# Patient Record
Sex: MALE | Race: WHITE | NOT HISPANIC OR LATINO | Employment: OTHER | URBAN - METROPOLITAN AREA
[De-identification: names, ages, dates, MRNs, and addresses within clinical notes are randomized per-mention and may not be internally consistent; named-entity substitution may affect disease eponyms.]

---

## 2018-06-03 ENCOUNTER — APPOINTMENT (OUTPATIENT)
Dept: RADIOLOGY | Facility: MEDICAL CENTER | Age: 63
End: 2018-06-03
Attending: EMERGENCY MEDICINE
Payer: COMMERCIAL

## 2018-06-03 ENCOUNTER — HOSPITAL ENCOUNTER (EMERGENCY)
Facility: MEDICAL CENTER | Age: 63
End: 2018-06-03
Attending: EMERGENCY MEDICINE
Payer: COMMERCIAL

## 2018-06-03 VITALS
TEMPERATURE: 97.5 F | HEIGHT: 66 IN | HEART RATE: 73 BPM | OXYGEN SATURATION: 93 % | RESPIRATION RATE: 17 BRPM | WEIGHT: 154 LBS | SYSTOLIC BLOOD PRESSURE: 122 MMHG | DIASTOLIC BLOOD PRESSURE: 76 MMHG | BODY MASS INDEX: 24.75 KG/M2

## 2018-06-03 DIAGNOSIS — M25.561 ACUTE PAIN OF RIGHT KNEE: ICD-10-CM

## 2018-06-03 PROCEDURE — A9270 NON-COVERED ITEM OR SERVICE: HCPCS | Performed by: EMERGENCY MEDICINE

## 2018-06-03 PROCEDURE — 99284 EMERGENCY DEPT VISIT MOD MDM: CPT

## 2018-06-03 PROCEDURE — 700102 HCHG RX REV CODE 250 W/ 637 OVERRIDE(OP): Performed by: EMERGENCY MEDICINE

## 2018-06-03 PROCEDURE — 73564 X-RAY EXAM KNEE 4 OR MORE: CPT | Mod: RT

## 2018-06-03 RX ORDER — IBUPROFEN 600 MG/1
600 TABLET ORAL ONCE
Status: COMPLETED | OUTPATIENT
Start: 2018-06-03 | End: 2018-06-03

## 2018-06-03 RX ADMIN — IBUPROFEN 600 MG: 600 TABLET, FILM COATED ORAL at 12:12

## 2018-06-03 ASSESSMENT — LIFESTYLE VARIABLES: DO YOU DRINK ALCOHOL: NO

## 2018-06-03 ASSESSMENT — PAIN SCALES - GENERAL: PAINLEVEL_OUTOF10: 4

## 2018-06-03 NOTE — ED NOTES
PT A&O X4, VS STABLE, RESPIRATIONS SYMMETRICA WITH NO S/S OF DISTRESS. PT VERBALIZES UNDERSTANDING OF DISCHARGE INSTRUCTIONS AND DENIES QUESTIONS. PT DISCHARGED TO HOME WITH CRUTCHES AND RIDE.

## 2018-06-03 NOTE — DISCHARGE INSTRUCTIONS
Joint Pain  Please see an orthopedist in follow-up in [place]. You likely have a meniscal tear  Joint pain, which is also called arthralgia, can be caused by many things. Joint pain often goes away when you follow your health care provider's instructions for relieving pain at home. However, joint pain can also be caused by conditions that require further treatment. Common causes of joint pain include:  · Bruising in the area of the joint.  · Overuse of the joint.  · Wear and tear on the joints that occur with aging (osteoarthritis).  · Various other forms of arthritis.  · A buildup of a crystal form of uric acid in the joint (gout).  · Infections of the joint (septic arthritis) or of the bone (osteomyelitis).  Your health care provider may recommend medicine to help with the pain. If your joint pain continues, additional tests may be needed to diagnose your condition.  Follow these instructions at home:  Watch your condition for any changes. Follow these instructions as directed to lessen the pain that you are feeling.  · Take medicines only as directed by your health care provider.  · Rest the affected area for as long as your health care provider says that you should. If directed to do so, raise the painful joint above the level of your heart while you are sitting or lying down.  · Do not do things that cause or worsen pain.  · If directed, apply ice to the painful area:  ¨ Put ice in a plastic bag.  ¨ Place a towel between your skin and the bag.  ¨ Leave the ice on for 20 minutes, 2-3 times per day.  · Wear an elastic bandage, splint, or sling as directed by your health care provider. Loosen the elastic bandage or splint if your fingers or toes become numb and tingle, or if they turn cold and blue.  · Begin exercising or stretching the affected area as directed by your health care provider. Ask your health care provider what types of exercise are safe for you.  · Keep all follow-up visits as directed by your  health care provider. This is important.  Contact a health care provider if:  · Your pain increases, and medicine does not help.  · Your joint pain does not improve within 3 days.  · You have increased bruising or swelling.  · You have a fever.  · You lose 10 lb (4.5 kg) or more without trying.  Get help right away if:  · You are not able to move the joint.  · Your fingers or toes become numb or they turn cold and blue.  This information is not intended to replace advice given to you by your health care provider. Make sure you discuss any questions you have with your health care provider.  Document Released: 12/18/2006 Document Revised: 05/19/2017 Document Reviewed: 09/29/2015  [employer] Interactive Patient Education © 2017 [employer]

## 2018-06-03 NOTE — ED PROVIDER NOTES
"ED Provider Note    CHIEF COMPLAINT  Chief Complaint   Patient presents with   • Knee Pain     right knee pain       HPI  Geoff Scott is a 63 y.o. male who presents stating that he had right knee pain developed acutely at 10:30 AM when he was walking in twisted his right knee while he had his prosthetic on. He has pain in the medial aspect of the knee joint as well as lateral aspect but denies any patellar discomfort. Denies any swelling or fever or warmth to the joint    ROS  Pertinent negative for fever    PAST MEDICAL HISTORY  Past Medical History:   Diagnosis Date   • Hx of right BKA (HCC)     since age of 10   • Stroke (HCC)     CVA in 2005, no remaining deficits       FAMILY HISTORY  No family history on file.    SOCIAL HISTORY   reports that he has been smoking.  He has never used smokeless tobacco. He reports that he uses drugs, including Inhaled. He reports that he does not drink alcohol.    SURGICAL HISTORY  No past surgical history on file.    CURRENT MEDICATIONS  Home Medications    **Home medications have not yet been reviewed for this encounter**         ALLERGIES  Not on File    PHYSICAL EXAM  VITAL SIGNS: /81   Pulse 80   Temp 36.8 °C (98.2 °F)   Resp 16   Ht 1.676 m (5' 6\")   Wt 69.9 kg (154 lb)   SpO2 98%   BMI 24.86 kg/m²    Constitutional: Well developed, Well nourished, No acute distress, Non-toxic appearance.   Skin: No evidence of erythema or cellulitis  Back:     Extremities: Right knee BKA, full range of motion of the joint. Tenderness at the medial knee joint, right side  Musculoskeletal:   Neurologic: Intact sensory except for medial aspect of the distal knee  Psychiatric:     RADIOLOGY/PROCEDURES  DX-KNEE COMPLETE 4+ RIGHT   Final Result      1.  No acute fracture is identified.      2.  Status post below-the-knee amputation.            COURSE & MEDICAL DECISION MAKING  Pertinent Labs & Imaging studies reviewed. (See chart for details)  Clinically the patient has no evidence " of fracture . He was really concerned about his bony structure and given his mechanism I did perform a 3 view right knee x-ray that shows no evidence of fracture and the joint line appears to be intact. The distal bone appears to be expected as a result of his amputation without evidence of significant erosive changes     The patient is discharged with instructions on right knee pain and will see an orthopedist in follow-up. He understands this is likely a meniscal tear and not a medial collateral strain but this is also a possibility. He can weight-bear as tolerated    FINAL IMPRESSION  1. Acute pain of right knee            Electronically signed by: Phoenix Pruitt, 6/3/2018 1:55 PM

## 2022-01-27 ENCOUNTER — APPOINTMENT (OUTPATIENT)
Dept: RADIOLOGY | Facility: MEDICAL CENTER | Age: 67
DRG: 023 | End: 2022-01-27
Attending: EMERGENCY MEDICINE
Payer: MEDICARE

## 2022-01-27 ENCOUNTER — APPOINTMENT (OUTPATIENT)
Dept: RADIOLOGY | Facility: MEDICAL CENTER | Age: 67
DRG: 023 | End: 2022-01-27
Attending: RADIOLOGY
Payer: MEDICARE

## 2022-01-27 ENCOUNTER — APPOINTMENT (OUTPATIENT)
Dept: RADIOLOGY | Facility: MEDICAL CENTER | Age: 67
DRG: 023 | End: 2022-01-27
Attending: INTERNAL MEDICINE
Payer: MEDICARE

## 2022-01-27 ENCOUNTER — HOSPITAL ENCOUNTER (INPATIENT)
Facility: MEDICAL CENTER | Age: 67
LOS: 14 days | DRG: 023 | End: 2022-02-10
Attending: EMERGENCY MEDICINE | Admitting: HOSPITALIST
Payer: MEDICARE

## 2022-01-27 DIAGNOSIS — I63.9 ACUTE CVA (CEREBROVASCULAR ACCIDENT) (HCC): ICD-10-CM

## 2022-01-27 DIAGNOSIS — I63.9 ACUTE ISCHEMIC STROKE (HCC): ICD-10-CM

## 2022-01-27 PROBLEM — U07.1 COVID-19: Status: ACTIVE | Noted: 2022-01-27

## 2022-01-27 LAB
ABO GROUP BLD: ABNORMAL
ALBUMIN SERPL BCP-MCNC: 3.3 G/DL (ref 3.2–4.9)
ALBUMIN/GLOB SERPL: 0.8 G/DL
ALP SERPL-CCNC: 99 U/L (ref 30–99)
ALT SERPL-CCNC: 45 U/L (ref 2–50)
ANION GAP SERPL CALC-SCNC: 13 MMOL/L (ref 7–16)
APTT PPP: 42.1 SEC (ref 24.7–36)
AST SERPL-CCNC: 29 U/L (ref 12–45)
BARCODED ABORH UBTYP: 5100
BARCODED PRD CODE UBPRD: ABNORMAL
BARCODED UNIT NUM UBUNT: ABNORMAL
BASOPHILS # BLD AUTO: 0.1 % (ref 0–1.8)
BASOPHILS # BLD: 0.01 K/UL (ref 0–0.12)
BILIRUB SERPL-MCNC: 0.7 MG/DL (ref 0.1–1.5)
BLD GP AB SCN SERPL QL: ABNORMAL
BUN SERPL-MCNC: 11 MG/DL (ref 8–22)
CALCIUM SERPL-MCNC: 8.8 MG/DL (ref 8.5–10.5)
CHLORIDE SERPL-SCNC: 102 MMOL/L (ref 96–112)
CO2 SERPL-SCNC: 23 MMOL/L (ref 20–33)
COMPONENT R 8504R: ABNORMAL
CREAT SERPL-MCNC: 0.78 MG/DL (ref 0.5–1.4)
EKG IMPRESSION: NORMAL
EOSINOPHIL # BLD AUTO: 0.02 K/UL (ref 0–0.51)
EOSINOPHIL NFR BLD: 0.3 % (ref 0–6.9)
ERYTHROCYTE [DISTWIDTH] IN BLOOD BY AUTOMATED COUNT: 45.6 FL (ref 35.9–50)
FLUAV RNA SPEC QL NAA+PROBE: NEGATIVE
FLUBV RNA SPEC QL NAA+PROBE: NEGATIVE
GLOBULIN SER CALC-MCNC: 4.2 G/DL (ref 1.9–3.5)
GLUCOSE BLD-MCNC: 104 MG/DL (ref 65–99)
GLUCOSE SERPL-MCNC: 115 MG/DL (ref 65–99)
HCT VFR BLD AUTO: 48.3 % (ref 42–52)
HGB BLD-MCNC: 16.4 G/DL (ref 14–18)
IMM GRANULOCYTES # BLD AUTO: 0.02 K/UL (ref 0–0.11)
IMM GRANULOCYTES NFR BLD AUTO: 0.3 % (ref 0–0.9)
INR PPP: 1.1 (ref 0.87–1.13)
LYMPHOCYTES # BLD AUTO: 1.58 K/UL (ref 1–4.8)
LYMPHOCYTES NFR BLD: 22.2 % (ref 22–41)
MCH RBC QN AUTO: 32 PG (ref 27–33)
MCHC RBC AUTO-ENTMCNC: 34 G/DL (ref 33.7–35.3)
MCV RBC AUTO: 94.3 FL (ref 81.4–97.8)
MONOCYTES # BLD AUTO: 0.69 K/UL (ref 0–0.85)
MONOCYTES NFR BLD AUTO: 9.7 % (ref 0–13.4)
NEUTROPHILS # BLD AUTO: 4.8 K/UL (ref 1.82–7.42)
NEUTROPHILS NFR BLD: 67.4 % (ref 44–72)
NRBC # BLD AUTO: 0 K/UL
NRBC BLD-RTO: 0 /100 WBC
PLATELET # BLD AUTO: 404 K/UL (ref 164–446)
PMV BLD AUTO: 8.9 FL (ref 9–12.9)
POTASSIUM SERPL-SCNC: 3.8 MMOL/L (ref 3.6–5.5)
PRODUCT TYPE UPROD: ABNORMAL
PROT SERPL-MCNC: 7.5 G/DL (ref 6–8.2)
PROTHROMBIN TIME: 13.9 SEC (ref 12–14.6)
RBC # BLD AUTO: 5.12 M/UL (ref 4.7–6.1)
RH BLD: ABNORMAL
RSV RNA SPEC QL NAA+PROBE: NEGATIVE
SARS-COV-2 RNA RESP QL NAA+PROBE: DETECTED
SODIUM SERPL-SCNC: 138 MMOL/L (ref 135–145)
SPECIMEN SOURCE: ABNORMAL
TROPONIN T SERPL-MCNC: 7 NG/L (ref 6–19)
UNIT STATUS USTAT: ABNORMAL
WBC # BLD AUTO: 7.1 K/UL (ref 4.8–10.8)

## 2022-01-27 PROCEDURE — 70496 CT ANGIOGRAPHY HEAD: CPT

## 2022-01-27 PROCEDURE — 700101 HCHG RX REV CODE 250: Performed by: RADIOLOGY

## 2022-01-27 PROCEDURE — 770022 HCHG ROOM/CARE - ICU (200)

## 2022-01-27 PROCEDURE — 700105 HCHG RX REV CODE 258: Performed by: INTERNAL MEDICINE

## 2022-01-27 PROCEDURE — 700117 HCHG RX CONTRAST REV CODE 255: Performed by: RADIOLOGY

## 2022-01-27 PROCEDURE — C1760 CLOSURE DEV, VASC: HCPCS

## 2022-01-27 PROCEDURE — 94760 N-INVAS EAR/PLS OXIMETRY 1: CPT

## 2022-01-27 PROCEDURE — 99291 CRITICAL CARE FIRST HOUR: CPT | Performed by: INTERNAL MEDICINE

## 2022-01-27 PROCEDURE — 70498 CT ANGIOGRAPHY NECK: CPT

## 2022-01-27 PROCEDURE — 99291 CRITICAL CARE FIRST HOUR: CPT | Performed by: PSYCHIATRY & NEUROLOGY

## 2022-01-27 PROCEDURE — 86922 COMPATIBILITY TEST ANTIGLOB: CPT

## 2022-01-27 PROCEDURE — 86900 BLOOD TYPING SEROLOGIC ABO: CPT

## 2022-01-27 PROCEDURE — 99291 CRITICAL CARE FIRST HOUR: CPT

## 2022-01-27 PROCEDURE — 86905 BLOOD TYPING RBC ANTIGENS: CPT

## 2022-01-27 PROCEDURE — 86870 RBC ANTIBODY IDENTIFICATION: CPT

## 2022-01-27 PROCEDURE — 03CG3ZZ EXTIRPATION OF MATTER FROM INTRACRANIAL ARTERY, PERCUTANEOUS APPROACH: ICD-10-PCS | Performed by: RADIOLOGY

## 2022-01-27 PROCEDURE — 85730 THROMBOPLASTIN TIME PARTIAL: CPT

## 2022-01-27 PROCEDURE — 93005 ELECTROCARDIOGRAM TRACING: CPT | Performed by: EMERGENCY MEDICINE

## 2022-01-27 PROCEDURE — 71045 X-RAY EXAM CHEST 1 VIEW: CPT

## 2022-01-27 PROCEDURE — 700105 HCHG RX REV CODE 258: Performed by: PSYCHIATRY & NEUROLOGY

## 2022-01-27 PROCEDURE — 93970 EXTREMITY STUDY: CPT

## 2022-01-27 PROCEDURE — 0042T CT-CEREBRAL PERFUSION ANALYSIS: CPT

## 2022-01-27 PROCEDURE — 70450 CT HEAD/BRAIN W/O DYE: CPT

## 2022-01-27 PROCEDURE — 82962 GLUCOSE BLOOD TEST: CPT

## 2022-01-27 PROCEDURE — 93010 ELECTROCARDIOGRAM REPORT: CPT | Performed by: INTERNAL MEDICINE

## 2022-01-27 PROCEDURE — 86901 BLOOD TYPING SEROLOGIC RH(D): CPT

## 2022-01-27 PROCEDURE — 85025 COMPLETE CBC W/AUTO DIFF WBC: CPT

## 2022-01-27 PROCEDURE — 86850 RBC ANTIBODY SCREEN: CPT

## 2022-01-27 PROCEDURE — 700117 HCHG RX CONTRAST REV CODE 255: Performed by: EMERGENCY MEDICINE

## 2022-01-27 PROCEDURE — 3E03317 INTRODUCTION OF OTHER THROMBOLYTIC INTO PERIPHERAL VEIN, PERCUTANEOUS APPROACH: ICD-10-PCS | Performed by: HOSPITALIST

## 2022-01-27 PROCEDURE — 700111 HCHG RX REV CODE 636 W/ 250 OVERRIDE (IP): Mod: JG | Performed by: PSYCHIATRY & NEUROLOGY

## 2022-01-27 PROCEDURE — 37195 THROMBOLYTIC THERAPY STROKE: CPT

## 2022-01-27 PROCEDURE — B3171ZZ FLUOROSCOPY OF LEFT INTERNAL CAROTID ARTERY USING LOW OSMOLAR CONTRAST: ICD-10-PCS | Performed by: RADIOLOGY

## 2022-01-27 PROCEDURE — 0241U HCHG SARS-COV-2 COVID-19 NFCT DS RESP RNA 4 TRGT MIC: CPT

## 2022-01-27 PROCEDURE — 85610 PROTHROMBIN TIME: CPT

## 2022-01-27 PROCEDURE — 80053 COMPREHEN METABOLIC PANEL: CPT

## 2022-01-27 PROCEDURE — 84484 ASSAY OF TROPONIN QUANT: CPT

## 2022-01-27 RX ORDER — ONDANSETRON 4 MG/1
4 TABLET, ORALLY DISINTEGRATING ORAL EVERY 4 HOURS PRN
Status: DISCONTINUED | OUTPATIENT
Start: 2022-01-27 | End: 2022-01-30

## 2022-01-27 RX ORDER — ACETAMINOPHEN 325 MG/1
650 TABLET ORAL EVERY 4 HOURS PRN
Status: ON HOLD | COMMUNITY
End: 2022-07-06

## 2022-01-27 RX ORDER — SODIUM CHLORIDE 9 MG/ML
INJECTION, SOLUTION INTRAVENOUS CONTINUOUS
Status: DISCONTINUED | OUTPATIENT
Start: 2022-01-27 | End: 2022-01-28

## 2022-01-27 RX ORDER — NOREPINEPHRINE BITARTRATE 0.03 MG/ML
0-30 INJECTION, SOLUTION INTRAVENOUS CONTINUOUS
Status: DISCONTINUED | OUTPATIENT
Start: 2022-01-27 | End: 2022-01-28

## 2022-01-27 RX ORDER — BISACODYL 10 MG
10 SUPPOSITORY, RECTAL RECTAL
Status: DISCONTINUED | OUTPATIENT
Start: 2022-01-27 | End: 2022-01-30

## 2022-01-27 RX ORDER — AMOXICILLIN 250 MG
2 CAPSULE ORAL 2 TIMES DAILY
Status: DISCONTINUED | OUTPATIENT
Start: 2022-01-27 | End: 2022-01-30

## 2022-01-27 RX ORDER — ONDANSETRON 2 MG/ML
4 INJECTION INTRAMUSCULAR; INTRAVENOUS EVERY 4 HOURS PRN
Status: DISCONTINUED | OUTPATIENT
Start: 2022-01-27 | End: 2022-02-10 | Stop reason: HOSPADM

## 2022-01-27 RX ORDER — LABETALOL HYDROCHLORIDE 5 MG/ML
10 INJECTION, SOLUTION INTRAVENOUS EVERY 4 HOURS PRN
Status: DISCONTINUED | OUTPATIENT
Start: 2022-01-27 | End: 2022-01-27

## 2022-01-27 RX ORDER — DEXTROSE MONOHYDRATE 25 G/50ML
50 INJECTION, SOLUTION INTRAVENOUS
Status: DISCONTINUED | OUTPATIENT
Start: 2022-01-27 | End: 2022-01-29

## 2022-01-27 RX ORDER — ACETAMINOPHEN 325 MG/1
650 TABLET ORAL EVERY 6 HOURS PRN
Status: DISCONTINUED | OUTPATIENT
Start: 2022-01-27 | End: 2022-01-30

## 2022-01-27 RX ORDER — HYDRALAZINE HYDROCHLORIDE 20 MG/ML
10 INJECTION INTRAMUSCULAR; INTRAVENOUS
Status: DISCONTINUED | OUTPATIENT
Start: 2022-01-27 | End: 2022-01-28

## 2022-01-27 RX ORDER — SODIUM CHLORIDE 9 MG/ML
50 INJECTION, SOLUTION INTRAVENOUS ONCE
Status: COMPLETED | OUTPATIENT
Start: 2022-01-27 | End: 2022-01-27

## 2022-01-27 RX ORDER — ATORVASTATIN CALCIUM 40 MG/1
80 TABLET, FILM COATED ORAL EVERY EVENING
Status: DISCONTINUED | OUTPATIENT
Start: 2022-01-27 | End: 2022-01-30

## 2022-01-27 RX ORDER — POLYETHYLENE GLYCOL 3350 17 G/17G
1 POWDER, FOR SOLUTION ORAL
Status: DISCONTINUED | OUTPATIENT
Start: 2022-01-27 | End: 2022-01-30

## 2022-01-27 RX ORDER — LABETALOL HYDROCHLORIDE 5 MG/ML
10 INJECTION, SOLUTION INTRAVENOUS
Status: DISCONTINUED | OUTPATIENT
Start: 2022-01-27 | End: 2022-01-28

## 2022-01-27 RX ADMIN — SODIUM CHLORIDE 50 ML: 9 INJECTION, SOLUTION INTRAVENOUS at 16:15

## 2022-01-27 RX ADMIN — SODIUM CHLORIDE: 9 INJECTION, SOLUTION INTRAVENOUS at 17:50

## 2022-01-27 RX ADMIN — IOHEXOL 40 ML: 350 INJECTION, SOLUTION INTRAVENOUS at 15:25

## 2022-01-27 RX ADMIN — IOHEXOL 55 ML: 300 INJECTION, SOLUTION INTRAVENOUS at 17:30

## 2022-01-27 RX ADMIN — LABETALOL HYDROCHLORIDE 10 MG: 5 INJECTION INTRAVENOUS at 20:40

## 2022-01-27 RX ADMIN — IOHEXOL 80 ML: 350 INJECTION, SOLUTION INTRAVENOUS at 15:27

## 2022-01-27 RX ADMIN — ALTEPLASE 64.8 MG: KIT at 15:32

## 2022-01-27 ASSESSMENT — FIBROSIS 4 INDEX: FIB4 SCORE: 0.7

## 2022-01-27 ASSESSMENT — ENCOUNTER SYMPTOMS
VOMITING: 0
MYALGIAS: 1
CHILLS: 0
COUGH: 1
FEVER: 0
SHORTNESS OF BREATH: 0
SPEECH CHANGE: 1
FOCAL WEAKNESS: 1
NAUSEA: 0
ABDOMINAL PAIN: 0

## 2022-01-27 ASSESSMENT — PAIN DESCRIPTION - PAIN TYPE
TYPE: ACUTE PAIN
TYPE: ACUTE PAIN

## 2022-01-27 NOTE — ED TRIAGE NOTES
Geoff Scott  66 y.o.  male  Chief Complaint   Patient presents with   • Possible Stroke     Onset at 1400, R sided gaze deviation & L sided facial droop, L sided arm & leg drift. BG 98 ons scene with REMSA. IV placed en route, no thinners.     Dr. Arechiga to charge desk & pt directly to CT then T-1.

## 2022-01-27 NOTE — ED NOTES
Pt to IT from trauma bay on [facility]. Report given to receiving RN. All belongings sent with pt & alteplase drip running.

## 2022-01-27 NOTE — ED PROVIDER NOTES
ED Provider Note    ED Provider Note    Primary care provider: Pcp Pt States None  Means of arrival: EMS  History obtained from: Patient    CHIEF COMPLAINT  Chief Complaint   Patient presents with   • Possible Stroke     Onset at 1400, R sided gaze deviation & L sided facial droop, L sided arm & leg drift. BG 98 ons scene with REMSA. IV placed en route, no thinners.     Seen at 3:12 PM.   HPI  Geoff Scott is a 66 y.o. male who presents to the Emergency Department as a stroke activation.  The patient was last known well at 2 PM today.  Then he had sudden onset of difficulty speaking, facial droop and left-sided weakness.  The history is obtained via his spouse as the patient cannot give any significant history at this time.    REVIEW OF SYSTEMS  See HPI,   Remainder of ROS unobtainable.     PAST MEDICAL HISTORY   has a past medical history of right BKA (HCC) and Stroke (HCC).    SURGICAL HISTORY  patient denies any surgical history    SOCIAL HISTORY  Social History     Tobacco Use   • Smoking status: Current Some Day Smoker   • Smokeless tobacco: Never Used   Substance Use Topics   • Alcohol use: No   • Drug use: Yes     Types: Inhaled     Comment: cannabis      Social History     Substance and Sexual Activity   Drug Use Yes   • Types: Inhaled    Comment: cannabis       FAMILY HISTORY  No family history on file.    CURRENT MEDICATIONS  Reviewed.  See Encounter Summary.     ALLERGIES  Not on File    PHYSICAL EXAM  VITAL SIGNS: /89   Pulse 80   Temp 36.7 °C (98 °F) (Temporal)   Resp (!) 23   Wt 80 kg (176 lb 5.9 oz)   SpO2 99%   BMI 28.47 kg/m²   Constitutional: Awake, appears mildly sedated, no acute distress   HENT: Normocephalic, atraumatic bilateral external ears normal. Nose normal.   Eyes: Right-sided gaze deviation with nystagmus, pupils are equal and reactive.  There appears to be some left-sided neglect.  Thorax & Lungs: Easy unlabored respirations, Clear to ascultation  bilaterally.  Cardiovascular: Regular rate, Regular rhythm, No murmurs, rubs or gallops. Bilateral pulses symmetrical.   Abdomen:  Soft, nontender, nondistended, normal active bowel sounds.   :    Skin: Visualized skin is  Dry, No erythema, No rash.   Musculoskeletal:   No cyanosis, clubbing or edema. No leg asymmetry.   Neurologic: Alert, no obvious facial droop, the patient has right-sided gaze, drift noted of the left upper and lower extremities, patient intermittently follows commands on the left, he does say a few words that are intelligible such as water but cannot communicate at this time.  Right upper and lower extremity have normal strength without drift.  NIH stroke scale approximately 18.  Psychiatric: Normal affect, Normal mood  Lymphatic:  No cervical LAD        RADIOLOGY  CT-CTA NECK WITH & W/O-POST PROCESSING   Final Result   Addendum 1 of 1   Addendum:   There is a small nonocclusive arterial thrombus within the right    innominate artery. This is seen on series 9 image 116.      These findings were discussed with REMY CARRERO by Dr. Rock on    1/27/2022.      Final      1.  No hemodynamically significant stenosis of the neck arteries.   2.  Bilateral ill-defined pulmonary opacities in keeping with Covid pneumonia.      CT-CTA HEAD WITH & W/O-POST PROCESS   Final Result      1.  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            Comment: Results discussed with Dr. Carrero at approximately 3:35 PM      CT-CEREBRAL PERFUSION ANALYSIS   Final Result      1.  Nondiagnostic scan secondary to patient motion.      CT-HEAD W/O   Final Result         NO ACUTE ABNORMALITIES ARE NOTED ON CT SCAN OF THE HEAD.               DX-CHEST-PORTABLE (1 VIEW)    (Results Pending)   IR-THROMBO MECHANICAL ARTERY,INIT    (Results Pending)   EC-ECHOCARDIOGRAM COMPLETE W/O CONT    (Results Pending)         COURSE & MEDICAL DECISION MAKING  Pertinent Labs & Imaging studies reviewed. (See  chart for details)    Differential diagnoses include but are not limited to: Acute CVA.    3:12 PM - Medical record reviewed, patient seen and examined at bedside.    3:51 PM: Case discussed with Dr. Salinas, radiology.  The patient does have an acute M2 and M3 occlusion.  I updated neurology, Dr. Arechiga.  Dr. Sanchez aware and the patient will be taken to the Cath Lab for possible thrombectomy.   I then called the daughter to update her with the test results, she is living in [facility].  The spouse is in the ER waiting room, I then had a discussion with her as well as to what the current plan is going to be.    4:15 PM Case discussed with Dr. Block, intensivist who will evaluate the patient for ICU admission.  Decision Making:  This is an unfortunate 66-year-old male who presents with a devastating CVA.  He did show up within the window for tenecteplase.  NIH stroke scale is approximately 16-18.  He did receive tenecteplase in the ER after noncontrast CT did not show an acute bleed.  CT with contrast does show an acute occlusion of M2 and M3 of the right middle cerebral artery.  Interventional radiology is aware and the patient went directly from the ER to the Cath Lab for thrombectomy.  Case discussed with the intensivist as well.    Incidentally, the patient did test positive for COVID-19.  This is a known pro-thrombotic state, so possibly related to his presentation today.  The patient will be admitted to the ICU in guarded condition.    CRITICAL CARE  The very real possibilty of a deterioration of this patient's condition required the highest level of my preparedness for sudden, emergent intervention.  I provided critical care services, which included medication orders, frequent reevaluations of the patient's condition and response to treatment, ordering and reviewing test results, and discussing the case with various consultants.  The critical care time associated with the care of the patient was  thirty-five minutes. Review chart for interventions. This time is exclusive of any other billable procedures.         FINAL IMPRESSION  1. Acute ischemic stroke (HCC)    2. Acute CVA (cerebrovascular accident) (HCC)    3.  Covid-19

## 2022-01-27 NOTE — PROGRESS NOTES
IR NOTE:     1542 - pt presents to IR for Cerebral Angiogram with possible mechanical thrombectomy w/moderate sedation and performed by Dr. Sanchez.  Pt presents to IR with ER staff, SEBAS Golden, per ER staff pt LKW 1400, wife with pt,  presented with right sided gaze, left facial droop and lue/lle weakness.  Per ED staff initial NIH 16 and repeat after tpa administration NIH 14.  Pt to IR with piv RFA with tpa infusing. Pt has + slurred speech. Pt alert and oriented, difficulty speaking.  Pt needs constant redirection to hold hands down.  Pt + movement of LUE.  Pt +BKA RLE.  Pt denies DM.      1605 Timeout, all team members agree.  Procedure begins.     1609 Dr. Sanchez gained access right femoral artery 8 F sheath     1614  First angio performed by Dr. Sanchez    1619 tpa completed, NS flush infusing.  First pass completed by Dr. Sanchez    1623 Tici 2a after first pass per Dr. Sanchez    1631 Dr. Sanchez performing 2nd pass.     1635 Tici 2b obtained Right M2    1640 Dr. Sanabria performing 3rd pass    1644 Tici 2b remains    1648 Angio Seal placed right groin by Dr. Sanchez  6F   Ref: [id number]  LOT: [id number]  EXP: 9/30/22    1650 procedure end, pressure held right groin by Russell ESTRADA x 5 min    1656 Gauze and tegaderm applied right groin.     1700 Pt able to move bilat upper and lower extremities, right sided gaze, able to move past midline to look at left side.  GCS 15, pt speech clearer and pt laughing.

## 2022-01-27 NOTE — ED NOTES
R AC IV placed by REMSA interstitial after CT contrast given. Heat pack applied & IV access obtained in R hand for TPA administration.

## 2022-01-27 NOTE — CONSULTS
Neurology STROKE CODE H&P  Neurohospitalist Service, [facility]    Referring Physician: Morgan Beyer MD    STROKE CODE: L side weakness, slurred speech    To obtain the most accurate data regarding the time called, and time patient seen, refer to the stroke run-sheet and chart.  For time of CT, refer to the radiology report. See A&P below for TPA Decision and door to needle time if and when applicable.    HPI: Geoff Scott is a 66 year old man with no known medical history presenting with L side weakness and slurred speech.  Per EMS, he is not on home medications.  At 1400 today, family witnessed him to acutely developed slurred speech, L side weakness.  He apparently stumbled and fell, but did not strike his head.  EMS was activated and noted similar symptoms. Initial SBP in 130s, HR 78, FSBS 98.  He was transferred by life flight to [facility].  On arrival, NIHSS was 16 as documented below, consistent with R MCA syndrome.  Stroke protocol CT did not revealed a large territory stroke or hemorrhage.  CT angiogram with R M2 occlusion.  CT perfusion was non-diagnostics.  He received IV-tPA and was taken to cath lab for endovascular clot retrieval.  Unable to attain ROS, per EMS report does smoke marijunana.    Review of systems: In addition to what is detailed in the HPI above, all other systems reviewed and are negative.    Past Medical History:    has a past medical history of right BKA (HCC) and Stroke ([facility]).    FHx:  Unable to attain due to his stroke symptoms    SHx:   reports that he has been smoking. He has never used smokeless tobacco. He reports current drug use. Drug: Inhaled. He reports that he does not drink alcohol.    Allergies:  No known drug allergy    Medications:    Current Facility-Administered Medications:   •  alteplase (ACTIVASE) BOLUS injection 7.2 mg, 0.09 mg/kg, Intravenous, Once **FOLLOWED BY** alteplase (ACTIVASE) injection 64.8 mg, 0.81 mg/kg, Intravenous, Once  **FOLLOWED BY** NS infusion 50 mL, 50 mL, Intravenous, Once, Alec Arechiga M.D.  No current outpatient medications on file.    Physical Examination:    Vitals:    01/27/22 1500   Weight: 80 kg (176 lb 5.9 oz)         General: Patient is awake and in no acute distress  Eye: Examination of optic disks not indicated at this time given acuity of consult  Neck: There is normal range of motion  CV: Regular rate   Extremities:  Clear, dry, intact, without peripheral edema    NEUROLOGICAL EXAM:     Mental status: Awake, alert, oriented  Speech and language: Speech paucity.  Severe dysarthria.  He follows all commands briskly  Cranial nerve exam: L face droop, R gaze deviation.  Does not blink to threat from L.    Motor exam: There is sustained antigravity with no downward drift in R arm and leg.  L arm and leg are antigravity with drift.  Sensory exam:  Reacts to tactile in all 4 distal extremities, does appear to neglect L side  Coordination: No ataxia on spontaneous movements  Gait: Deferred due to patient preference.    NIHSS: National Institutes of Health Stroke Scale    [0] 1a:Level of Consciousness    0-alert 1-drowsy   2-stupor   3-coma  [2] 1b:LOC Questions                  0-both  1-one      2-neither  [0] 1c:LOC Commands                   0-both  1-one      2-neither  [2] 2: Best Gaze                     0-nl    1-partial  2-forced  [2] 3: Visual Fields                   0-nl    1-partial  2-complete 3-bilat  [2] 4: Facial Paresis                0-nl    1-minor    2-partial  3-full  MOTOR                       0-nl  [0] 5: Right Arm           1-drift  [1] 6: Left Arm             2-some effort vs gravity  [0] 7: Right Leg           3-no effort vs gravity  [1] 8: Left Leg             4-no movement                             x-untestable  [0] 9: Limb Ataxia                    0-abs   1-1_limb   2-2+_limbs       x-untestable  [1] 10:Sensory                        0-nl    1-partial  2-dense  [2] 11:Best  Language/Aphasia         0-nl    1-mild/mod 2-severe   3-mute  [1] 12:Dysarthria                     0-nl    1-mild/mod 2-severe       x-untestable  [2] 13:Neglect/Inattention            0-none  1-partial  2-complete  [16] TOTAL    Baseline Modified Kati Scale (MRS): 1 = No significant disability, despite symptoms; able to perform all usual duties and activities    Objective Data:    Labs:  No results found for: PROTHROMBTM, INR   No results found for: WBC, RBC, HEMOGLOBIN, HEMATOCRIT, MCV, MCH, MCHC, MPV, NEUTSPOLYS, LYMPHOCYTES, MONOCYTES, EOSINOPHILS, BASOPHILS, HYPOCHROMIA, ANISOCYTOSIS   No results found for: SODIUM, POTASSIUM, CHLORIDE, CO2, GLUCOSE, BUN, CREATININE, BUNCREATRAT, GLOMRATE   No results found for: CHOLSTRLTOT, LDL, HDL, TRIGLYCERIDE    No results found for: ALKPHOSPHAT, ASTSGOT, ALTSGPT, TBILIRUBIN     Imaging/Testing:    I interpreted and/or reviewed the patient's neuroimaging    CT-CTA NECK WITH & W/O-POST PROCESSING   Final Result   Addendum 1 of 1   Addendum:   There is a small nonocclusive arterial thrombus within the right    innominate artery. This is seen on series 9 image 116.      These findings were discussed with REMY CARRERO by Dr. Rock on    1/27/2022.      Final      1.  No hemodynamically significant stenosis of the neck arteries.   2.  Bilateral ill-defined pulmonary opacities in keeping with Covid pneumonia.      CT-CTA HEAD WITH & W/O-POST PROCESS   Final Result      1.  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            Comment: Results discussed with Dr. Carrero at approximately 3:35 PM      CT-CEREBRAL PERFUSION ANALYSIS   Final Result      1.  Nondiagnostic scan secondary to patient motion.      CT-HEAD W/O   Final Result         NO ACUTE ABNORMALITIES ARE NOTED ON CT SCAN OF THE HEAD.               DX-CHEST-PORTABLE (1 VIEW)    (Results Pending)   IR-THROMBO MECHANICAL ARTERY,INIT    (Results Pending)       Assessment and  Plan:  Geoff Scott is a 66 year old man presenting with R MCA syndrome, found to have a R M2 occlusion.  He has received IV-tPA and will be taken to cath lab for endovascular clot retrieval.  Unclear stroke etiology- he does have findings suspicious for COVID pneumonia- and stroke may be secondary to COVID associated hypercoagulability.  Will monitor post-operatively in ICU, assess vascular risk factors and initiate secondary prevention regimen as noted below.    I recommend administering IV tPA per standard protocol.      I reviewed the risks (including possible bleeding complications and death), benefits, and alternatives with the patient and/or surrogate decision maker who wishes to proceed with the medication.    Last known well time: 1400  IV-tPA bolus dose: 7.2mg  IV-tPA bolus time: 1530    Problem list:  1.  R MCA stroke s/p IV-tPA and mechanical thrombectomy  2.  Suspect COVID pneumonia    Recommendations:   - admit to ICU post-operatively   - neurochecks/NIHSS per post-tPA protocol   - BP goal post-operatively TBD based on TICI score as follows:    If TICI 3: maintain systolic -140    If TICI 2b: maintain systolic 120-160    If TICI 2a or less, maintain systolic -180    - MRI brain without contrast in AM, does not need to wait 24 hours   - no need for repeat head CT unless there is clinical deterioration   - no antithrombotic therapy x 24 hours, SCDs only   - check COVID   - anticipate starting anticoagulation with apixaban for presumed COVID-related hypercoagulability, TBD pending MRI results   - TTE, long-term cardiac monitor for occult atrial fibrillation- if unmasked will need lifelong anticoagulation   - stroke labs:  HgbA1c and lipid panel   - start atorvastatin 80mg daily for goal LDL < 70 when able   - PT/OT/SLP when able, ok if within first 24 hours    Addendum:  TICI 2b, SBP goal 120-160 STRICT.  Plan as above.    Upon my evaluation, this patient demonstrated a high probability  imminent or life threatening deterioration due to cerebrovascular accident which required my direct attention, intervention and management.  I personally provided 65 minutes of total critical care time which included assessment of the patient, determining eligibility for TPA or mechanical thrombectomy, review of imaging studies and discussion with Radiology, discussion with E.R. Physician, ICU staff, patient as well as family members and monitoring for potential decompensation. Interventions were performed as documented above.       Alec Arechiga MD  Neurohospitalist, [facility]

## 2022-01-28 ENCOUNTER — APPOINTMENT (OUTPATIENT)
Dept: RADIOLOGY | Facility: MEDICAL CENTER | Age: 67
DRG: 023 | End: 2022-01-28
Attending: INTERNAL MEDICINE
Payer: MEDICARE

## 2022-01-28 ENCOUNTER — APPOINTMENT (OUTPATIENT)
Dept: RADIOLOGY | Facility: MEDICAL CENTER | Age: 67
DRG: 023 | End: 2022-01-28
Attending: NURSE PRACTITIONER
Payer: MEDICARE

## 2022-01-28 ENCOUNTER — APPOINTMENT (OUTPATIENT)
Dept: RADIOLOGY | Facility: MEDICAL CENTER | Age: 67
DRG: 023 | End: 2022-01-28
Attending: RADIOLOGY
Payer: MEDICARE

## 2022-01-28 PROBLEM — E11.49 TYPE 2 DIABETES MELLITUS WITH NEUROLOGIC COMPLICATION, WITHOUT LONG-TERM CURRENT USE OF INSULIN (HCC): Status: ACTIVE | Noted: 2022-01-28

## 2022-01-28 LAB
ABO + RH BLD: NORMAL
ALBUMIN SERPL BCP-MCNC: 3.3 G/DL (ref 3.2–4.9)
ALBUMIN/GLOB SERPL: 1 G/DL
ALP SERPL-CCNC: 89 U/L (ref 30–99)
ALT SERPL-CCNC: 31 U/L (ref 2–50)
ANION GAP SERPL CALC-SCNC: 11 MMOL/L (ref 7–16)
AST SERPL-CCNC: 23 U/L (ref 12–45)
BILIRUB SERPL-MCNC: 0.7 MG/DL (ref 0.1–1.5)
BLD GP AB INVEST PLASRBC-IMP: ABNORMAL
BUN SERPL-MCNC: 11 MG/DL (ref 8–22)
CALCIUM SERPL-MCNC: 8.3 MG/DL (ref 8.5–10.5)
CHLORIDE SERPL-SCNC: 106 MMOL/L (ref 96–112)
CHOLEST SERPL-MCNC: 183 MG/DL (ref 100–199)
CO2 SERPL-SCNC: 22 MMOL/L (ref 20–33)
CREAT SERPL-MCNC: 0.74 MG/DL (ref 0.5–1.4)
ERYTHROCYTE [DISTWIDTH] IN BLOOD BY AUTOMATED COUNT: 46.1 FL (ref 35.9–50)
EST. AVERAGE GLUCOSE BLD GHB EST-MCNC: 143 MG/DL
GLOBULIN SER CALC-MCNC: 3.2 G/DL (ref 1.9–3.5)
GLUCOSE BLD-MCNC: 102 MG/DL (ref 65–99)
GLUCOSE BLD-MCNC: 103 MG/DL (ref 65–99)
GLUCOSE BLD-MCNC: 114 MG/DL (ref 65–99)
GLUCOSE BLD-MCNC: 84 MG/DL (ref 65–99)
GLUCOSE BLD-MCNC: 96 MG/DL (ref 65–99)
GLUCOSE SERPL-MCNC: 106 MG/DL (ref 65–99)
HBA1C MFR BLD: 6.6 % (ref 4–5.6)
HCT VFR BLD AUTO: 43.7 % (ref 42–52)
HDLC SERPL-MCNC: 23 MG/DL
HGB BLD-MCNC: 15.1 G/DL (ref 14–18)
LDLC SERPL CALC-MCNC: 134 MG/DL
MAGNESIUM SERPL-MCNC: 2.1 MG/DL (ref 1.5–2.5)
MCH RBC QN AUTO: 32.7 PG (ref 27–33)
MCHC RBC AUTO-ENTMCNC: 34.6 G/DL (ref 33.7–35.3)
MCV RBC AUTO: 94.6 FL (ref 81.4–97.8)
PHOSPHATE SERPL-MCNC: 3 MG/DL (ref 2.5–4.5)
PLATELET # BLD AUTO: 388 K/UL (ref 164–446)
PMV BLD AUTO: 9 FL (ref 9–12.9)
POTASSIUM SERPL-SCNC: 3.9 MMOL/L (ref 3.6–5.5)
PROT SERPL-MCNC: 6.5 G/DL (ref 6–8.2)
RBC # BLD AUTO: 4.62 M/UL (ref 4.7–6.1)
SODIUM SERPL-SCNC: 139 MMOL/L (ref 135–145)
TRIGL SERPL-MCNC: 128 MG/DL (ref 0–149)
WBC # BLD AUTO: 8.3 K/UL (ref 4.8–10.8)

## 2022-01-28 PROCEDURE — 700102 HCHG RX REV CODE 250 W/ 637 OVERRIDE(OP): Performed by: INTERNAL MEDICINE

## 2022-01-28 PROCEDURE — 99233 SBSQ HOSP IP/OBS HIGH 50: CPT | Performed by: PSYCHIATRY & NEUROLOGY

## 2022-01-28 PROCEDURE — 70450 CT HEAD/BRAIN W/O DYE: CPT

## 2022-01-28 PROCEDURE — 770022 HCHG ROOM/CARE - ICU (200)

## 2022-01-28 PROCEDURE — 99291 CRITICAL CARE FIRST HOUR: CPT | Mod: FS | Performed by: NURSE PRACTITIONER

## 2022-01-28 PROCEDURE — 83036 HEMOGLOBIN GLYCOSYLATED A1C: CPT

## 2022-01-28 PROCEDURE — 80053 COMPREHEN METABOLIC PANEL: CPT

## 2022-01-28 PROCEDURE — A9270 NON-COVERED ITEM OR SERVICE: HCPCS | Performed by: INTERNAL MEDICINE

## 2022-01-28 PROCEDURE — 80061 LIPID PANEL: CPT

## 2022-01-28 PROCEDURE — 83735 ASSAY OF MAGNESIUM: CPT

## 2022-01-28 PROCEDURE — 82962 GLUCOSE BLOOD TEST: CPT

## 2022-01-28 PROCEDURE — 92610 EVALUATE SWALLOWING FUNCTION: CPT

## 2022-01-28 PROCEDURE — 84100 ASSAY OF PHOSPHORUS: CPT

## 2022-01-28 PROCEDURE — 85027 COMPLETE CBC AUTOMATED: CPT

## 2022-01-28 RX ORDER — LABETALOL HYDROCHLORIDE 5 MG/ML
10 INJECTION, SOLUTION INTRAVENOUS
Status: DISCONTINUED | OUTPATIENT
Start: 2022-01-28 | End: 2022-02-10 | Stop reason: HOSPADM

## 2022-01-28 RX ORDER — HYDRALAZINE HYDROCHLORIDE 20 MG/ML
10-20 INJECTION INTRAMUSCULAR; INTRAVENOUS
Status: DISCONTINUED | OUTPATIENT
Start: 2022-01-28 | End: 2022-02-10 | Stop reason: HOSPADM

## 2022-01-28 RX ORDER — POTASSIUM CHLORIDE 20 MEQ/1
40 TABLET, EXTENDED RELEASE ORAL ONCE
Status: COMPLETED | OUTPATIENT
Start: 2022-01-28 | End: 2022-01-28

## 2022-01-28 RX ADMIN — POTASSIUM CHLORIDE 40 MEQ: 1500 TABLET, EXTENDED RELEASE ORAL at 10:48

## 2022-01-28 ASSESSMENT — ENCOUNTER SYMPTOMS
FOCAL WEAKNESS: 0
CHILLS: 0
DEPRESSION: 0
SENSORY CHANGE: 0
HEMOPTYSIS: 0
DIZZINESS: 0
ABDOMINAL PAIN: 0
SHORTNESS OF BREATH: 0
SPEECH CHANGE: 0
COUGH: 0
FOCAL WEAKNESS: 1
COUGH: 1
PALPITATIONS: 0
BLURRED VISION: 0
DOUBLE VISION: 0
WEAKNESS: 0
HEADACHES: 0
SORE THROAT: 0
MYALGIAS: 0
BRUISES/BLEEDS EASILY: 0
VOMITING: 0
NECK PAIN: 0
FEVER: 0

## 2022-01-28 ASSESSMENT — PATIENT HEALTH QUESTIONNAIRE - PHQ9
SUM OF ALL RESPONSES TO PHQ9 QUESTIONS 1 AND 2: 0
1. LITTLE INTEREST OR PLEASURE IN DOING THINGS: NOT AT ALL
2. FEELING DOWN, DEPRESSED, IRRITABLE, OR HOPELESS: NOT AT ALL

## 2022-01-28 ASSESSMENT — FIBROSIS 4 INDEX: FIB4 SCORE: 0.69

## 2022-01-28 NOTE — ASSESSMENT & PLAN NOTE
Contributing to hypercoagulability and CVA   Requiring 2 L NC overnight  Procal negative  CXR appears stable   DVT study negative   Continue enhanced droplet isolation

## 2022-01-28 NOTE — PROGRESS NOTES
Patient with increased aphasia and right sided weakness.     Stat non-con head CT showing evolving stroke right temporal occipital region with hemorrhagic conversion.     Discussed with Neurology team - new SBP goal 100-140    LAUREANO Rossi.  ]

## 2022-01-28 NOTE — OR SURGEON
Immediate Post- Operative Note        Findings: Left M2 occlusions      Procedure(s):Mechanical thrombectomies x3    TICI 2b      Estimated Blood Loss: Less than 5 ml        Complications: None            1/27/2022     4:56 PM     Genaro Sanchez M.D.

## 2022-01-28 NOTE — THERAPY
"Speech Language Pathology   Clinical Swallow Evaluation     Patient Name: Geoff Scott  AGE:  66 y.o., SEX:  male  Medical Record #: [id number]  Today's Date: 1/28/2022     Precautions  Precautions: Fall Risk,Swallow Precautions ( See Comments)    Assessment  Patient is 66 y.o. male admitted 1/27 with L sided weakness and slurred speech. S/p tPA and thrombectomy x3. Found to also have COVID pneumonia. PMHx of right BKA and stroke. Head CT: negative. CXR: \"Patchy airspace process consistent with edema or atelectasis.\" No previous SLP notes in EMR.     Patient seen this date for clinical swallow evaluation. Patient awake, alert, and pleasant during evaluation. Patient with clear vocal quality and no gross deficits noted during oral motor evaluation. Patient's dentition was intact. Patient complete informal tactile sensory testing to face and results were WNL. Patient consumed PO trials of single ice chips, purees, pudding, soft solids, mixed consistencies, crackers, and thin liquids via cup sip and straw. Patient consumed all PO trials with no overt s/sx of aspiration. No oral residue noted post-swallow and mastication appeared adequate. Patient did require some assistance w/ feeding d/t suspected depth perception difficulties, as he often poked his face with the straw before realizing exactly where it was in relation to him. Laryngeal elevation palpated as complete. Initiation of swallow trigger was timely.     Recommend patient start regular diet w/ thin liquids w/ assistance as needed. Straw sips ok. Ok for meds whole w/ thin liquid wash as tolerated. SLP to follow.     Plan  Recommend Speech Therapy 3 times per week until therapy goals are met for the following treatments:  Dysphagia Training and Patient / Family / Caregiver Education.    Discharge Recommendations: Anticipate that the patient will have no further speech therapy needs after discharge from the hospital     Objective     01/28/22 0950   Precautions "   Precautions Fall Risk;Swallow Precautions ( See Comments)   Vitals   O2 (LPM) 1   O2 Delivery Device Nasal Cannula   Oral Motor Eval    Is Patient Able to Complete Oral Motor Eval Yes, Within Normal Limits   Laryngeal Function   Voice Quality Within Functional Limits   Volutional Cough Within Functional Limits   Excursion Upon Swallow Complete   Max Phonation Time (Seconds) 8   Oral Food Presentation   Ice Chips Within Functional Limits   Single Swallow Thin (0) Within Functional Limits   Serial Swallow Thin (0) Within Functional Limits   Liquidised (3) Within Functional Limits   Pureed (4) Within Functional Limits   Soft & Bite-Sized (6) - (Dysphagia III) Within Functional Limits   Regular (7) Within Functional Limits   Regular-Easy to Chew (7) Within Functional Limits   Self Feeding Independent   Dysphagia Strategies / Recommendations   Strategies / Interventions Recommended (Yes / No) Yes   Compensatory Strategies Monitor During Meals;Head of Bed 90 Degrees During Eating / Drinking;Single Sips / Bites   Diet / Liquid Recommendation Regular (7);Thin (0)   Medication Administration  Whole with Liquid Wash   Therapy Interventions Dysphagia Therapy By Speech Language Pathologist   Short Term Goals   Short Term Goal # 1 Patient will consume regular diet w/ thin liquids w/ assistance as needed w/ no overt s/sx of aspiration.    Anticipated Discharge Needs   Discharge Recommendations Anticipate that the patient will have no further speech therapy needs after discharge from the hospital

## 2022-01-28 NOTE — CONSULTS
Physical Medicine and Rehabilitation Consultation              Date of initial consultation: 1/28/2022  Requested by: Alec Block MD  Consulting physician: Jeancarlos Mcfarlane D.O.  Reason for consultation: assessment of rehabilitation needs  LOS: 1 Day(s)    Chief complaint: difficulty speaking, facial droop and left-sided weakness     This history was prepared after reviewing the patient's chart at [facility].    HPI: The patient is a 66 y.o. 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 The patient was last known well was an hour prior to presentation. +COVID19. He received IV-tPA and was taken to cath lab for endovascular clot retrieval.      The patient was found to have the following:  There is a small nonocclusive arterial thrombus within the right    innominate artery   Bilateral ill-defined pulmonary opacities in keeping with Covid pneumonia.  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    The patient underwent the following procedures:   Left M2 Mechanical thrombectomies x3 by Dr. Genaro Sanchez MD, on 1/27/2022    Physiatry was consulted to assess the patient's rehabilitation needs.    Goals for rehabilitation include: improving independence and going home safely    Current function during therapy include:  Restrictions: none  PT: Functional mobility   pending    OT: Activities of daily living  pending    SLP: Cognition/swallow/speech  Patient seen this date for clinical swallow evaluation. Patient awake, alert, and pleasant during evaluation. Patient with clear vocal quality and no gross deficits noted during oral motor evaluation. Patient's dentition was intact. Patient complete informal tactile sensory testing to face and results  were WNL. Patient consumed PO trials of single ice chips, purees, pudding, soft solids, mixed consistencies, crackers, and thin liquids via cup sip and straw. Patient consumed all PO trials with no overt s/sx of aspiration. No oral residue noted post-swallow and mastication appeared adequate. Patient did require some assistance w/ feeding d/t suspected depth perception difficulties, as he often poked his face with the straw before realizing exactly where it was in relation to him. Laryngeal elevation palpated as complete. Initiation of swallow trigger was timely.      Recommend patient start regular diet w/ thin liquids w/ assistance as needed. Straw sips ok. Ok for meds whole w/ thin liquid wash as tolerated. SLP to follow.       PMH:  Past Medical History:   Diagnosis Date   • Hx of right BKA (HCC)     since age of 10   • Stroke (HCC)     CVA in 2005, no remaining deficits       PSH:  No past surgical history on file.    FHX:  No family history on file.    Medications:  Current Facility-Administered Medications   Medication Dose   • hydrALAZINE (APRESOLINE) injection 10-20 mg  10-20 mg   • labetalol (NORMODYNE/TRANDATE) injection 10 mg  10 mg   • senna-docusate (PERICOLACE or SENOKOT S) 8.6-50 MG per tablet 2 Tablet  2 Tablet    And   • polyethylene glycol/lytes (MIRALAX) PACKET 1 Packet  1 Packet    And   • magnesium hydroxide (MILK OF MAGNESIA) suspension 30 mL  30 mL    And   • bisacodyl (DULCOLAX) suppository 10 mg  10 mg   • acetaminophen (Tylenol) tablet 650 mg  650 mg   • ondansetron (ZOFRAN) syringe/vial injection 4 mg  4 mg   • ondansetron (ZOFRAN ODT) dispertab 4 mg  4 mg   • insulin regular (HumuLIN R,NovoLIN R) injection  1-6 Units    And   • dextrose 50% (D50W) injection 50 mL  50 mL   • atorvastatin (LIPITOR) tablet 80 mg  80 mg       Allergies:  Allergies   Allergen Reactions   • Banana Hives and Itching   • Grape, Artificial Hives and Itching     ALLERGIC TO GRAPES.     Physical Exam:  Vitals: BP  143/80   Pulse 77   Temp 36.2 °C (97.2 °F) (Temporal)   Resp 17   Wt 67.2 kg (148 lb 2.4 oz)   SpO2 94%     Labs: Reviewed and significant for   Recent Labs     01/27/22  1508 01/28/22  0450   RBC 5.12 4.62*   HEMOGLOBIN 16.4 15.1   HEMATOCRIT 48.3 43.7   PLATELETCT 404 388   PROTHROMBTM 13.9  --    APTT 42.1*  --    INR 1.10  --      Recent Labs     01/27/22  1508 01/28/22  0450   SODIUM 138 139   POTASSIUM 3.8 3.9   CHLORIDE 102 106   CO2 23 22   GLUCOSE 115* 106*   BUN 11 11   CREATININE 0.78 0.74   CALCIUM 8.8 8.3*     Recent Results (from the past 24 hour(s))   CBC WITH DIFFERENTIAL    Collection Time: 01/27/22  3:08 PM   Result Value Ref Range    WBC 7.1 4.8 - 10.8 K/uL    RBC 5.12 4.70 - 6.10 M/uL    Hemoglobin 16.4 14.0 - 18.0 g/dL    Hematocrit 48.3 42.0 - 52.0 %    MCV 94.3 81.4 - 97.8 fL    MCH 32.0 27.0 - 33.0 pg    MCHC 34.0 33.7 - 35.3 g/dL    RDW 45.6 35.9 - 50.0 fL    Platelet Count 404 164 - 446 K/uL    MPV 8.9 (L) 9.0 - 12.9 fL    Neutrophils-Polys 67.40 44.00 - 72.00 %    Lymphocytes 22.20 22.00 - 41.00 %    Monocytes 9.70 0.00 - 13.40 %    Eosinophils 0.30 0.00 - 6.90 %    Basophils 0.10 0.00 - 1.80 %    Immature Granulocytes 0.30 0.00 - 0.90 %    Nucleated RBC 0.00 /100 WBC    Neutrophils (Absolute) 4.80 1.82 - 7.42 K/uL    Lymphs (Absolute) 1.58 1.00 - 4.80 K/uL    Monos (Absolute) 0.69 0.00 - 0.85 K/uL    Eos (Absolute) 0.02 0.00 - 0.51 K/uL    Baso (Absolute) 0.01 0.00 - 0.12 K/uL    Immature Granulocytes (abs) 0.02 0.00 - 0.11 K/uL    NRBC (Absolute) 0.00 K/uL   COMP METABOLIC PANEL    Collection Time: 01/27/22  3:08 PM   Result Value Ref Range    Sodium 138 135 - 145 mmol/L    Potassium 3.8 3.6 - 5.5 mmol/L    Chloride 102 96 - 112 mmol/L    Co2 23 20 - 33 mmol/L    Anion Gap 13.0 7.0 - 16.0    Glucose 115 (H) 65 - 99 mg/dL    Bun 11 8 - 22 mg/dL    Creatinine 0.78 0.50 - 1.40 mg/dL    Calcium 8.8 8.5 - 10.5 mg/dL    AST(SGOT) 29 12 - 45 U/L    ALT(SGPT) 45 2 - 50 U/L    Alkaline  Phosphatase 99 30 - 99 U/L    Total Bilirubin 0.7 0.1 - 1.5 mg/dL    Albumin 3.3 3.2 - 4.9 g/dL    Total Protein 7.5 6.0 - 8.2 g/dL    Globulin 4.2 (H) 1.9 - 3.5 g/dL    A-G Ratio 0.8 g/dL   PROTHROMBIN TIME    Collection Time: 01/27/22  3:08 PM   Result Value Ref Range    PT 13.9 12.0 - 14.6 sec    INR 1.10 0.87 - 1.13   APTT    Collection Time: 01/27/22  3:08 PM   Result Value Ref Range    APTT 42.1 (H) 24.7 - 36.0 sec   COD (ADULT)    Collection Time: 01/27/22  3:08 PM   Result Value Ref Range    ABO Grouping Only B     Rh Grouping Only POS     Antibody Screen-Cod POS (A)     Component R       R99                 Red Cells, LR       [id number]   selected     01/28/22   10:37      Product Type R99     Dispense Status selected     Unit Number (Barcoded) [id number]     Product Code (Barcoded) V6183O72     Blood Type (Barcoded) 5100    TROPONIN    Collection Time: 01/27/22  3:08 PM   Result Value Ref Range    Troponin T 7 6 - 19 ng/L   ESTIMATED GFR    Collection Time: 01/27/22  3:08 PM   Result Value Ref Range    GFR If African American >60 >60 mL/min/1.73 m 2    GFR If Non African American >60 >60 mL/min/1.73 m 2   ANTIBODY IDENTIFICATION    Collection Time: 01/27/22  3:08 PM   Result Value Ref Range    Antibody Id POS, anti-E (A)    COV-2, FLU A/B, AND RSV BY PCR (2-4 HOURS CEPHEID): Collect NP swab in VTM    Collection Time: 01/27/22  5:45 PM    Specimen: Nasopharyngeal; Respirate   Result Value Ref Range    Influenza virus A RNA Negative Negative    Influenza virus B, PCR Negative Negative    RSV, PCR Negative Negative    SARS-CoV-2 by PCR DETECTED (AA)     SARS-CoV-2 Source NP Swab    POCT glucose device results    Collection Time: 01/27/22  5:47 PM   Result Value Ref Range    Glucose - Accu-Ck 104 (H) 65 - 99 mg/dL   EKG (NOW)    Collection Time: 01/27/22  6:47 PM   Result Value Ref Range    Report       [facility]    Test Date:  2022-01-27  Pt Name:    BRIANNE CREWS                Department:  ER  MRN:        [id number]                      Room:       [facility]  Gender:     Male                         Technician: MARY ELLEN  :        1955                   Requested By:REMY CARRERO  Order #:    [id number]                    Reading MD: Wilber Hernandez MD    Measurements  Intervals                                Axis  Rate:       74                           P:          55  CO:         152                          QRS:        14  QRSD:       70                           T:          33  QT:         412  QTc:        457    Interpretive Statements  SINUS RHYTHM  LOW VOLTAGE IN FRONTAL LEADS  No previous ECG available for comparison  Electronically Signed On 2022 22:11:09 PST by Wilber Hernandez MD     POCT glucose device results    Collection Time: 22 12:31 AM   Result Value Ref Range    Glucose - Accu-Ck 102 (H) 65 - 99 mg/dL   ABO Rh Confirm    Collection Time: 22  4:50 AM   Result Value Ref Range    ABO Rh Confirm B POS    CBC WITHOUT DIFFERENTIAL    Collection Time: 22  4:50 AM   Result Value Ref Range    WBC 8.3 4.8 - 10.8 K/uL    RBC 4.62 (L) 4.70 - 6.10 M/uL    Hemoglobin 15.1 14.0 - 18.0 g/dL    Hematocrit 43.7 42.0 - 52.0 %    MCV 94.6 81.4 - 97.8 fL    MCH 32.7 27.0 - 33.0 pg    MCHC 34.6 33.7 - 35.3 g/dL    RDW 46.1 35.9 - 50.0 fL    Platelet Count 388 164 - 446 K/uL    MPV 9.0 9.0 - 12.9 fL   Comp Metabolic Panel    Collection Time: 22  4:50 AM   Result Value Ref Range    Sodium 139 135 - 145 mmol/L    Potassium 3.9 3.6 - 5.5 mmol/L    Chloride 106 96 - 112 mmol/L    Co2 22 20 - 33 mmol/L    Anion Gap 11.0 7.0 - 16.0    Glucose 106 (H) 65 - 99 mg/dL    Bun 11 8 - 22 mg/dL    Creatinine 0.74 0.50 - 1.40 mg/dL    Calcium 8.3 (L) 8.5 - 10.5 mg/dL    AST(SGOT) 23 12 - 45 U/L    ALT(SGPT) 31 2 - 50 U/L    Alkaline Phosphatase 89 30 - 99 U/L    Total Bilirubin 0.7 0.1 - 1.5 mg/dL    Albumin 3.3 3.2 - 4.9 g/dL    Total Protein 6.5 6.0 - 8.2 g/dL    Globulin 3.2 1.9 - 3.5 g/dL     A-G Ratio 1.0 g/dL   MAGNESIUM    Collection Time: 01/28/22  4:50 AM   Result Value Ref Range    Magnesium 2.1 1.5 - 2.5 mg/dL   PHOSPHORUS    Collection Time: 01/28/22  4:50 AM   Result Value Ref Range    Phosphorus 3.0 2.5 - 4.5 mg/dL   Lipid Profile    Collection Time: 01/28/22  4:50 AM   Result Value Ref Range    Cholesterol,Tot 183 100 - 199 mg/dL    Triglycerides 128 0 - 149 mg/dL    HDL 23 (A) >=40 mg/dL     (H) <100 mg/dL   ESTIMATED GFR    Collection Time: 01/28/22  4:50 AM   Result Value Ref Range    GFR If African American >60 >60 mL/min/1.73 m 2    GFR If Non African American >60 >60 mL/min/1.73 m 2   POCT glucose device results    Collection Time: 01/28/22  4:59 AM   Result Value Ref Range    Glucose - Accu-Ck 96 65 - 99 mg/dL   POCT glucose device results    Collection Time: 01/28/22 12:04 PM   Result Value Ref Range    Glucose - Accu-Ck 114 (H) 65 - 99 mg/dL       ASSESSMENT:  IMPRESSION: The patient is a 66 y.o. 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 Genaro Sanchez MD, on 1/27/2022    [facility] Code: 0001.1 - Stroke: Left Body Involvement (Right Brain)  -With acute secondary complications of: impaired ADLs and mobility, impaired cognition, right sided weakness  -and:     Medical Complexity:  +COVID19    Data points:  Reviewed results of radiology tests - noted above  Reviewed clinical lab tests - noted above  Reviewed old records - noted above    RECOMMENDATIONS:  ##MSK  #Impaired ADLs and mobility: Agree with continuing OT/PT/SLP while admitted here.    Will likely benefit from acute inpatient rehabilitation for OT, PT, and SLP. 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 Genaro Sanchez MD, on 1/27/2022  Neurology following    ##SKIN  Recommend turning Q2hr and monitor for skin changes closely  PRAFO to be switched between left and right foot    Code: full resuscitation    Thank you for allowing us to participate in the care of this patient. Physiatry will continue to follow and provide recommendations, as needed.    Jeancarlos Mcfarlane D.O.   Physical Medicine and Rehabilitation     Please note that this dictation was created using voice recognition software. I have made every reasonable attempt to correct obvious errors, but there may be errors of grammar and possibly content that I did not discover before finalizing the note.

## 2022-01-28 NOTE — ED NOTES
Unable to complete med rec at this time  Pt went up to floor right away   No pharmacy on file   Called pt's wife, no answer at this time

## 2022-01-28 NOTE — ASSESSMENT & PLAN NOTE
1/27 R M2 occlusion s/p IV-tPA and thrombectomy, TICI 2b  1/28 hemorrhagic transformation  1/29 occipital parietal occulsion, thrombectomy completed  Continue heparin gtt  Maintain -140  Maintain euglycemic and euthermic   Stat CT head if neuro changes  Echo completed  A1c 6.6  Continue atorvastatin  PT/OT

## 2022-01-28 NOTE — CONSULTS
Critical Care Consultation    Date of consult: 1/27/2022    Referring Physician  Morgan Beyer M.D.    Reason for Consultation  Acute stroke    History of Presenting Illness  66 y.o. male with unknown PMHx who presented 1/27/2022 with L sided weakness and slurred speech; found to have R MCA occlusion s/p IV TPa and thrombectomy. He had witnessed abrupt onset of symptoms at 1400.     NIHSS was 16 with features of R MCA syndrome.  Stroke protocol CT did not revealed a large territory stroke or hemorrhage.  CT angiogram with R M2 occlusion.      He received IV-tPA and was taken to cath lab for mechanical thrombectomy x 3 by Tereso resulting in TICI 2 b flow.    He arrived to the ICU awake and alert, able to move all 4 extremities.     Code Status  Full Code    Review of Systems  Review of Systems   Constitutional: Positive for malaise/fatigue. Negative for chills and fever.   Respiratory: Positive for cough. Negative for shortness of breath.    Cardiovascular: Negative for chest pain.   Gastrointestinal: Negative for abdominal pain, nausea and vomiting.   Musculoskeletal: Positive for myalgias.   Neurological: Positive for speech change and focal weakness.       Past Medical History   has a past medical history of right BKA (HCC) and Stroke ([facility]).    Surgical History   has no past surgical history on file.    Family History  family history is not on file.    Social History   reports that he has been smoking. He has never used smokeless tobacco. He reports current drug use. Drug: Inhaled. He reports that he does not drink alcohol.    Medications  Home Medications     Reviewed by Renee Nava (Pharmacy Tech) on 01/27/22 at 1801  Med List Status: Complete   Medication Last Dose Status   acetaminophen (TYLENOL) 325 MG Tab 1/26/2022 Active              Current Facility-Administered Medications   Medication Dose Route Frequency Provider Last Rate Last Admin   • senna-docusate (PERICOLACE or SENOKOT S) 8.6-50 MG per tablet  2 Tablet  2 Tablet Oral BID Alec Block M.D.        And   • polyethylene glycol/lytes (MIRALAX) PACKET 1 Packet  1 Packet Oral QDAY PRN Alec Block M.D.        And   • magnesium hydroxide (MILK OF MAGNESIA) suspension 30 mL  30 mL Oral QDAY PRN Alec Block M.D.        And   • bisacodyl (DULCOLAX) suppository 10 mg  10 mg Rectal QDAY PRN Alec Block M.D.       • acetaminophen (Tylenol) tablet 650 mg  650 mg Oral Q6HRS PRN Alec Block M.D.       • ondansetron (ZOFRAN) syringe/vial injection 4 mg  4 mg Intravenous Q4HRS PRN Alec Block M.D.       • ondansetron (ZOFRAN ODT) dispertab 4 mg  4 mg Oral Q4HRS PRN Alec Block M.D.       • insulin regular (HumuLIN R,NovoLIN R) injection  1-6 Units Subcutaneous Q6HRS Alec Block M.D.        And   • dextrose 50% (D50W) injection 50 mL  50 mL Intravenous Q15 MIN PRN Alec Block M.D.       • NS infusion   Intravenous Continuous Alec Block M.D. 50 mL/hr at 01/27/22 1750 New Bag at 01/27/22 1750   • atorvastatin (LIPITOR) tablet 80 mg  80 mg Oral Q EVENING Alec Block M.D.       • labetalol (NORMODYNE/TRANDATE) injection 10 mg  10 mg Intravenous Q10 MIN PRN Genaro Sanchez M.D.       • hydrALAZINE (APRESOLINE) injection 10 mg  10 mg Intravenous Q2HRS PRN Genaro Sanchez M.D.       • niCARdipine (CARDENE) 25 mg in  mL Infusion  0-15 mg/hr Intravenous Continuous Genaro Sanchez M.D.   Held at 01/27/22 1745   • norepinephrine (Levophed) 8 mg in 250 mL NS infusion (premix)  0-30 mcg/min Intravenous Continuous Alec Block M.D.   Held at 01/27/22 1745       Allergies  Allergies   Allergen Reactions   • Banana Hives and Itching   • Grape, Artificial Hives and Itching     ALLERGIC TO GRAPES.       Vital Signs last 24 hours  Temp:  [35.6 °C (96 °F)-36.7 °C (98 °F)] 35.6 °C (96 °F)  Pulse:  [70-87] 73  Resp:  [12-38] 12  BP: (133-159)/(70-94) 140/79  SpO2:  [93 %-100 %] 95 %    Physical Exam  Physical  Exam  Vitals and nursing note reviewed.   Constitutional:       Appearance: He is ill-appearing.   HENT:      Head: Normocephalic and atraumatic.      Right Ear: External ear normal.      Left Ear: External ear normal.      Nose: Nose normal.      Mouth/Throat:      Mouth: Mucous membranes are moist.   Eyes:      Pupils: Pupils are equal, round, and reactive to light.   Cardiovascular:      Rate and Rhythm: Normal rate and regular rhythm.      Pulses: Normal pulses.   Pulmonary:      Effort: Pulmonary effort is normal. No respiratory distress.   Abdominal:      General: Abdomen is flat. There is no distension.   Musculoskeletal:      Cervical back: No rigidity.      Left lower leg: No edema.      Comments: Prior AKA   Lymphadenopathy:      Cervical: No cervical adenopathy.   Skin:     General: Skin is warm and dry.      Capillary Refill: Capillary refill takes less than 2 seconds.   Neurological:      Mental Status: He is alert.      Comments: Awake and alert, clear and coherent speech. Able to move all 4 extremities    Psychiatric:         Mood and Affect: Mood normal.         Fluids    Intake/Output Summary (Last 24 hours) at 1/27/2022 2018  Last data filed at 1/27/2022 1900  Gross per 24 hour   Intake 58.33 ml   Output 400 ml   Net -341.67 ml       Laboratory  Recent Results (from the past 48 hour(s))   CBC WITH DIFFERENTIAL    Collection Time: 01/27/22  3:08 PM   Result Value Ref Range    WBC 7.1 4.8 - 10.8 K/uL    RBC 5.12 4.70 - 6.10 M/uL    Hemoglobin 16.4 14.0 - 18.0 g/dL    Hematocrit 48.3 42.0 - 52.0 %    MCV 94.3 81.4 - 97.8 fL    MCH 32.0 27.0 - 33.0 pg    MCHC 34.0 33.7 - 35.3 g/dL    RDW 45.6 35.9 - 50.0 fL    Platelet Count 404 164 - 446 K/uL    MPV 8.9 (L) 9.0 - 12.9 fL    Neutrophils-Polys 67.40 44.00 - 72.00 %    Lymphocytes 22.20 22.00 - 41.00 %    Monocytes 9.70 0.00 - 13.40 %    Eosinophils 0.30 0.00 - 6.90 %    Basophils 0.10 0.00 - 1.80 %    Immature Granulocytes 0.30 0.00 - 0.90 %     Nucleated RBC 0.00 /100 WBC    Neutrophils (Absolute) 4.80 1.82 - 7.42 K/uL    Lymphs (Absolute) 1.58 1.00 - 4.80 K/uL    Monos (Absolute) 0.69 0.00 - 0.85 K/uL    Eos (Absolute) 0.02 0.00 - 0.51 K/uL    Baso (Absolute) 0.01 0.00 - 0.12 K/uL    Immature Granulocytes (abs) 0.02 0.00 - 0.11 K/uL    NRBC (Absolute) 0.00 K/uL   COMP METABOLIC PANEL    Collection Time: 01/27/22  3:08 PM   Result Value Ref Range    Sodium 138 135 - 145 mmol/L    Potassium 3.8 3.6 - 5.5 mmol/L    Chloride 102 96 - 112 mmol/L    Co2 23 20 - 33 mmol/L    Anion Gap 13.0 7.0 - 16.0    Glucose 115 (H) 65 - 99 mg/dL    Bun 11 8 - 22 mg/dL    Creatinine 0.78 0.50 - 1.40 mg/dL    Calcium 8.8 8.5 - 10.5 mg/dL    AST(SGOT) 29 12 - 45 U/L    ALT(SGPT) 45 2 - 50 U/L    Alkaline Phosphatase 99 30 - 99 U/L    Total Bilirubin 0.7 0.1 - 1.5 mg/dL    Albumin 3.3 3.2 - 4.9 g/dL    Total Protein 7.5 6.0 - 8.2 g/dL    Globulin 4.2 (H) 1.9 - 3.5 g/dL    A-G Ratio 0.8 g/dL   PROTHROMBIN TIME    Collection Time: 01/27/22  3:08 PM   Result Value Ref Range    PT 13.9 12.0 - 14.6 sec    INR 1.10 0.87 - 1.13   APTT    Collection Time: 01/27/22  3:08 PM   Result Value Ref Range    APTT 42.1 (H) 24.7 - 36.0 sec   TROPONIN    Collection Time: 01/27/22  3:08 PM   Result Value Ref Range    Troponin T 7 6 - 19 ng/L   ESTIMATED GFR    Collection Time: 01/27/22  3:08 PM   Result Value Ref Range    GFR If African American >60 >60 mL/min/1.73 m 2    GFR If Non African American >60 >60 mL/min/1.73 m 2   COV-2, FLU A/B, AND RSV BY PCR (2-4 HOURS CEPHEID): Collect NP swab in VTM    Collection Time: 01/27/22  5:45 PM    Specimen: Nasopharyngeal; Respirate   Result Value Ref Range    Influenza virus A RNA Negative Negative    Influenza virus B, PCR Negative Negative    RSV, PCR Negative Negative    SARS-CoV-2 by PCR DETECTED (AA)     SARS-CoV-2 Source NP Swab    POCT glucose device results    Collection Time: 01/27/22  5:47 PM   Result Value Ref Range    Glucose - Accu-Ck 104 (H)  65 - 99 mg/dL   EKG (NOW)    Collection Time: 22  6:47 PM   Result Value Ref Range    Report       [facility]    Test Date:  2022  Pt Name:    BRIANNE CREWS                Department:   MRN:        [id number]                      Room:       [facility]  Gender:     Male                         Technician: MARY ELLEN  :        [id number]                   Requested By:REMY CARRERO  Order #:    [id number]                    Reading MD:    Measurements  Intervals                                Axis  Rate:       74                           P:          55  ME:         152                          QRS:        14  QRSD:       70                           T:          33  QT:         412  QTc:        457    Interpretive Statements  SINUS RHYTHM  BORDERLINE LOW VOLTAGE IN FRONTAL LEADS  No previous ECG available for comparison         Imaging  US-EXTREMITY VENOUS LOWER BILAT   Final Result      DX-CHEST-PORTABLE (1 VIEW)   Final Result      Patchy airspace process consistent with edema or atelectasis      CT-CTA NECK WITH & W/O-POST PROCESSING   Final Result   Addendum 1 of 1   Addendum:   There is a small nonocclusive arterial thrombus within the right    innominate artery. This is seen on series 9 image 116.      These findings were discussed with REMY CARRERO by Dr. Rock on    2022.      Final      1.  No hemodynamically significant stenosis of the neck arteries.   2.  Bilateral ill-defined pulmonary opacities in keeping with Covid pneumonia.      CT-CTA HEAD WITH & W/O-POST PROCESS   Final Result      1.  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            Comment: Results discussed with Dr. Carrero at approximately 3:35 PM      CT-CEREBRAL PERFUSION ANALYSIS   Final Result      1.  Nondiagnostic scan secondary to patient motion.      CT-HEAD W/O   Final Result         NO ACUTE ABNORMALITIES ARE NOTED ON CT SCAN OF THE HEAD.               IR-THROMBO MECHANICAL ARTERY,INIT     (Results Pending)   EC-ECHOCARDIOGRAM COMPLETE W/O CONT    (Results Pending)   MR-BRAIN-W/O    (Results Pending)   CT-HEAD W/O    (Results Pending)   MR-BRAIN-W/O    (Results Pending)   CT-HEAD W/O    (Results Pending)       Assessment/Plan  * Acute ischemic stroke (HCC)- (present on admission)  Assessment & Plan  1/27 R M2 occlusion s/p thrombectomy x 3 by Tereso    Maintain normal temperature and glucose  HOB > 30 degrees  TICI 2b: maintain systolic 120-160  Stat CT head if decline in neuro exam  MRI in the AM  Formal ECHO  Statin  Lipid panel/A1c  Q1 hour neuro checks    COVID-19  Assessment & Plan  Causing hypercoagulability and CVA     No hypoxia  Formal ECHO  LE US  Precautions       Discussed patient condition and risk of morbidity and/or mortality with RN, RT, Therapies, Pharmacy, Charge nurse / hot rounds and Patient.    The patient remains critically ill.  Critical care time = 61 minutes in directly providing and coordinating critical care and extensive data review.  No time overlap and excludes procedures.

## 2022-01-28 NOTE — ASSESSMENT & PLAN NOTE
Presenting with ischemic stroke, now with hemorrhagic transformation  A1c 6.6  Has not required SSI  Carbohydrate consistent diet   Consider oral antidiabetics on discharge

## 2022-01-28 NOTE — CARE PLAN
Problem: Optimal Care of the Stroke Patient  Goal: Optimal emergency care for the stroke patient  Outcome: Progressing  Goal: Optimal acute care for the stroke patient  Outcome: Progressing     Problem: Knowledge Deficit - Stroke Education  Goal: Patient's knowledge of stroke and risk factors will improve  Outcome: Progressing     Problem: Psychosocial - Patient Condition  Goal: Patient's ability to verbalize feelings about condition will improve  Outcome: Progressing  Goal: Patient's ability to re-evaluate and adapt role responsibilities will improve  Outcome: Progressing     Problem: Discharge Planning - Stroke  Goal: Ensure Stroke Core Measures are met prior to discharge  Outcome: Progressing  Goal: Patient’s continuum of care needs will be met  Outcome: Progressing     Problem: Neuro Status  Goal: Neuro status will remain stable or improve  Outcome: Progressing     Problem: Hemodynamic Monitoring  Goal: Patient's hemodynamics, fluid balance and neurologic status will be stable or improve  Outcome: Progressing     Problem: Respiratory - Stroke Patient  Goal: Patient will achieve/maintain optimum respiratory rate/effort  Outcome: Progressing     Problem: Dysphagia  Goal: Dysphagia will improve  Outcome: Progressing     Problem: Risk for Aspiration  Goal: Patient's risk for aspiration will be absent or decrease  Outcome: Progressing     Problem: Urinary Elimination  Goal: Establish and maintain regular urinary output  Outcome: Progressing     Problem: Bowel Elimination  Goal: Establish and maintain regular bowel function  Outcome: Progressing     Problem: Mobility - Stroke  Goal: Patient's capacity to carry out activities will improve  Outcome: Progressing  Goal: Spasticity will be prevented or improved  Outcome: Progressing  Goal: Subluxation will be prevented or improved  Outcome: Progressing     Problem: Self Care  Goal: Patient will have the ability to perform ADLs independently or with assistance (bathe,  groom, dress, toilet and feed)  Outcome: Progressing     Problem: Knowledge Deficit - Standard  Goal: Patient and family/care givers will demonstrate understanding of plan of care, disease process/condition, diagnostic tests and medications  Outcome: Progressing   The patient is Watcher - Medium risk of patient condition declining or worsening

## 2022-01-28 NOTE — PROGRESS NOTES
Lab called with critical result of Covid positive at 1950. Critical lab result read back to LAB.   Dr. Knowles notified of critical lab result at 2000.  Critical lab result read back by Dr. Knowles.

## 2022-01-28 NOTE — PROGRESS NOTES
Critical Care Progress Note    Date of admission  1/27/2022    Chief Complaint  66 y.o. male admitted 1/27/2022 with left sided weakness and slurred speach    Hospital Course  Mr. Scott is a 66 year old male with history of traumatic left BKA presented 1/27/22 for acute onset left sided weakness and slurred speech. His symptoms started abruptly at 1400 1/27/21. He was brought in by EMS for a code stroke, NIH on arrival was 16.Imaging revealed a right M2 occulsion. Patient received IV-tPA and underwent endovascular clot retrieval resulting in TICI 2b flow.       Interval Problem Update  -143, Goal -160  Patient moving all 4 extremities, mild drift of LUE and mild dysarthria   Cleared by SLP for diet  PT/OT pending  Echo pending  MRI head pending  A1c 6.6  Covid positive, on room air, denying respiratory symptoms  BLE US negative for DVTs    Review of Systems  Review of Systems   Constitutional: Negative for chills and fever.   HENT: Negative for congestion and sore throat.    Eyes: Negative for blurred vision and double vision.   Respiratory: Negative for cough and shortness of breath.    Cardiovascular: Negative for chest pain and palpitations.   Gastrointestinal: Negative for abdominal pain and vomiting.   Genitourinary: Negative.    Musculoskeletal: Negative for myalgias and neck pain.   Skin: Negative.    Neurological: Positive for focal weakness. Negative for dizziness.   Psychiatric/Behavioral: Negative for depression and suicidal ideas.        Vital Signs for last 24 hours   Temp:  [35.6 °C (96 °F)-36.4 °C (97.5 °F)] 36.3 °C (97.3 °F)  Pulse:  [62-89] 86  Resp:  [12-78] 22  BP: (117-162)/(60-94) 133/76  SpO2:  [90 %-100 %] 92 %    Hemodynamic parameters for last 24 hours       Respiratory Information for the last 24 hours       Physical Exam   Physical Exam  Vitals and nursing note reviewed.   Constitutional:       Appearance: Normal appearance.   HENT:      Head: Normocephalic and atraumatic.       Right Ear: External ear normal.      Left Ear: External ear normal.      Nose: Nose normal. No congestion.      Mouth/Throat:      Mouth: Mucous membranes are dry.      Pharynx: Oropharynx is clear.   Eyes:      General:         Right eye: No discharge.         Left eye: No discharge.      Pupils: Pupils are equal, round, and reactive to light.   Cardiovascular:      Rate and Rhythm: Normal rate and regular rhythm.      Pulses: Normal pulses.      Heart sounds: No murmur heard.      Pulmonary:      Effort: Pulmonary effort is normal.      Breath sounds: Normal breath sounds.   Abdominal:      General: Abdomen is flat. Bowel sounds are normal.      Palpations: Abdomen is soft.   Musculoskeletal:      Cervical back: Neck supple. No tenderness.      Right lower leg: No edema.      Left lower leg: No edema.      Comments: Right BKA    Skin:     General: Skin is warm and dry.      Capillary Refill: Capillary refill takes 2 to 3 seconds.   Neurological:      Mental Status: He is alert.      Sensory: Sensation is intact.      Motor: Weakness present.      Comments: Mild dysphasia, Right arm drift     Psychiatric:         Attention and Perception: Attention normal.         Mood and Affect: Mood normal.         Behavior: Behavior normal.         Medications  Current Facility-Administered Medications   Medication Dose Route Frequency Provider Last Rate Last Admin   • hydrALAZINE (APRESOLINE) injection 10-20 mg  10-20 mg Intravenous Q2HRS PRN Eliane Hoganer, A.P.R.N.       • labetalol (NORMODYNE/TRANDATE) injection 10 mg  10 mg Intravenous Q10 MIN PRN Eliane Woodson, A.P.R.N.       • senna-docusate (PERICOLACE or SENOKOT S) 8.6-50 MG per tablet 2 Tablet  2 Tablet Oral BID Alec Block M.D.        And   • polyethylene glycol/lytes (MIRALAX) PACKET 1 Packet  1 Packet Oral QDAY PRN Alec Block M.D.        And   • magnesium hydroxide (MILK OF MAGNESIA) suspension 30 mL  30 mL Oral QDAY PRN Alec Block M.D.         And   • bisacodyl (DULCOLAX) suppository 10 mg  10 mg Rectal QDAY PRN Alec Block M.D.       • acetaminophen (Tylenol) tablet 650 mg  650 mg Oral Q6HRS PRN Alec Block M.D.       • ondansetron (ZOFRAN) syringe/vial injection 4 mg  4 mg Intravenous Q4HRS PRN Alec Block M.D.       • ondansetron (ZOFRAN ODT) dispertab 4 mg  4 mg Oral Q4HRS PRN Alec Block M.D.       • insulin regular (HumuLIN R,NovoLIN R) injection  1-6 Units Subcutaneous Q6HRS Alec Block M.D.        And   • dextrose 50% (D50W) injection 50 mL  50 mL Intravenous Q15 MIN PRN Alec Block M.D.       • atorvastatin (LIPITOR) tablet 80 mg  80 mg Oral Q EVENING Alec Block M.D.           Fluids    Intake/Output Summary (Last 24 hours) at 1/28/2022 1602  Last data filed at 1/28/2022 1027  Gross per 24 hour   Intake 830.83 ml   Output 1250 ml   Net -419.17 ml       Laboratory          Recent Labs     01/27/22  1508 01/28/22  0450   SODIUM 138 139   POTASSIUM 3.8 3.9   CHLORIDE 102 106   CO2 23 22   BUN 11 11   CREATININE 0.78 0.74   MAGNESIUM  --  2.1   PHOSPHORUS  --  3.0   CALCIUM 8.8 8.3*     Recent Labs     01/27/22  1508 01/28/22  0450   ALTSGPT 45 31   ASTSGOT 29 23   ALKPHOSPHAT 99 89   TBILIRUBIN 0.7 0.7   GLUCOSE 115* 106*     Recent Labs     01/27/22  1508 01/28/22  0450   WBC 7.1 8.3   NEUTSPOLYS 67.40  --    LYMPHOCYTES 22.20  --    MONOCYTES 9.70  --    EOSINOPHILS 0.30  --    BASOPHILS 0.10  --    ASTSGOT 29 23   ALTSGPT 45 31   ALKPHOSPHAT 99 89   TBILIRUBIN 0.7 0.7     Recent Labs     01/27/22  1508 01/28/22  0450   RBC 5.12 4.62*   HEMOGLOBIN 16.4 15.1   HEMATOCRIT 48.3 43.7   PLATELETCT 404 388   PROTHROMBTM 13.9  --    APTT 42.1*  --    INR 1.10  --        Imaging  X-Ray:  No film today    Assessment/Plan  * Acute ischemic stroke (HCC)- (present on admission)  Assessment & Plan  1/27 R M2 occlusion s/p IV-tPA and thrombectomy, TICI 2b  1/28 hemorrhagic transformation, discussed with neuro team,  new SBP goal 100-140, labetalol and hydralazine PRN  Maintain euglycemic and euthermic   HOB > 30 degrees  Stat CT head if neuro changes  Echo pending  A1c 6.6  , started on atorvastatin 80mg  Q1 hour neuro checks  PT/OT pending    Type 2 diabetes mellitus with neurologic complication, without long-term current use of insulin (HCC)- (present on admission)  Assessment & Plan  Presenting with ischemic stroke, now with hemorrhagic transformation  A1c 6.6  Continue SSI  Carbohydrate consistent diet   Consider oral antidiabetics on discharge    COVID-19  Assessment & Plan  Contributing to hypercoagulability and CVA   Patient denies respiratory symptoms, on room air  DVT study negative   Continue enhanced droplet isolation          VTE:  SCDs only  Ulcer: Not Indicated  Lines: None    I have performed a physical exam and reviewed and updated ROS and Plan today (1/28/2022). In review of yesterday's note (1/27/2022), there are no changes except as documented above.     Discussed patient condition and risk of morbidity and/or mortality with RN, Therapies, Pharmacy, Code status disscussed, Charge nurse / hot rounds, Patient and neurology  The patient remains critically ill.  Critical care time = 45 minutes in directly providing and coordinating critical care and extensive data review.  No time overlap and excludes procedures.

## 2022-01-28 NOTE — PROGRESS NOTES
Neurology Progress Note  Neurohospitalist Service, [facility] Neurosciences    Referring Physician: Jeremy M Gonda, M.D.      Interval History: No acute events overnight.  Improving exam post-op.  COVID+.  No DVTs.    Review of systems: In addition to what is detailed in the HPI and/or updated in the interval history, all other systems reviewed and are negative.    Past Medical History, Past Surgical History and Social History reviewed and unchanged from prior    Medications:    Current Facility-Administered Medications:   •  senna-docusate (PERICOLACE or SENOKOT S) 8.6-50 MG per tablet 2 Tablet, 2 Tablet, Oral, BID **AND** polyethylene glycol/lytes (MIRALAX) PACKET 1 Packet, 1 Packet, Oral, QDAY PRN **AND** magnesium hydroxide (MILK OF MAGNESIA) suspension 30 mL, 30 mL, Oral, QDAY PRN **AND** bisacodyl (DULCOLAX) suppository 10 mg, 10 mg, Rectal, QDAY PRN, Alec Block M.D.  •  acetaminophen (Tylenol) tablet 650 mg, 650 mg, Oral, Q6HRS PRN, Alec Block M.D.  •  ondansetron (ZOFRAN) syringe/vial injection 4 mg, 4 mg, Intravenous, Q4HRS PRN, Alec Block M.D.  •  ondansetron (ZOFRAN ODT) dispertab 4 mg, 4 mg, Oral, Q4HRS PRN, Alec Block M.D.  •  insulin regular (HumuLIN R,NovoLIN R) injection, 1-6 Units, Subcutaneous, Q6HRS **AND** POC blood glucose manual result, , , Q6H **AND** NOTIFY MD and PharmD, , , Once **AND** Administer 20 grams of glucose (approximately 8 ounces of fruit juice) every 15 minutes PRN FSBG less than 70 mg/dL, , , PRN **AND** dextrose 50% (D50W) injection 50 mL, 50 mL, Intravenous, Q15 MIN PRN, Alec Block M.D.  •  NS infusion, , Intravenous, Continuous, Alec Block M.D., Last Rate: 50 mL/hr at 01/27/22 1750, New Bag at 01/27/22 1750  •  atorvastatin (LIPITOR) tablet 80 mg, 80 mg, Oral, Q EVENING, Alec Block M.D.  •  labetalol (NORMODYNE/TRANDATE) injection 10 mg, 10 mg, Intravenous, Q10 MIN PRN, Genaro Sanchez M.D., 10 mg at 01/27/22  2040  •  hydrALAZINE (APRESOLINE) injection 10 mg, 10 mg, Intravenous, Q2HRS PRN, Genaro Sanchez M.D.    Physical Examination:   /76   Pulse 78   Temp 36.2 °C (97.2 °F) (Temporal)   Resp (!) 23   Wt 67.2 kg (148 lb 2.4 oz)   SpO2 92%   BMI 23.91 kg/m²       General: Patient is awake and in no acute distress  Neck: There is normal range of motion  CV: Regular rate   Extremities:  Warm, dry, and intact, without peripheral lower extremity edema    NEUROLOGICAL EXAM:     Mental status: Awake, alert and fully oriented, follows commands  Speech and language: Speech with mild dysarthria but fluent. The patient is able to name and repeat.  Cranial nerve exam: Pupils are equal, round and reactive to light bilaterally. Visual fields are full. There is no nystagmus. Extraocular muscles are intact. Face is symmetric. Tongue is midline.  Motor exam: There is sustained antigravity with no downward drift in bilateral arms and legs.  There is no pronator drift .  Tone is normal. No abnormal movements were seen on exam.  Sensory exam:  Reacts to tactile in all 4 extremities, there is neglect to double stim on L.  Some left/right confusion  Coordination: No ataxia on bilateral FTN testing      NIHSS: National Institutes of Health Stroke Scale    [0] 1a:Level of Consciousness    0-alert 1-drowsy   2-stupor   3-coma  [0] 1b:LOC Questions                  0-both  1-one      2-neither  [0] 1c:LOC Commands                   0-both  1-one      2-neither  [0] 2: Best Gaze                     0-nl    1-partial  2-forced  [0] 3: Visual Fields                   0-nl    1-partial  2-complete 3-bilat  [0] 4: Facial Paresis                0-nl    1-minor    2-partial  3-full  MOTOR                       0-nl  [0] 5: Right Arm           1-drift  [0] 6: Left Arm             2-some effort vs gravity  [0] 7: Right Leg           3-no effort vs gravity  [0] 8: Left Leg             4-no movement                              x-untestable  [0] 9: Limb Ataxia                    0-abs   1-1_limb   2-2+_limbs       x-untestable  [0] 10:Sensory                        0-nl    1-partial  2-dense  [0] 11:Best Language/Aphasia         0-nl    1-mild/mod 2-severe   3-mute  [1] 12:Dysarthria                     0-nl    1-mild/mod 2-severe       x-untestable  [1] 13:Neglect/Inattention            0-none  1-partial  2-complete  [2] TOTAL      Objective Data:    Labs:  Lab Results   Component Value Date/Time    PROTHROMBTM 13.9 01/27/2022 03:08 PM    INR 1.10 01/27/2022 03:08 PM      Lab Results   Component Value Date/Time    WBC 8.3 01/28/2022 04:50 AM    RBC 4.62 (L) 01/28/2022 04:50 AM    HEMOGLOBIN 15.1 01/28/2022 04:50 AM    HEMATOCRIT 43.7 01/28/2022 04:50 AM    MCV 94.6 01/28/2022 04:50 AM    MCH 32.7 01/28/2022 04:50 AM    MCHC 34.6 01/28/2022 04:50 AM    MPV 9.0 01/28/2022 04:50 AM    NEUTSPOLYS 67.40 01/27/2022 03:08 PM    LYMPHOCYTES 22.20 01/27/2022 03:08 PM    MONOCYTES 9.70 01/27/2022 03:08 PM    EOSINOPHILS 0.30 01/27/2022 03:08 PM    BASOPHILS 0.10 01/27/2022 03:08 PM      Lab Results   Component Value Date/Time    SODIUM 139 01/28/2022 04:50 AM    POTASSIUM 3.9 01/28/2022 04:50 AM    CHLORIDE 106 01/28/2022 04:50 AM    CO2 22 01/28/2022 04:50 AM    GLUCOSE 106 (H) 01/28/2022 04:50 AM    BUN 11 01/28/2022 04:50 AM    CREATININE 0.74 01/28/2022 04:50 AM      Lab Results   Component Value Date/Time    CHOLSTRLTOT 183 01/28/2022 04:50 AM     (H) 01/28/2022 04:50 AM    HDL 23 (A) 01/28/2022 04:50 AM    TRIGLYCERIDE 128 01/28/2022 04:50 AM       Lab Results   Component Value Date/Time    ALKPHOSPHAT 89 01/28/2022 04:50 AM    ASTSGOT 23 01/28/2022 04:50 AM    ALTSGPT 31 01/28/2022 04:50 AM    TBILIRUBIN 0.7 01/28/2022 04:50 AM        Imaging/Testing:    I interpreted and/or reviewed the patient's neuroimaging    US-EXTREMITY VENOUS LOWER BILAT   Final Result      DX-CHEST-PORTABLE (1 VIEW)   Final Result      Patchy airspace  process consistent with edema or atelectasis      CT-CTA NECK WITH & W/O-POST PROCESSING   Final Result   Addendum 1 of 1   Addendum:   There is a small nonocclusive arterial thrombus within the right    innominate artery. This is seen on series 9 image 116.      These findings were discussed with REMY CARRERO by Dr. Rock on    1/27/2022.      Final      1.  No hemodynamically significant stenosis of the neck arteries.   2.  Bilateral ill-defined pulmonary opacities in keeping with Covid pneumonia.      CT-CTA HEAD WITH & W/O-POST PROCESS   Final Result      1.  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            Comment: Results discussed with Dr. Carrero at approximately 3:35 PM      CT-CEREBRAL PERFUSION ANALYSIS   Final Result      1.  Nondiagnostic scan secondary to patient motion.      CT-HEAD W/O   Final Result         NO ACUTE ABNORMALITIES ARE NOTED ON CT SCAN OF THE HEAD.               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   His clinical exam is much improved.  He is COVID positive, and suspect stroke likely related to COVID hypercoagulability.  Will complete embolic workup with ECHO and long-term cardiac monitoring.  Will need at least short-term anticoagulation, timing of initiation TBD pending MRI results.      Problem list:  1.  R MCA stroke   2.  S/p IV-tPA and mechanical thrombectomy  3.  COVID positive    Plan:   - neurochecks/NIHSS per post-tPA protocol, then ok for q4h   - SBP goal 120-160 given TICI 2b reperfusion   - expedite MRI brain without contrast, does not need to be 24 hours   - repeat CTH only if MRI not completed or  there is clinical deterioration   - no antithrombotic therapy x 24 hours, anticipate initiating apixaban 5mg BID for secondary storke prevention, timing TBD pending MRI results   - stroke labs:  HgbA1c and    - continue atorvastatin 80mg daily for goal LDL < 70   - TTE and long-term cardiac monitoring at discharge   - PT/OT/SLP ok even within first 24 hours   - SCDs only for DVT ppx    The evaluation of the patient, and recommended management, was discussed with the resident staff. I have performed a physical exam and reviewed and updated ROS and Plan today (1/28/2022).     Alec Arechiga MD  Neurohospitalist, Acute Care Services

## 2022-01-28 NOTE — DISCHARGE PLANNING
[facility] Transitional Care Coordination    Referral from:  Dr. Block    Insurance Provider on Facesheet: No medical provider has been identified @ this time.  Please reach out to a PFA for a screening.    Potential Rehab Diagnosis: TBD    Chart review indicates patient may have on going medical management and may have therapy needs to possibly meet inpatient rehab facility criteria with the goal of returning to community.    D/C support: TBD     Physiatry consultation forwarded per protocol.     W/U & TX pending.    Last Covid test:       Thank you for the referral.

## 2022-01-28 NOTE — CARE PLAN
The patient is Watcher - Medium risk of patient condition declining or worsening    Shift Goals  Clinical Goals: Neuro check stability  Patient Goals: Drink water  Family Goals: Comfort    Progress made toward(s) clinical / shift goals: Patient left hand  still weaker than right, however patient reports increased dexterity.       Problem: Optimal Care of the Stroke Patient  Goal: Optimal emergency care for the stroke patient  Outcome: Progressing     Problem: Knowledge Deficit - Stroke Education  Goal: Patient's knowledge of stroke and risk factors will improve  Outcome: Progressing     Problem: Psychosocial - Patient Condition  Goal: Patient's ability to verbalize feelings about condition will improve  Outcome: Progressing

## 2022-01-28 NOTE — PROGRESS NOTES
Received call from patient's daughter Berdonna Wissar, per patient ok to give update over the phone.

## 2022-01-28 NOTE — PROGRESS NOTES
IR NOTE:  Pt transported to RICU on cardiac monitor without incident.  Pt gcs 15, neuro status reviewed with Sheree MULLEN, right groin site no signs of bleeding, swelling or hematoma.  Pt speech clearer and able to move all extremities.

## 2022-01-28 NOTE — PROGRESS NOTES
Med rec completed per patient and grandson at bedside.  Allergies reviewed; no known DRUG allergies.  Patient denies taking any prescription medications.  No vitamins/supplements.  No oral antibiotics in last 30 days.  Preferred pharmacy: [facility].

## 2022-01-29 ENCOUNTER — APPOINTMENT (OUTPATIENT)
Dept: RADIOLOGY | Facility: MEDICAL CENTER | Age: 67
DRG: 023 | End: 2022-01-29
Attending: STUDENT IN AN ORGANIZED HEALTH CARE EDUCATION/TRAINING PROGRAM
Payer: MEDICARE

## 2022-01-29 ENCOUNTER — APPOINTMENT (OUTPATIENT)
Dept: RADIOLOGY | Facility: MEDICAL CENTER | Age: 67
DRG: 023 | End: 2022-01-29
Attending: NURSE PRACTITIONER
Payer: MEDICARE

## 2022-01-29 ENCOUNTER — APPOINTMENT (OUTPATIENT)
Dept: RADIOLOGY | Facility: MEDICAL CENTER | Age: 67
DRG: 023 | End: 2022-01-29
Attending: PSYCHIATRY & NEUROLOGY
Payer: MEDICARE

## 2022-01-29 ENCOUNTER — APPOINTMENT (OUTPATIENT)
Dept: CARDIOLOGY | Facility: MEDICAL CENTER | Age: 67
DRG: 023 | End: 2022-01-29
Attending: INTERNAL MEDICINE
Payer: MEDICARE

## 2022-01-29 LAB
ALBUMIN SERPL BCP-MCNC: 3.6 G/DL (ref 3.2–4.9)
ALBUMIN/GLOB SERPL: 1.1 G/DL
ALP SERPL-CCNC: 100 U/L (ref 30–99)
ALT SERPL-CCNC: 30 U/L (ref 2–50)
ANION GAP SERPL CALC-SCNC: 12 MMOL/L (ref 7–16)
APTT PPP: 40.5 SEC (ref 24.7–36)
AST SERPL-CCNC: 24 U/L (ref 12–45)
BILIRUB SERPL-MCNC: 0.9 MG/DL (ref 0.1–1.5)
BUN SERPL-MCNC: 11 MG/DL (ref 8–22)
CALCIUM SERPL-MCNC: 8.5 MG/DL (ref 8.5–10.5)
CHLORIDE SERPL-SCNC: 105 MMOL/L (ref 96–112)
CO2 SERPL-SCNC: 20 MMOL/L (ref 20–33)
CREAT SERPL-MCNC: 0.72 MG/DL (ref 0.5–1.4)
ERYTHROCYTE [DISTWIDTH] IN BLOOD BY AUTOMATED COUNT: 45.1 FL (ref 35.9–50)
GLOBULIN SER CALC-MCNC: 3.4 G/DL (ref 1.9–3.5)
GLUCOSE BLD-MCNC: 85 MG/DL (ref 65–99)
GLUCOSE SERPL-MCNC: 92 MG/DL (ref 65–99)
HCT VFR BLD AUTO: 44.3 % (ref 42–52)
HGB BLD-MCNC: 15.2 G/DL (ref 14–18)
INR PPP: 1.23 (ref 0.87–1.13)
MAGNESIUM SERPL-MCNC: 2.2 MG/DL (ref 1.5–2.5)
MCH RBC QN AUTO: 32.4 PG (ref 27–33)
MCHC RBC AUTO-ENTMCNC: 34.3 G/DL (ref 33.7–35.3)
MCV RBC AUTO: 94.5 FL (ref 81.4–97.8)
PHOSPHATE SERPL-MCNC: 2.1 MG/DL (ref 2.5–4.5)
PLATELET # BLD AUTO: 358 K/UL (ref 164–446)
PMV BLD AUTO: 9.1 FL (ref 9–12.9)
POTASSIUM SERPL-SCNC: 4.3 MMOL/L (ref 3.6–5.5)
PROT SERPL-MCNC: 7 G/DL (ref 6–8.2)
PROTHROMBIN TIME: 15.1 SEC (ref 12–14.6)
RBC # BLD AUTO: 4.69 M/UL (ref 4.7–6.1)
SODIUM SERPL-SCNC: 137 MMOL/L (ref 135–145)
UFH PPP CHRO-ACNC: <0.1 IU/ML
UFH PPP CHRO-ACNC: <0.1 IU/ML
WBC # BLD AUTO: 9.9 K/UL (ref 4.8–10.8)

## 2022-01-29 PROCEDURE — B3161ZZ FLUOROSCOPY OF RIGHT INTERNAL CAROTID ARTERY USING LOW OSMOLAR CONTRAST: ICD-10-PCS | Performed by: RADIOLOGY

## 2022-01-29 PROCEDURE — 0042T CT-CEREBRAL PERFUSION ANALYSIS: CPT

## 2022-01-29 PROCEDURE — 85027 COMPLETE CBC AUTOMATED: CPT

## 2022-01-29 PROCEDURE — 99292 CRITICAL CARE ADDL 30 MIN: CPT | Mod: FS | Performed by: NURSE PRACTITIONER

## 2022-01-29 PROCEDURE — C1751 CATH, INF, PER/CENT/MIDLINE: HCPCS

## 2022-01-29 PROCEDURE — 82962 GLUCOSE BLOOD TEST: CPT

## 2022-01-29 PROCEDURE — 84100 ASSAY OF PHOSPHORUS: CPT

## 2022-01-29 PROCEDURE — 700101 HCHG RX REV CODE 250: Performed by: NURSE PRACTITIONER

## 2022-01-29 PROCEDURE — B548ZZA ULTRASONOGRAPHY OF SUPERIOR VENA CAVA, GUIDANCE: ICD-10-PCS | Performed by: RADIOLOGY

## 2022-01-29 PROCEDURE — 84145 PROCALCITONIN (PCT): CPT

## 2022-01-29 PROCEDURE — 86902 BLOOD TYPE ANTIGEN DONOR EA: CPT

## 2022-01-29 PROCEDURE — 36620 INSERTION CATHETER ARTERY: CPT

## 2022-01-29 PROCEDURE — 70496 CT ANGIOGRAPHY HEAD: CPT

## 2022-01-29 PROCEDURE — 700117 HCHG RX CONTRAST REV CODE 255: Performed by: PSYCHIATRY & NEUROLOGY

## 2022-01-29 PROCEDURE — 71045 X-RAY EXAM CHEST 1 VIEW: CPT

## 2022-01-29 PROCEDURE — B31F1ZZ FLUOROSCOPY OF LEFT VERTEBRAL ARTERY USING LOW OSMOLAR CONTRAST: ICD-10-PCS | Performed by: RADIOLOGY

## 2022-01-29 PROCEDURE — 700105 HCHG RX REV CODE 258: Performed by: PSYCHIATRY & NEUROLOGY

## 2022-01-29 PROCEDURE — 02HV33Z INSERTION OF INFUSION DEVICE INTO SUPERIOR VENA CAVA, PERCUTANEOUS APPROACH: ICD-10-PCS | Performed by: RADIOLOGY

## 2022-01-29 PROCEDURE — 700117 HCHG RX CONTRAST REV CODE 255: Performed by: STUDENT IN AN ORGANIZED HEALTH CARE EDUCATION/TRAINING PROGRAM

## 2022-01-29 PROCEDURE — 99153 MOD SED SAME PHYS/QHP EA: CPT

## 2022-01-29 PROCEDURE — 99233 SBSQ HOSP IP/OBS HIGH 50: CPT | Performed by: PSYCHIATRY & NEUROLOGY

## 2022-01-29 PROCEDURE — 80053 COMPREHEN METABOLIC PANEL: CPT

## 2022-01-29 PROCEDURE — 700105 HCHG RX REV CODE 258: Performed by: STUDENT IN AN ORGANIZED HEALTH CARE EDUCATION/TRAINING PROGRAM

## 2022-01-29 PROCEDURE — 700105 HCHG RX REV CODE 258: Performed by: NURSE PRACTITIONER

## 2022-01-29 PROCEDURE — 83735 ASSAY OF MAGNESIUM: CPT

## 2022-01-29 PROCEDURE — 700111 HCHG RX REV CODE 636 W/ 250 OVERRIDE (IP): Performed by: PSYCHIATRY & NEUROLOGY

## 2022-01-29 PROCEDURE — 85730 THROMBOPLASTIN TIME PARTIAL: CPT

## 2022-01-29 PROCEDURE — 03CG3ZZ EXTIRPATION OF MATTER FROM INTRACRANIAL ARTERY, PERCUTANEOUS APPROACH: ICD-10-PCS | Performed by: RADIOLOGY

## 2022-01-29 PROCEDURE — 85610 PROTHROMBIN TIME: CPT

## 2022-01-29 PROCEDURE — 700117 HCHG RX CONTRAST REV CODE 255: Performed by: INTERNAL MEDICINE

## 2022-01-29 PROCEDURE — 700111 HCHG RX REV CODE 636 W/ 250 OVERRIDE (IP)

## 2022-01-29 PROCEDURE — 70551 MRI BRAIN STEM W/O DYE: CPT

## 2022-01-29 PROCEDURE — 93308 TTE F-UP OR LMTD: CPT

## 2022-01-29 PROCEDURE — 770022 HCHG ROOM/CARE - ICU (200)

## 2022-01-29 PROCEDURE — 99291 CRITICAL CARE FIRST HOUR: CPT | Mod: FS | Performed by: NURSE PRACTITIONER

## 2022-01-29 PROCEDURE — 700111 HCHG RX REV CODE 636 W/ 250 OVERRIDE (IP): Performed by: NURSE PRACTITIONER

## 2022-01-29 PROCEDURE — 85520 HEPARIN ASSAY: CPT

## 2022-01-29 PROCEDURE — 700111 HCHG RX REV CODE 636 W/ 250 OVERRIDE (IP): Performed by: RADIOLOGY

## 2022-01-29 RX ORDER — MIDAZOLAM HYDROCHLORIDE 1 MG/ML
INJECTION INTRAMUSCULAR; INTRAVENOUS
Status: COMPLETED
Start: 2022-01-29 | End: 2022-01-29

## 2022-01-29 RX ORDER — SODIUM CHLORIDE 9 MG/ML
500 INJECTION, SOLUTION INTRAVENOUS ONCE
Status: COMPLETED | OUTPATIENT
Start: 2022-01-29 | End: 2022-01-29

## 2022-01-29 RX ORDER — MIDAZOLAM HYDROCHLORIDE 1 MG/ML
.5-2 INJECTION INTRAMUSCULAR; INTRAVENOUS PRN
Status: ACTIVE | OUTPATIENT
Start: 2022-01-29 | End: 2022-01-29

## 2022-01-29 RX ORDER — HEPARIN SODIUM 5000 [USP'U]/100ML
0-30 INJECTION, SOLUTION INTRAVENOUS CONTINUOUS
Status: DISPENSED | OUTPATIENT
Start: 2022-01-29 | End: 2022-02-02

## 2022-01-29 RX ORDER — MIDAZOLAM HYDROCHLORIDE 1 MG/ML
INJECTION INTRAMUSCULAR; INTRAVENOUS
Status: COMPLETED | OUTPATIENT
Start: 2022-01-29 | End: 2022-01-29

## 2022-01-29 RX ORDER — SODIUM CHLORIDE 9 MG/ML
500 INJECTION, SOLUTION INTRAVENOUS
Status: ACTIVE | OUTPATIENT
Start: 2022-01-29 | End: 2022-01-29

## 2022-01-29 RX ORDER — ONDANSETRON 2 MG/ML
4 INJECTION INTRAMUSCULAR; INTRAVENOUS PRN
Status: ACTIVE | OUTPATIENT
Start: 2022-01-29 | End: 2022-01-29

## 2022-01-29 RX ADMIN — MIDAZOLAM HYDROCHLORIDE 0.5 MG: 1 INJECTION, SOLUTION INTRAMUSCULAR; INTRAVENOUS at 10:12

## 2022-01-29 RX ADMIN — FENTANYL CITRATE 25 MCG: 50 INJECTION, SOLUTION INTRAMUSCULAR; INTRAVENOUS at 10:12

## 2022-01-29 RX ADMIN — SODIUM CHLORIDE 500 ML: 9 INJECTION, SOLUTION INTRAVENOUS at 23:03

## 2022-01-29 RX ADMIN — PHENYLEPHRINE HYDROCHLORIDE 50 MCG/MIN: 10 INJECTION INTRAVENOUS at 23:03

## 2022-01-29 RX ADMIN — FENTANYL CITRATE 25 MCG: 50 INJECTION, SOLUTION INTRAMUSCULAR; INTRAVENOUS at 10:30

## 2022-01-29 RX ADMIN — MIDAZOLAM 0.5 MG: 1 INJECTION INTRAMUSCULAR; INTRAVENOUS at 10:12

## 2022-01-29 RX ADMIN — IOHEXOL 95 ML: 300 INJECTION, SOLUTION INTRAVENOUS at 11:45

## 2022-01-29 RX ADMIN — NICARDIPINE HYDROCHLORIDE 5 MG/HR: 25 INJECTION, SOLUTION INTRAVENOUS at 13:10

## 2022-01-29 RX ADMIN — IOHEXOL 100 ML: 350 INJECTION, SOLUTION INTRAVENOUS at 22:30

## 2022-01-29 RX ADMIN — HEPARIN SODIUM 12 UNITS/KG/HR: 5000 INJECTION, SOLUTION INTRAVENOUS at 12:41

## 2022-01-29 RX ADMIN — HUMAN ALBUMIN MICROSPHERES AND PERFLUTREN 3 ML: 10; .22 INJECTION, SOLUTION INTRAVENOUS at 15:10

## 2022-01-29 RX ADMIN — MIDAZOLAM HYDROCHLORIDE 0.5 MG: 1 INJECTION, SOLUTION INTRAMUSCULAR; INTRAVENOUS at 10:30

## 2022-01-29 RX ADMIN — SODIUM PHOSPHATE, MONOBASIC, MONOHYDRATE 15 MMOL: 276; 142 INJECTION, SOLUTION INTRAVENOUS at 11:42

## 2022-01-29 RX ADMIN — MIDAZOLAM HYDROCHLORIDE 1 MG: 1 INJECTION, SOLUTION INTRAMUSCULAR; INTRAVENOUS at 09:39

## 2022-01-29 RX ADMIN — IOHEXOL 80 ML: 350 INJECTION, SOLUTION INTRAVENOUS at 08:38

## 2022-01-29 RX ADMIN — NICARDIPINE HYDROCHLORIDE 5 MG/HR: 25 INJECTION, SOLUTION INTRAVENOUS at 18:34

## 2022-01-29 RX ADMIN — IOHEXOL 40 ML: 350 INJECTION, SOLUTION INTRAVENOUS at 08:32

## 2022-01-29 RX ADMIN — PHENYLEPHRINE HYDROCHLORIDE 50 MCG/MIN: 10 INJECTION INTRAVENOUS at 05:16

## 2022-01-29 ASSESSMENT — FIBROSIS 4 INDEX: FIB4 SCORE: 0.8

## 2022-01-29 ASSESSMENT — PAIN SCALES - WONG BAKER
WONGBAKER_NUMERICALRESPONSE: HURTS A LITTLE MORE
WONGBAKER_NUMERICALRESPONSE: DOESN'T HURT AT ALL
WONGBAKER_NUMERICALRESPONSE: HURTS A LITTLE MORE

## 2022-01-29 NOTE — PROGRESS NOTES
Updated Dr. Rock, neurohospitalist, about patient's increased slurred speech and decreased left hand  strength, these changes seem consistent with changes noted yesterday 1/27. MRI completed this morning sufficient per Dr. Rock.

## 2022-01-29 NOTE — PROGRESS NOTES
"Radiology Progress Note   Author: KASIA Zamora Date & Time created: 1/28/2022  4:35 PM   Date of admission  1/27/2022  Note to reader: this note follows the APSO format rather than the historical SOAP format. Assessment and plan located at the top of the note for ease of use.    Chief Complaint  66 y.o. male admitted 1/27/2022 with   Chief Complaint   Patient presents with   • Possible Stroke     Onset at 1400, R sided gaze deviation & L sided facial droop, L sided arm & leg drift. BG 98 ons scene with REMSA. IV placed en route, no thinners.         HPI  \"66 y.o. male with unknown PMHx who presented 1/27/2022 with L sided weakness and slurred speech; found to have R MCA occlusion s/p IV TPa and thrombectomy. He had witnessed abrupt onset of symptoms at 1400. NIHSS was 16 with features of R MCA syndrome.  Stroke protocol CT did not revealed a large territory stroke or hemorrhage.  CT angiogram with R M2 occlusion.  He received IV-tPA and was taken to cath lab for mechanical thrombectomy x 3 by Tereso resulting in TICI 2 b flow.\"    Assessment/Plan  Interval History   Principal Problem:    Acute ischemic stroke (HCC)  Active Problems:    COVID-19    Type 2 diabetes mellitus with neurologic complication, without long-term current use of insulin (HCC)      Plan IR  - Neuro checks per ICU protocol  - Keep SBP less than 160-120   - Secondary stroke prevention per Neurology recommendations  - Pending CT/MRI   -Thank you for allowing Neuro Interventional Radiology team to participate in the patients care, if any additonal care or requests are needed in the future please do not hesitate call or place IR order      [id number]  IR:            Review of Systems  Physical Exam   Review of Systems   Constitutional: Negative for chills and fever.   HENT: Negative for hearing loss.    Eyes: Negative for blurred vision and double vision.   Respiratory: Positive for cough. Negative for hemoptysis and shortness of breath.  "   Cardiovascular: Negative for chest pain and palpitations.   Gastrointestinal: Negative for abdominal pain and vomiting.   Genitourinary: Negative for dysuria.   Musculoskeletal: Negative for myalgias.   Skin: Negative for rash.   Neurological: Negative for dizziness, sensory change, speech change, focal weakness, weakness and headaches.   Endo/Heme/Allergies: Does not bruise/bleed easily.   Psychiatric/Behavioral: Negative for suicidal ideas.      Vitals:    01/28/22 1600   BP: 141/76   Pulse: 80   Resp: (!) 27   Temp:    SpO2: 90%        Physical Exam  Constitutional:       Appearance: Normal appearance.   HENT:      Head: Normocephalic.      Nose: Nose normal.      Mouth/Throat:      Mouth: Mucous membranes are moist.   Eyes:      Pupils: Pupils are equal, round, and reactive to light.   Cardiovascular:      Rate and Rhythm: Normal rate.   Pulmonary:      Effort: Pulmonary effort is normal. No respiratory distress.   Abdominal:      General: Abdomen is flat.      Tenderness: There is no abdominal tenderness.   Musculoskeletal:         General: No tenderness or deformity.      Cervical back: Normal range of motion.   Skin:     General: Skin is warm and dry.      Capillary Refill: Capillary refill takes less than 2 seconds.      Coloration: Skin is not jaundiced or pale.   Neurological:      Mental Status: He is alert.      Cranial Nerves: Dysarthria present. No facial asymmetry.      Motor: No weakness, tremor or pronator drift.      Coordination: Finger-Nose-Finger Test abnormal.      Comments: Slight slurred speech   Neglect/Inattention left      Psychiatric:         Mood and Affect: Mood normal.         Behavior: Behavior normal.             Labs    Recent Labs     01/27/22  1508 01/28/22  0450   WBC 7.1 8.3   RBC 5.12 4.62*   HEMOGLOBIN 16.4 15.1   HEMATOCRIT 48.3 43.7   MCV 94.3 94.6   MCH 32.0 32.7   MCHC 34.0 34.6   RDW 45.6 46.1   PLATELETCT 404 388   MPV 8.9* 9.0     Recent Labs     01/27/22  1508  01/28/22  0450   SODIUM 138 139   POTASSIUM 3.8 3.9   CHLORIDE 102 106   CO2 23 22   GLUCOSE 115* 106*   BUN 11 11   CREATININE 0.78 0.74   CALCIUM 8.8 8.3*     Recent Labs     01/27/22  1508 01/28/22  0450   ALBUMIN 3.3 3.3   TBILIRUBIN 0.7 0.7   ALKPHOSPHAT 99 89   TOTPROTEIN 7.5 6.5   ALTSGPT 45 31   ASTSGOT 29 23   CREATININE 0.78 0.74     CT-HEAD W/O   Final Result      1. Developing hypodensity in the right temporal occipital region consistent with acute infarct.   2. Tiny foci of hemorrhage in the right insular ribbon and possible tiny foci of subarachnoid hemorrhage or contrast staining in the right parietal and temporo-occipital regions.      These findings were messaged via Voalte with JEREMY M GONDA on 1/28/2022 11:58 AM.            US-EXTREMITY VENOUS LOWER BILAT   Final Result      DX-CHEST-PORTABLE (1 VIEW)   Final Result      Patchy airspace process consistent with edema or atelectasis      CT-CTA NECK WITH & W/O-POST PROCESSING   Final Result   Addendum 1 of 1   Addendum:   There is a small nonocclusive arterial thrombus within the right    innominate artery. This is seen on series 9 image 116.      These findings were discussed with REMY CARRERO by Dr. Rock on    1/27/2022.      Final      1.  No hemodynamically significant stenosis of the neck arteries.   2.  Bilateral ill-defined pulmonary opacities in keeping with Covid pneumonia.      CT-CTA HEAD WITH & W/O-POST PROCESS   Final Result      1.  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            Comment: Results discussed with Dr. Carrero at approximately 3:35 PM      CT-CEREBRAL PERFUSION ANALYSIS   Final Result      1.  Nondiagnostic scan secondary to patient motion.      CT-HEAD W/O   Final Result         NO ACUTE ABNORMALITIES ARE NOTED ON CT SCAN OF THE HEAD.               IR-THROMBO MECHANICAL ARTERY,INIT    (Results Pending)   EC-ECHOCARDIOGRAM COMPLETE W/O CONT    (Results Pending)   MR-BRAIN-W/O     (Results Pending)   MR-BRAIN-W/O    (Results Pending)       INR   Date Value Ref Range Status   01/27/2022 1.10 0.87 - 1.13 Final     Comment:     INR - Non-therapeutic Reference Range: 0.87-1.13  INR - Therapeutic Reference Range: 2.0-4.0       No results found for: POCINR     Intake/Output Summary (Last 24 hours) at 1/28/2022 1635  Last data filed at 1/28/2022 1027  Gross per 24 hour   Intake 830.83 ml   Output 1250 ml   Net -419.17 ml      Labs not explicitly included in this progress note were reviewed by the author. Radiology/imaging not explicitly included in this progress note was reviewed by the author.     I have performed a physical exam and reviewed and updated ROS and Plan today (1/28/2022).     15 minutes in directly providing and coordinating care and extensive data review.  No time overlap and excludes procedures.

## 2022-01-29 NOTE — DISCHARGE PLANNING
MCA stroke s/p tPA, Covid+ 1/27 - physiatry following, pending therapy evals when appropriate. TCC will continue to follow.

## 2022-01-29 NOTE — PROGRESS NOTES
Hospital Medicine Progress Note    Date of admission  1/27/2022    Chief Complaint  66 y.o. male admitted 1/27/2022 with left sided weakness and slurred speech     Hospital Course  Mr. Scott is a 66 year old male with history of traumatic left BKA presented 1/27/22 for acute onset left sided weakness and slurred speech. His symptoms started abruptly at 1400 1/27/21. He was brought in by EMS for a code stroke, NIH on arrival was 16.Imaging revealed a right M2 occulsion. Patient received IV-tPA and underwent endovascular clot retrieval resulting in TICI 2b flow. Patient admitted to ICU. 1/28 hemorrhagic transformation of stroke, strict SBP parameters.       Interval Problem Update  Neuro changes overnight  Right gaze this AM, CTA head with parietal occipital infarction   Pt to IR for thrombectomy   STRICT -140, on nicardipine gtt  Heparin gtt   DC SSI  Echo pending  SLP eval pending    Review of Systems  Review of Systems   Reason unable to perform ROS: limited due to dysarthria.        Vital Signs for last 24 hours   Temp:  [36 °C (96.8 °F)-36.8 °C (98.2 °F)] 36.8 °C (98.2 °F)  Pulse:  [51-90] 85  Resp:  [11-34] 22  BP: (122-175)/(60-89) 154/88  SpO2:  [90 %-100 %] 96 %    Hemodynamic parameters for last 24 hours       Respiratory Information for the last 24 hours       Physical Exam   Physical Exam  Vitals and nursing note reviewed.   HENT:      Head: Normocephalic and atraumatic.      Right Ear: External ear normal.      Left Ear: External ear normal.      Nose: Nose normal. No congestion.      Mouth/Throat:      Mouth: Mucous membranes are moist.      Pharynx: Oropharynx is clear.   Eyes:      General:         Right eye: No discharge.         Left eye: No discharge.      Pupils: Pupils are equal, round, and reactive to light.   Cardiovascular:      Rate and Rhythm: Normal rate and regular rhythm.      Pulses: Normal pulses.      Heart sounds: No murmur heard.      Pulmonary:      Effort: Pulmonary effort  is normal.      Breath sounds: Normal breath sounds.   Abdominal:      General: Bowel sounds are normal. There is no distension.      Palpations: Abdomen is soft.      Tenderness: There is no abdominal tenderness.   Musculoskeletal:         General: No swelling or tenderness.      Cervical back: Neck supple. No tenderness.      Comments: Right BKA   Skin:     General: Skin is warm and dry.      Capillary Refill: Capillary refill takes 2 to 3 seconds.   Neurological:      Mental Status: He is alert.      Cranial Nerves: Dysarthria present.      Comments: Right gaze deviation, right arm drift, dysarthria, incomprehensible speech    Psychiatric:         Attention and Perception: Attention normal.         Mood and Affect: Mood normal.         Medications  Current Facility-Administered Medications   Medication Dose Route Frequency Provider Last Rate Last Admin   • sodium phosphate 15 mmol in dextrose 5% 250 mL ivpb  15 mmol Intravenous Once Eliane Woodson A.P.R.N.   Stopped at 01/29/22 1542   • heparin infusion 25,000 units in 500 mL 0.45% NACL  0-30 Units/kg/hr Intravenous Continuous URMILA Matson.P.R.N. 16.1 mL/hr at 01/29/22 1241 12 Units/kg/hr at 01/29/22 1241   • niCARdipine (CARDENE) 25 mg in  mL Infusion  0-15 mg/hr Intravenous Continuous Camila Araujo A.P.R.N. 50 mL/hr at 01/29/22 1310 5 mg/hr at 01/29/22 1310   • hydrALAZINE (APRESOLINE) injection 10-20 mg  10-20 mg Intravenous Q2HRS PRN Eliane Woodson A.P.R.N.       • labetalol (NORMODYNE/TRANDATE) injection 10 mg  10 mg Intravenous Q10 MIN PRN Eliane Woodson, A.P.R.N.       • senna-docusate (PERICOLACE or SENOKOT S) 8.6-50 MG per tablet 2 Tablet  2 Tablet Oral BID Alec Block M.D.        And   • polyethylene glycol/lytes (MIRALAX) PACKET 1 Packet  1 Packet Oral QDAY PRN Alec Block M.D.        And   • magnesium hydroxide (MILK OF MAGNESIA) suspension 30 mL  30 mL Oral QDAY PRN Alec Block M.D.        And   • bisacodyl  (DULCOLAX) suppository 10 mg  10 mg Rectal QDAY PRN Alec Block M.D.       • acetaminophen (Tylenol) tablet 650 mg  650 mg Oral Q6HRS PRN Alec Block M.D.       • ondansetron (ZOFRAN) syringe/vial injection 4 mg  4 mg Intravenous Q4HRS PRN Alec Block M.D.       • ondansetron (ZOFRAN ODT) dispertab 4 mg  4 mg Oral Q4HRS PRN Alec Block M.D.       • atorvastatin (LIPITOR) tablet 80 mg  80 mg Oral Q EVENING Alec Block M.D.           Fluids    Intake/Output Summary (Last 24 hours) at 1/29/2022 1511  Last data filed at 1/29/2022 1142  Gross per 24 hour   Intake 633.79 ml   Output 550 ml   Net 83.79 ml       Laboratory          Recent Labs     01/27/22  1508 01/28/22  0450 01/29/22 0220   SODIUM 138 139 137   POTASSIUM 3.8 3.9 4.3   CHLORIDE 102 106 105   CO2 23 22 20   BUN 11 11 11   CREATININE 0.78 0.74 0.72   MAGNESIUM  --  2.1 2.2   PHOSPHORUS  --  3.0 2.1*   CALCIUM 8.8 8.3* 8.5     Recent Labs     01/27/22  1508 01/28/22  0450 01/29/22  0220   ALTSGPT 45 31 30   ASTSGOT 29 23 24   ALKPHOSPHAT 99 89 100*   TBILIRUBIN 0.7 0.7 0.9   GLUCOSE 115* 106* 92     Recent Labs     01/27/22  1508 01/28/22  0450 01/29/22  0220   WBC 7.1 8.3 9.9   NEUTSPOLYS 67.40  --   --    LYMPHOCYTES 22.20  --   --    MONOCYTES 9.70  --   --    EOSINOPHILS 0.30  --   --    BASOPHILS 0.10  --   --    ASTSGOT 29 23 24   ALTSGPT 45 31 30   ALKPHOSPHAT 99 89 100*   TBILIRUBIN 0.7 0.7 0.9     Recent Labs     01/27/22  1508 01/28/22  0450 01/29/22  0220 01/29/22  1158   RBC 5.12 4.62* 4.69*  --    HEMOGLOBIN 16.4 15.1 15.2  --    HEMATOCRIT 48.3 43.7 44.3  --    PLATELETCT 404 388 358  --    PROTHROMBTM 13.9  --   --  15.1*   APTT 42.1*  --   --  40.5*   INR 1.10  --   --  1.23*       Imaging  CT:    Reviewed    Assessment/Plan  * Acute ischemic stroke (HCC)- (present on admission)  Assessment & Plan  1/27 R M2 occlusion s/p IV-tPA and thrombectomy, TICI 2b  1/28 hemorrhagic transformation  1/29 occipital parietal  occulsion, thrombectomy completed  Heparin gtt per neuro  Nicardipine for -140  Maintain euglycemic and euthermic   Stat CT head if neuro changes  Echo pending  A1c 6.6  , started on atorvastatin 80mg  Q1 hour neuro checks  SLP/PT/OT    Type 2 diabetes mellitus with neurologic complication, without long-term current use of insulin (HCC)- (present on admission)  Assessment & Plan  Presenting with ischemic stroke, now with hemorrhagic transformation  A1c 6.6  Continue SSI  Carbohydrate consistent diet   Consider oral antidiabetics on discharge    COVID-19  Assessment & Plan  Contributing to hypercoagulability and CVA   Patient denies respiratory symptoms, on room air  DVT study negative   Continue enhanced droplet isolation          VTE:  Heparin  Ulcer: Not Indicated  Lines: Arterial Line  Ongoing indication addressed and Hussein Catheter  Ongoing indication addressed    I have performed a physical exam and reviewed and updated ROS and Plan today (1/29/2022). In review of yesterday's note (1/28/2022), there are no changes except as documented above.     Discussed patient condition and risk of morbidity and/or mortality with Family, RN, Charge nurse / hot rounds, Patient and neurology  The patient remains critically ill.  Critical care time = 75 minutes in directly providing and coordinating critical care and extensive data review.  No time overlap and excludes procedures.

## 2022-01-29 NOTE — PROGRESS NOTES
Patient brought to IR 3 for cerebral angiogram with possible intervention and central line placement. Patient able to follow directions, but verbally not responsive. Patient safely transferred to procedure table and connected to cardiac and respiratory monitoring. Hussein catheter placed. Patient prepped and draped in sterile fashion.     Cererbral angiogram with mechanical thrombectomy and central line placed by Dr Sanchez assisted by RT Florez, right femofal access site; Pre-proceudre pedal pulses left 3+, right BKA, penumbra system was used to retreive clot.     Procedure start: 1004  R IJ Triple lumen CVC: 1008  Access right femoral: 1011  Angioseal deployed at 1047  Procedure end: 1048  TICI score 2B    Patient tolerated procedure, Vital signs were taken every 5 minutes and remained within parameters (see doc flow sheets); hemostasis achieved using Angioseal;  report given to SEBAS Hartman;    patient transported to ICU with RN and monitor     Post procedure NIH not performed due to sedation. Post procedure pedal pulses right BKA, left 3+    Arrow Multi-lumen Central Venous Catheter  7Fr x 20cm Triple lumen  Ref: [id number]  Lot: [id number]  Exp: 09/30/2022     Angio-Seal VIP 8F  Ref: [id number]  Lot: [id number]  Exp: 10/31/2022

## 2022-01-29 NOTE — PROGRESS NOTES
Neurology note    Called by bedside due to neuro changes.  Patient with worsening dysarthria and L face droop.  On initial exam- he was fairly dysarthric, but intelligible, and significant L face droop.  These symptoms were much worse, and have been fluctuating throughout course of day.  When laying flat in bed with , symptoms improved.  A repeat head CT earlier in day revealed evolving modest burden of infarct in R MCA territory with minor hemorrhagic conversion.    I suspect that he is evolving a perfusion dependent exam given his residual occlusions.  I recommend mild permissive HTN, however given presence of hemorrhage, cannot be overly aggressive in pressure augmentation.     Recommendations as follows:   - HOB flat to 15 degrees, strict bedrest   - SBP goal 130-160 STRICT.  I have placed order for phenylephrine PRN for pressure augmentation if needed   - remain NPO, repeat SLP evaluation in AM    Alec Arechiga MD  Neurology

## 2022-01-29 NOTE — PROGRESS NOTES
Neurology Progress Note  Neurohospitalist Service, [facility]    Referring Physician: Jeremy M Gonda, M.D.      Interval History: Re-development of R gaze deviation, severe dysarthria, and speech paucity, mild L side weakness.  Exam clearly worse.  No improvement today with SBPs in 150s.  Repeat CTA/P with recurrent R M2 occlusion.     Review of systems: In addition to what is detailed in the HPI and/or updated in the interval history, all other systems reviewed and are negative.    Past Medical History, Past Surgical History and Social History reviewed and unchanged from prior    Medications:    Current Facility-Administered Medications:   •  hydrALAZINE (APRESOLINE) injection 10-20 mg, 10-20 mg, Intravenous, Q2HRS PRN, Eliane Woodson, A.P.R.N.  •  labetalol (NORMODYNE/TRANDATE) injection 10 mg, 10 mg, Intravenous, Q10 MIN PRN, Eliane Woodson, A.P.R.N.  •  phenylephrine (TAM-SYNEPHRINE) 40 mg in  mL infusion, 0-300 mcg/min, Intravenous, Continuous, Alec Arechiga M.D., Last Rate: 11.3 mL/hr at 01/29/22 0630, 30 mcg/min at 01/29/22 0630  •  senna-docusate (PERICOLACE or SENOKOT S) 8.6-50 MG per tablet 2 Tablet, 2 Tablet, Oral, BID **AND** polyethylene glycol/lytes (MIRALAX) PACKET 1 Packet, 1 Packet, Oral, QDAY PRN **AND** magnesium hydroxide (MILK OF MAGNESIA) suspension 30 mL, 30 mL, Oral, QDAY PRN **AND** bisacodyl (DULCOLAX) suppository 10 mg, 10 mg, Rectal, QDAY PRN, Alec Block M.D.  •  acetaminophen (Tylenol) tablet 650 mg, 650 mg, Oral, Q6HRS PRN, Alec Block M.D.  •  ondansetron (ZOFRAN) syringe/vial injection 4 mg, 4 mg, Intravenous, Q4HRS PRN, Alec Block M.D.  •  ondansetron (ZOFRAN ODT) dispertab 4 mg, 4 mg, Oral, Q4HRS PRN, Alec Block M.D.  •  insulin regular (HumuLIN R,NovoLIN R) injection, 1-6 Units, Subcutaneous, Q6HRS **AND** POC blood glucose manual result, , , Q6H **AND** NOTIFY MD and PharmD, , , Once **AND** Administer 20 grams of glucose  (approximately 8 ounces of fruit juice) every 15 minutes PRN FSBG less than 70 mg/dL, , , PRN **AND** dextrose 50% (D50W) injection 50 mL, 50 mL, Intravenous, Q15 MIN PRN, Alec Block M.D.  •  atorvastatin (LIPITOR) tablet 80 mg, 80 mg, Oral, Q EVENING, Alec Block M.D.    Physical Examination:   BP (!) 175/74   Pulse (!) 51   Temp 36 °C (96.8 °F) (Temporal)   Resp 19   Wt 67 kg (147 lb 11.3 oz)   SpO2 94%   BMI 23.84 kg/m²       General: Patient is awake and in no acute distress  Neck: There is normal range of motion  CV: Regular rate   Extremities:  Warm, dry, and intact, without peripheral lower extremity edema    NEUROLOGICAL EXAM:     Mental status: Awake, interactive  Speech and language: Speech with severe dysarthria, speech paucity.   Cranial nerve exam: Decreased blink to threat from L.  There is a L face droop.  Forced R gaze deviation.  Motor exam: There is sustained antigravity with no downward drift in R arm and leg.  L leg is sustained antigravity.  L arm with slight drift  Sensory exam:  Dense sensory loss on L.  Appears to neglect L side  Coordination: No ataxia on spontaneous movements         NIHSS: National Institutes [employer] Stroke Scale    [0] 1a:Level of Consciousness    0-alert 1-drowsy   2-stupor   3-coma  [2] 1b:LOC Questions                  0-both  1-one      2-neither  [0] 1c:LOC Commands                   0-both  1-one      2-neither  [2] 2: Best Gaze                     0-nl    1-partial  2-forced  [2] 3: Visual Fields                   0-nl    1-partial  2-complete 3-bilat  [2] 4: Facial Paresis                0-nl    1-minor    2-partial  3-full  MOTOR                       0-nl  [0] 5: Right Arm           1-drift  [1] 6: Left Arm             2-some effort vs gravity  [0] 7: Right Leg           3-no effort vs gravity  [0] 8: Left Leg             4-no movement                             x-untestable  [0] 9: Limb Ataxia                    0-abs   1-1_limb    2-2+_limbs       x-untestable  [2] 10:Sensory                        0-nl    1-partial  2-dense  [1] 11:Best Language/Aphasia         0-nl    1-mild/mod 2-severe   3-mute  [2] 12:Dysarthria                     0-nl    1-mild/mod 2-severe       x-untestable  [1] 13:Neglect/Inattention            0-none  1-partial  2-complete  [14] TOTAL      Objective Data:    Labs:  Lab Results   Component Value Date/Time    PROTHROMBTM 13.9 01/27/2022 03:08 PM    INR 1.10 01/27/2022 03:08 PM      Lab Results   Component Value Date/Time    WBC 9.9 01/29/2022 02:20 AM    RBC 4.69 (L) 01/29/2022 02:20 AM    HEMOGLOBIN 15.2 01/29/2022 02:20 AM    HEMATOCRIT 44.3 01/29/2022 02:20 AM    MCV 94.5 01/29/2022 02:20 AM    MCH 32.4 01/29/2022 02:20 AM    MCHC 34.3 01/29/2022 02:20 AM    MPV 9.1 01/29/2022 02:20 AM    NEUTSPOLYS 67.40 01/27/2022 03:08 PM    LYMPHOCYTES 22.20 01/27/2022 03:08 PM    MONOCYTES 9.70 01/27/2022 03:08 PM    EOSINOPHILS 0.30 01/27/2022 03:08 PM    BASOPHILS 0.10 01/27/2022 03:08 PM      Lab Results   Component Value Date/Time    SODIUM 137 01/29/2022 02:20 AM    POTASSIUM 4.3 01/29/2022 02:20 AM    CHLORIDE 105 01/29/2022 02:20 AM    CO2 20 01/29/2022 02:20 AM    GLUCOSE 92 01/29/2022 02:20 AM    BUN 11 01/29/2022 02:20 AM    CREATININE 0.72 01/29/2022 02:20 AM      Lab Results   Component Value Date/Time    CHOLSTRLTOT 183 01/28/2022 04:50 AM     (H) 01/28/2022 04:50 AM    HDL 23 (A) 01/28/2022 04:50 AM    TRIGLYCERIDE 128 01/28/2022 04:50 AM       Lab Results   Component Value Date/Time    ALKPHOSPHAT 100 (H) 01/29/2022 02:20 AM    ASTSGOT 24 01/29/2022 02:20 AM    ALTSGPT 30 01/29/2022 02:20 AM    TBILIRUBIN 0.9 01/29/2022 02:20 AM        Imaging/Testing:    I interpreted and/or reviewed the patient's neuroimaging    CT-CEREBRAL PERFUSION ANALYSIS   Final Result      1.  Cerebral blood flow less than 30% likely representing completed infarct = 62 mL.      2.  T Max more than 6 seconds likely representing  combination of completed infarct and ischemia = 76 mL.      3.  Mismatched volume likely representing ischemic brain/penumbra = 14 mL      4.  Please note that the cerebral perfusion was performed on the limited brain tissue around the basal ganglia region. Infarct/ischemia outside the CT perfusion sections can be missed in this study.      CT-CTA HEAD WITH & W/O-POST PROCESS   Preliminary Result      Subacute right parieto-occipital infarction without hemorrhagic transformation. The MCA branch vessel supplying this again show contrast cut off      Left thalamic vasogenic edema and subacute hemorrhage exerts mild mass effect, slightly worse than on comparison      New eccentric nonocclusive filling defects in the distal right internal carotid and M1 segments concerning for small thrombus favored over spasm      Similar distal right vertebral artery segmental narrowing may be from spasm or chronic. No focal occlusion and the left vertebral artery is dominant with normal vertebrobasilar confluence      MR-BRAIN-W/O   Final Result      1.  Multifocal areas of acute infarcts in the right frontal, temporal and parietal lobes. . The gradient echo images demonstrates linear area of magnetic susceptibility artifact at the insular cortex likely representing small area hemorrhagic    transformation or thrombus within the vessels.There are petechial microhemorrhages in the right medial posterior temporal lobe.   2.  There is an area of subacute hemorrhage with surrounding white matter edema in the left thalamus. This finding is noted on the previous CT scan dated 1/27/2022.   3.  Mild cerebral volume loss.   4.  Mild chronic microvascular ischemic disease.      CT-HEAD W/O   Final Result      1. Developing hypodensity in the right temporal occipital region consistent with acute infarct.   2. Tiny foci of hemorrhage in the right insular ribbon and possible tiny foci of subarachnoid hemorrhage or contrast staining in the right  parietal and temporo-occipital regions.      These findings were messaged via Voalte with JEREMY M GONDA on 1/28/2022 11:58 AM.            US-EXTREMITY VENOUS LOWER BILAT   Final Result      DX-CHEST-PORTABLE (1 VIEW)   Final Result      Patchy airspace process consistent with edema or atelectasis      CT-CTA NECK WITH & W/O-POST PROCESSING   Final Result   Addendum 1 of 1   Addendum:   There is a small nonocclusive arterial thrombus within the right    innominate artery. This is seen on series 9 image 116.      These findings were discussed with REMY CARRERO by Dr. Rock on    1/27/2022.      Final      1.  No hemodynamically significant stenosis of the neck arteries.   2.  Bilateral ill-defined pulmonary opacities in keeping with Covid pneumonia.      CT-CTA HEAD WITH & W/O-POST PROCESS   Final Result      1.  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            Comment: Results discussed with Dr. Carrero at approximately 3:35 PM      CT-CEREBRAL PERFUSION ANALYSIS   Final Result      1.  Nondiagnostic scan secondary to patient motion.      CT-HEAD W/O   Final Result         NO ACUTE ABNORMALITIES ARE NOTED ON CT SCAN OF THE HEAD.               IR-THROMBO MECHANICAL ARTERY,INIT    (Results Pending)   EC-ECHOCARDIOGRAM COMPLETE W/O CONT    (Results Pending)   IR-THROMBO MECHANICAL ARTERY,INIT    (Results Pending)       Assessment and Plan:  Geoff Scott is a 66 year old man presenting with R MCA syndrome, found to have a R M2 occlusion, now s/p IV-tPA and successful TICI 2b mechanical thrombectomy.   His clinical exam was initially much improved. Overnight with development of a near-full R MCA syndrome, and CTA/P this morning with recurrence of R M2 occlusion.  Discussed with Neuro-IR and plan for takeback to cath lab for endovascular clot retrieval.  He is COVID positive, and recurrent thrombosis related to COVID hypercoagulability.  Will need to expeditiously start  anticoagulation post-operatively.    Problem list:  1.  R MCA stroke   2.  S/p IV-tPA and mechanical thrombectomy  3.  COVID positive    Plan:   - neurochecks/NIHSS per post-thrombectomy protocol   - SBP goal pending reperfusion results   - anticipate starting no-bolus heparin infusion post-operatively   - stroke labs:  HgbA1c and    - continue atorvastatin 80mg daily for goal LDL < 70   - TTE and long-term cardiac monitoring at discharge   - PT/OT/SLP    Addendum: TICI 2b, R P2 clot retrieval.  Given recurrent thrombosis, will initiate heparin infusion, no bolus protocol.  Maintain SBP goal 110-140 given large infarct and use of heparin, and limited if any ischemic penumbra at this time.  Recommendations relayed to ICU team and family updated.      The evaluation of the patient, and recommended management, was discussed with the ICU staff. I have performed a physical exam and reviewed and updated ROS and Plan today (1/29/2022).     Alec Arechiga MD  Neurohospitalist, Acute Care Services

## 2022-01-29 NOTE — CARE PLAN
The patient is Watcher - Medium risk of patient condition declining or worsening    Shift Goals  Clinical Goals: Neuro check stability, MRI  Patient Goals: Water, rest  Family Goals: Comfort, to get better    Progress made toward(s) clinical / shift goals:  Patient education reinforced about continuing to use left side of body for tasks to practice coordination. MRI obtained.       Problem: Optimal Care of the Stroke Patient  Goal: Optimal acute care for the stroke patient  Outcome: Progressing     Problem: Knowledge Deficit - Stroke Education  Goal: Patient's knowledge of stroke and risk factors will improve  Outcome: Progressing     Problem: Self Care  Goal: Patient will have the ability to perform ADLs independently or with assistance (bathe, groom, dress, toilet and feed)  Outcome: Progressing     Problem: Skin Integrity  Goal: Skin integrity is maintained or improved  Outcome: Progressing     Problem: Fall Risk  Goal: Patient will remain free from falls  Outcome: Progressing

## 2022-01-29 NOTE — OR SURGEON
Immediate Post- Operative Note        Findings: M2 occlusion      Procedure(s):Mechanical thrombectomy     TICI 2b       Estimated Blood Loss: Less than 5 ml        Complications: None            1/29/2022     11:02 AM     Genaro Sanchez M.D.

## 2022-01-30 LAB
ANION GAP SERPL CALC-SCNC: 11 MMOL/L (ref 7–16)
BUN SERPL-MCNC: 11 MG/DL (ref 8–22)
CALCIUM SERPL-MCNC: 8.1 MG/DL (ref 8.5–10.5)
CHLORIDE SERPL-SCNC: 104 MMOL/L (ref 96–112)
CO2 SERPL-SCNC: 21 MMOL/L (ref 20–33)
CREAT SERPL-MCNC: 0.7 MG/DL (ref 0.5–1.4)
ERYTHROCYTE [DISTWIDTH] IN BLOOD BY AUTOMATED COUNT: 45.3 FL (ref 35.9–50)
GLUCOSE SERPL-MCNC: 91 MG/DL (ref 65–99)
HCT VFR BLD AUTO: 41 % (ref 42–52)
HGB BLD-MCNC: 13.8 G/DL (ref 14–18)
LV EJECT FRACT  99904: 70
LV EJECT FRACT MOD 2C 99903: 78.2
LV EJECT FRACT MOD 4C 99902: 72.45
LV EJECT FRACT MOD BP 99901: 72.84
MAGNESIUM SERPL-MCNC: 2.1 MG/DL (ref 1.5–2.5)
MCH RBC QN AUTO: 32 PG (ref 27–33)
MCHC RBC AUTO-ENTMCNC: 33.7 G/DL (ref 33.7–35.3)
MCV RBC AUTO: 95.1 FL (ref 81.4–97.8)
PHOSPHATE SERPL-MCNC: 2.4 MG/DL (ref 2.5–4.5)
PLATELET # BLD AUTO: 374 K/UL (ref 164–446)
PMV BLD AUTO: 9.2 FL (ref 9–12.9)
POTASSIUM SERPL-SCNC: 4 MMOL/L (ref 3.6–5.5)
PROCALCITONIN SERPL-MCNC: 0.04 NG/ML
RBC # BLD AUTO: 4.31 M/UL (ref 4.7–6.1)
SODIUM SERPL-SCNC: 136 MMOL/L (ref 135–145)
UFH PPP CHRO-ACNC: 0.13 IU/ML
UFH PPP CHRO-ACNC: 0.53 IU/ML
UFH PPP CHRO-ACNC: 0.57 IU/ML
WBC # BLD AUTO: 11.9 K/UL (ref 4.8–10.8)

## 2022-01-30 PROCEDURE — 99233 SBSQ HOSP IP/OBS HIGH 50: CPT | Performed by: PSYCHIATRY & NEUROLOGY

## 2022-01-30 PROCEDURE — 93308 TTE F-UP OR LMTD: CPT | Mod: 26 | Performed by: INTERNAL MEDICINE

## 2022-01-30 PROCEDURE — 85520 HEPARIN ASSAY: CPT

## 2022-01-30 PROCEDURE — 92526 ORAL FUNCTION THERAPY: CPT

## 2022-01-30 PROCEDURE — 84100 ASSAY OF PHOSPHORUS: CPT

## 2022-01-30 PROCEDURE — 700111 HCHG RX REV CODE 636 W/ 250 OVERRIDE (IP): Performed by: NURSE PRACTITIONER

## 2022-01-30 PROCEDURE — 83735 ASSAY OF MAGNESIUM: CPT

## 2022-01-30 PROCEDURE — 770022 HCHG ROOM/CARE - ICU (200)

## 2022-01-30 PROCEDURE — 71045 X-RAY EXAM CHEST 1 VIEW: CPT

## 2022-01-30 PROCEDURE — 700102 HCHG RX REV CODE 250 W/ 637 OVERRIDE(OP): Performed by: NURSE PRACTITIONER

## 2022-01-30 PROCEDURE — 85027 COMPLETE CBC AUTOMATED: CPT

## 2022-01-30 PROCEDURE — 80048 BASIC METABOLIC PNL TOTAL CA: CPT

## 2022-01-30 PROCEDURE — A9270 NON-COVERED ITEM OR SERVICE: HCPCS | Performed by: NURSE PRACTITIONER

## 2022-01-30 PROCEDURE — 99291 CRITICAL CARE FIRST HOUR: CPT | Mod: FS | Performed by: NURSE PRACTITIONER

## 2022-01-30 RX ORDER — ACETAMINOPHEN 325 MG/1
650 TABLET ORAL EVERY 6 HOURS PRN
Status: DISCONTINUED | OUTPATIENT
Start: 2022-01-30 | End: 2022-02-10 | Stop reason: HOSPADM

## 2022-01-30 RX ORDER — ONDANSETRON 4 MG/1
4 TABLET, ORALLY DISINTEGRATING ORAL EVERY 4 HOURS PRN
Status: DISCONTINUED | OUTPATIENT
Start: 2022-01-30 | End: 2022-02-10 | Stop reason: HOSPADM

## 2022-01-30 RX ORDER — POLYETHYLENE GLYCOL 3350 17 G/17G
1 POWDER, FOR SOLUTION ORAL
Status: DISCONTINUED | OUTPATIENT
Start: 2022-01-30 | End: 2022-01-30

## 2022-01-30 RX ORDER — ACETAMINOPHEN 325 MG/1
650 TABLET ORAL EVERY 6 HOURS PRN
Status: DISCONTINUED | OUTPATIENT
Start: 2022-01-30 | End: 2022-01-30

## 2022-01-30 RX ORDER — AMOXICILLIN 250 MG
2 CAPSULE ORAL 2 TIMES DAILY
Status: DISCONTINUED | OUTPATIENT
Start: 2022-01-30 | End: 2022-02-10 | Stop reason: HOSPADM

## 2022-01-30 RX ORDER — ATORVASTATIN CALCIUM 40 MG/1
80 TABLET, FILM COATED ORAL EVERY EVENING
Status: DISCONTINUED | OUTPATIENT
Start: 2022-01-30 | End: 2022-01-30

## 2022-01-30 RX ORDER — ONDANSETRON 4 MG/1
4 TABLET, ORALLY DISINTEGRATING ORAL EVERY 4 HOURS PRN
Status: DISCONTINUED | OUTPATIENT
Start: 2022-01-30 | End: 2022-01-30

## 2022-01-30 RX ORDER — BISACODYL 10 MG
10 SUPPOSITORY, RECTAL RECTAL
Status: DISCONTINUED | OUTPATIENT
Start: 2022-01-30 | End: 2022-01-30

## 2022-01-30 RX ORDER — AMOXICILLIN 250 MG
2 CAPSULE ORAL 2 TIMES DAILY
Status: DISCONTINUED | OUTPATIENT
Start: 2022-01-30 | End: 2022-01-30

## 2022-01-30 RX ORDER — AMLODIPINE BESYLATE 5 MG/1
5 TABLET ORAL
Status: DISCONTINUED | OUTPATIENT
Start: 2022-01-30 | End: 2022-02-10 | Stop reason: HOSPADM

## 2022-01-30 RX ORDER — BISACODYL 10 MG
10 SUPPOSITORY, RECTAL RECTAL
Status: DISCONTINUED | OUTPATIENT
Start: 2022-01-30 | End: 2022-02-10 | Stop reason: HOSPADM

## 2022-01-30 RX ORDER — ATORVASTATIN CALCIUM 80 MG/1
80 TABLET, FILM COATED ORAL EVERY EVENING
Status: DISCONTINUED | OUTPATIENT
Start: 2022-01-30 | End: 2022-02-10 | Stop reason: HOSPADM

## 2022-01-30 RX ORDER — POLYETHYLENE GLYCOL 3350 17 G/17G
1 POWDER, FOR SOLUTION ORAL
Status: DISCONTINUED | OUTPATIENT
Start: 2022-01-30 | End: 2022-02-10 | Stop reason: HOSPADM

## 2022-01-30 RX ADMIN — AMLODIPINE BESYLATE 5 MG: 5 TABLET ORAL at 14:07

## 2022-01-30 RX ADMIN — HEPARIN SODIUM 20 UNITS/KG/HR: 5000 INJECTION, SOLUTION INTRAVENOUS at 11:56

## 2022-01-30 RX ADMIN — DOCUSATE SODIUM 50 MG AND SENNOSIDES 8.6 MG 2 TABLET: 8.6; 5 TABLET, FILM COATED ORAL at 17:17

## 2022-01-30 RX ADMIN — ATORVASTATIN CALCIUM 80 MG: 40 TABLET, FILM COATED ORAL at 17:17

## 2022-01-30 ASSESSMENT — ENCOUNTER SYMPTOMS
SPUTUM PRODUCTION: 1
FOCAL WEAKNESS: 1
CHILLS: 0
BLURRED VISION: 0
ABDOMINAL PAIN: 0
PALPITATIONS: 0
FEVER: 0
DOUBLE VISION: 0
SORE THROAT: 0
BACK PAIN: 0
VOMITING: 0
SPEECH CHANGE: 1
COUGH: 1

## 2022-01-30 NOTE — PROGRESS NOTES
Neuro contacted for worsening neurologic symptoms. Patient went for repeat thrombectomy this AM TICI 2b. Per RN, patient developed worsening slurred speech, weak  strength and LUE drift, which worsened from her initial evaluation this shift.    STAT CTA and CTH ordered, which did not reveal any new occlusion of the MCA.    Recommend continued pressure support, as BP was in the 90's.  Please contact neuro if any further decline in patient condition.

## 2022-01-30 NOTE — PROGRESS NOTES
Hospital Medicine Progress Note    Date of admission  1/27/2022    Chief Complaint  66 y.o. male admitted 1/27/2022 with left sided weakness and slurred speech    Hospital Course  Mr. Scott is a 66 year old male with history of traumatic left BKA presented 1/27/22 for acute onset left sided weakness and slurred speech. His symptoms started abruptly at 1400 1/27/21. He was brought in by EMS for a code stroke, NIH on arrival was 16.Imaging revealed a right M2 occulsion. Patient received IV-tPA and underwent endovascular clot retrieval resulting in TICI 2b flow. Patient admitted to ICU.   1/28 hemorrhagic transformation of stroke, strict SBP parameters.   1/29 right gaze deviation, worsening dysarthria, pt back to IR for thrombectomy       Interval Problem Update  Neuro changes overnight, CTA and CT head completed  Labile BPs overnight, nicardipine and princess off this AM  2L NC   Strict -140  Passed SLP eval  PT/OT  D/C art line  D/C lance today  1.8L UOP    Review of Systems  Review of Systems   Constitutional: Negative for chills and fever.   HENT: Negative for congestion and sore throat.    Eyes: Negative for blurred vision and double vision.   Respiratory: Positive for cough and sputum production.    Cardiovascular: Negative for chest pain and palpitations.   Gastrointestinal: Negative for abdominal pain and vomiting.   Musculoskeletal: Negative for back pain and joint pain.   Skin: Negative.    Neurological: Positive for speech change and focal weakness.        Vital Signs for last 24 hours   Temp:  [36.9 °C (98.4 °F)-37.1 °C (98.7 °F)] 36.9 °C (98.5 °F)  Pulse:  [51-94] 81  Resp:  [] 25  BP: ()/(58-84) 134/74  SpO2:  [90 %-99 %] 93 %    Hemodynamic parameters for last 24 hours       Respiratory Information for the last 24 hours       Physical Exam   Physical Exam  Vitals and nursing note reviewed.   Constitutional:       Appearance: He is normal weight.   HENT:      Head: Normocephalic and  atraumatic.      Right Ear: External ear normal.      Left Ear: External ear normal.      Nose: Nose normal.      Mouth/Throat:      Mouth: Mucous membranes are moist.      Pharynx: Oropharynx is clear.   Eyes:      General:         Right eye: No discharge.         Left eye: No discharge.      Comments: Right eye deviation    Cardiovascular:      Rate and Rhythm: Normal rate and regular rhythm.      Pulses: Normal pulses.      Heart sounds: Normal heart sounds. No murmur heard.      Pulmonary:      Effort: Pulmonary effort is normal.      Breath sounds: Decreased breath sounds present.   Abdominal:      General: Abdomen is flat. Bowel sounds are normal.   Musculoskeletal:      Cervical back: Neck supple. No tenderness.      Right lower leg: No edema.      Left lower leg: No edema.      Comments: Right BKA   Skin:     General: Skin is warm and dry.      Capillary Refill: Capillary refill takes 2 to 3 seconds.   Neurological:      Mental Status: He is alert.      Cranial Nerves: Dysarthria and facial asymmetry present.      Motor: Weakness present.      Comments: Disoriented to time, 3/5 LUE strength, left arm drift,  Right gaze preference         Medications  Current Facility-Administered Medications   Medication Dose Route Frequency Provider Last Rate Last Admin   • acetaminophen (Tylenol) tablet 650 mg  650 mg Oral Q6HRS PRN Camila Araujo, A.P.R.N.       • atorvastatin (LIPITOR) tablet 80 mg  80 mg Oral Q EVENING Camila Araujo, A.P.R.N.       • ondansetron (ZOFRAN ODT) dispertab 4 mg  4 mg Oral Q4HRS PRN Camila Araujo, A.P.R.N.       • senna-docusate (PERICOLACE or SENOKOT S) 8.6-50 MG per tablet 2 Tablet  2 Tablet Oral BID Camila Araujo, A.P.R.N.        And   • polyethylene glycol/lytes (MIRALAX) PACKET 1 Packet  1 Packet Oral QDAY PRN Camila Araujo, A.P.R.N.        And   • magnesium hydroxide (MILK OF MAGNESIA) suspension 30 mL  30 mL Oral QDAY PRN Camila Araujo, A.P.R.N.        And   •  bisacodyl (DULCOLAX) suppository 10 mg  10 mg Rectal QDAY PRN Camila Araujo, A.P.R.N.       • amLODIPine (NORVASC) tablet 5 mg  5 mg Oral Q DAY URMILA Matson.P.R.N.   5 mg at 01/30/22 1407   • heparin infusion 25,000 units in 500 mL 0.45% NACL  0-30 Units/kg/hr Intravenous Continuous URMILA Matson.P.R.N. 26.8 mL/hr at 01/30/22 1156 20 Units/kg/hr at 01/30/22 1156   • hydrALAZINE (APRESOLINE) injection 10-20 mg  10-20 mg Intravenous Q2HRS PRN BOZENA RossiP.R.N.       • labetalol (NORMODYNE/TRANDATE) injection 10 mg  10 mg Intravenous Q10 MIN PRN Eliane Woodson A.P.R.N.       • ondansetron (ZOFRAN) syringe/vial injection 4 mg  4 mg Intravenous Q4HRS PRN Alec Block M.D.           Fluids    Intake/Output Summary (Last 24 hours) at 1/30/2022 1419  Last data filed at 1/30/2022 1200  Gross per 24 hour   Intake 1499.18 ml   Output 1825 ml   Net -325.82 ml       Laboratory          Recent Labs     01/28/22  0450 01/29/22  0220 01/30/22  0200   SODIUM 139 137 136   POTASSIUM 3.9 4.3 4.0   CHLORIDE 106 105 104   CO2 22 20 21   BUN 11 11 11   CREATININE 0.74 0.72 0.70   MAGNESIUM 2.1 2.2 2.1   PHOSPHORUS 3.0 2.1* 2.4*   CALCIUM 8.3* 8.5 8.1*     Recent Labs     01/27/22  1508 01/27/22  1508 01/28/22  0450 01/29/22  0220 01/30/22  0200   ALTSGPT 45  --  31 30  --    ASTSGOT 29  --  23 24  --    ALKPHOSPHAT 99  --  89 100*  --    TBILIRUBIN 0.7  --  0.7 0.9  --    GLUCOSE 115*   < > 106* 92 91    < > = values in this interval not displayed.     Recent Labs     01/27/22  1508 01/27/22  1508 01/28/22  0450 01/29/22  0220 01/30/22  0200   WBC 7.1   < > 8.3 9.9 11.9*   NEUTSPOLYS 67.40  --   --   --   --    LYMPHOCYTES 22.20  --   --   --   --    MONOCYTES 9.70  --   --   --   --    EOSINOPHILS 0.30  --   --   --   --    BASOPHILS 0.10  --   --   --   --    ASTSGOT 29  --  23 24  --    ALTSGPT 45  --  31 30  --    ALKPHOSPHAT 99  --  89 100*  --    TBILIRUBIN 0.7  --  0.7 0.9  --     < > = values in this  interval not displayed.     Recent Labs     01/27/22  1508 01/27/22  1508 01/28/22  0450 01/29/22  0220 01/29/22  1158 01/30/22  0200   RBC 5.12   < > 4.62* 4.69*  --  4.31*   HEMOGLOBIN 16.4   < > 15.1 15.2  --  13.8*   HEMATOCRIT 48.3   < > 43.7 44.3  --  41.0*   PLATELETCT 404   < > 388 358  --  374   PROTHROMBTM 13.9  --   --   --  15.1*  --    APTT 42.1*  --   --   --  40.5*  --    INR 1.10  --   --   --  1.23*  --     < > = values in this interval not displayed.       Imaging  X-Ray:  I have personally reviewed the images and compared with prior images. and My impression is: bilateral patchy infiltrates right greater than left     Assessment/Plan  * Acute ischemic stroke (HCC)- (present on admission)  Assessment & Plan  1/27 R M2 occlusion s/p IV-tPA and thrombectomy, TICI 2b  1/28 hemorrhagic transformation  1/29 occipital parietal occulsion, thrombectomy completed  Continue heparin gtt  Maintain -140  Maintain euglycemic and euthermic   Stat CT head if neuro changes  Echo completed  A1c 6.6  Continue atorvastatin  PT/OT    Type 2 diabetes mellitus with neurologic complication, without long-term current use of insulin (HCC)- (present on admission)  Assessment & Plan  Presenting with ischemic stroke, now with hemorrhagic transformation  A1c 6.6  Has not required SSI  Carbohydrate consistent diet   Consider oral antidiabetics on discharge    COVID-19  Assessment & Plan  Contributing to hypercoagulability and CVA   Requiring 2 L NC overnight  Procal negative  CXR appears stable   DVT study negative   Continue enhanced droplet isolation          VTE:  Heparin  Ulcer: Not Indicated  Lines: None    I have performed a physical exam and reviewed and updated ROS and Plan today (1/30/2022). In review of yesterday's note (1/29/2022), there are no changes except as documented above.     Discussed patient condition and risk of morbidity and/or mortality with Family, RN, Therapies, Charge nurse / hot rounds,  neurology and Eliane ROSAS and Dr. Gonda  The patient remains critically ill.  Critical care time = 44 minutes in directly providing and coordinating critical care and extensive data review.  No time overlap and excludes procedures.

## 2022-01-30 NOTE — PROGRESS NOTES
Neurology Progress Note  Neurohospitalist Service, [facility]    Referring Physician: Jeremy M Gonda, M.D.      Interval History: Stable exam from yesterday post-op.  Good retained strength proximally in L arm and leg, still with gaze preference, L visual field cut, and neglect.  Repeat CTA/P overnight for fluctuating exam without evidence of new LVO.      Review of systems: In addition to what is detailed in the HPI and/or updated in the interval history, all other systems reviewed and are negative.    Past Medical History, Past Surgical History and Social History reviewed and unchanged from prior    Medications:    Current Facility-Administered Medications:   •  heparin infusion 25,000 units in 500 mL 0.45% NACL, 0-30 Units/kg/hr, Intravenous, Continuous, Camila Araujo A.P.R.N., Last Rate: 26.8 mL/hr at 01/30/22 0656, 20 Units/kg/hr at 01/30/22 0656  •  niCARdipine (CARDENE) 25 mg in  mL Infusion, 0-15 mg/hr, Intravenous, Continuous, URMILA Matson.P.R.N., Paused at 01/29/22 2102  •  phenylephrine (TAM-SYNEPHRINE) 40 mg in  mL infusion, 0-300 mcg/min, Intravenous, Continuous, José Miguel Ambrosio M.D., Paused at 01/30/22 0615  •  hydrALAZINE (APRESOLINE) injection 10-20 mg, 10-20 mg, Intravenous, Q2HRS PRN, Eliane Woodson, A.P.R.N.  •  labetalol (NORMODYNE/TRANDATE) injection 10 mg, 10 mg, Intravenous, Q10 MIN PRN, Eliane Woodson, A.P.R.N.  •  senna-docusate (PERICOLACE or SENOKOT S) 8.6-50 MG per tablet 2 Tablet, 2 Tablet, Oral, BID **AND** polyethylene glycol/lytes (MIRALAX) PACKET 1 Packet, 1 Packet, Oral, QDAY PRN **AND** magnesium hydroxide (MILK OF MAGNESIA) suspension 30 mL, 30 mL, Oral, QDAY PRN **AND** bisacodyl (DULCOLAX) suppository 10 mg, 10 mg, Rectal, QDAY PRN, Alec Block M.D.  •  acetaminophen (Tylenol) tablet 650 mg, 650 mg, Oral, Q6HRS PRN, Alec Block M.D.  •  ondansetron (ZOFRAN) syringe/vial injection 4 mg, 4 mg, Intravenous, Q4HRS PRN,  "Alec Block M.D.  •  ondansetron (ZOFRAN ODT) dispertab 4 mg, 4 mg, Oral, Q4HRS PRN, Alec Block M.D.  •  atorvastatin (LIPITOR) tablet 80 mg, 80 mg, Oral, Q EVENING, Alec Block M.D.    Physical Examination:   BP (!) 97/63   Pulse 69   Temp 36.9 °C (98.5 °F) (Temporal)   Resp (!) 24   Ht 1.676 m (5' 5.98\")   Wt 67 kg (147 lb 11.3 oz)   SpO2 95%   BMI 23.85 kg/m²       General: Patient is awake and in no acute distress  Neck: There is normal range of motion  CV: Regular rate   Extremities:  Warm, dry, and intact, without peripheral lower extremity edema    NEUROLOGICAL EXAM:     Mental status: Awake, interactive  Speech and language: Speech with severe dysarthria, speech paucity.   Cranial nerve exam: Decreased blink to threat from L.  There is a L face droop. R gaze preference, tracks only to midline.  Motor exam: There is sustained antigravity with no downward drift in R arm and leg.  L leg is sustained antigravity.  L arm with slight drift  Sensory exam:  Dense sensory loss on L.  Appears to neglect L side  Coordination: No ataxia on spontaneous movements         NIHSS: National Institutes of Health Stroke Scale    [0] 1a:Level of Consciousness    0-alert 1-drowsy   2-stupor   3-coma  [2] 1b:LOC Questions                  0-both  1-one      2-neither  [0] 1c:LOC Commands                   0-both  1-one      2-neither  [2] 2: Best Gaze                     0-nl    1-partial  2-forced  [2] 3: Visual Fields                   0-nl    1-partial  2-complete 3-bilat  [2] 4: Facial Paresis                0-nl    1-minor    2-partial  3-full  MOTOR                       0-nl  [0] 5: Right Arm           1-drift  [1] 6: Left Arm             2-some effort vs gravity  [0] 7: Right Leg           3-no effort vs gravity  [0] 8: Left Leg             4-no movement                             x-untestable  [0] 9: Limb Ataxia                    0-abs   1-1_limb   2-2+_limbs       x-untestable  [2] 10:Sensory  "                       0-nl    1-partial  2-dense  [1] 11:Best Language/Aphasia         0-nl    1-mild/mod 2-severe   3-mute  [2] 12:Dysarthria                     0-nl    1-mild/mod 2-severe       x-untestable  [1] 13:Neglect/Inattention            0-none  1-partial  2-complete  [14] TOTAL      Objective Data:    Labs:  Lab Results   Component Value Date/Time    PROTHROMBTM 15.1 (H) 01/29/2022 11:58 AM    INR 1.23 (H) 01/29/2022 11:58 AM      Lab Results   Component Value Date/Time    WBC 11.9 (H) 01/30/2022 02:00 AM    RBC 4.31 (L) 01/30/2022 02:00 AM    HEMOGLOBIN 13.8 (L) 01/30/2022 02:00 AM    HEMATOCRIT 41.0 (L) 01/30/2022 02:00 AM    MCV 95.1 01/30/2022 02:00 AM    MCH 32.0 01/30/2022 02:00 AM    MCHC 33.7 01/30/2022 02:00 AM    MPV 9.2 01/30/2022 02:00 AM    NEUTSPOLYS 67.40 01/27/2022 03:08 PM    LYMPHOCYTES 22.20 01/27/2022 03:08 PM    MONOCYTES 9.70 01/27/2022 03:08 PM    EOSINOPHILS 0.30 01/27/2022 03:08 PM    BASOPHILS 0.10 01/27/2022 03:08 PM      Lab Results   Component Value Date/Time    SODIUM 136 01/30/2022 02:00 AM    POTASSIUM 4.0 01/30/2022 02:00 AM    CHLORIDE 104 01/30/2022 02:00 AM    CO2 21 01/30/2022 02:00 AM    GLUCOSE 91 01/30/2022 02:00 AM    BUN 11 01/30/2022 02:00 AM    CREATININE 0.70 01/30/2022 02:00 AM      Lab Results   Component Value Date/Time    CHOLSTRLTOT 183 01/28/2022 04:50 AM     (H) 01/28/2022 04:50 AM    HDL 23 (A) 01/28/2022 04:50 AM    TRIGLYCERIDE 128 01/28/2022 04:50 AM       Lab Results   Component Value Date/Time    ALKPHOSPHAT 100 (H) 01/29/2022 02:20 AM    ASTSGOT 24 01/29/2022 02:20 AM    ALTSGPT 30 01/29/2022 02:20 AM    TBILIRUBIN 0.9 01/29/2022 02:20 AM        Imaging/Testing:    I interpreted and/or reviewed the patient's neuroimaging    DX-CHEST-PORTABLE (1 VIEW)   Final Result         1.  Patchy pulmonary infiltrates, greater on the right. Similar compared to prior study.      CT-CTA HEAD WITH & W/O-POST PROCESS   Final Result         1.  Previously  visualized partial occlusion of right M1 segment has resolved status post thrombectomy.   2.  Persistent occlusion of the right distal MCA branch vessel supplying area of infarct, similar to prior study.   3.  Evolving infarct in the right parietotemporal adnexal lobe region.   4.  Stable hyperdensities in the right sylvian fissure and sulci of the right parietotemporal lobe likely stable hemorrhages.      EC-ECHOCARDIOGRAM LTD W/ CONT   Final Result      DX-CHEST-PORTABLE (1 VIEW)   Final Result      1.  Right central line projects over the SVC.   2.  No pneumothorax.   3.  Bilateral airspace opacities compatible with multifocal Covid 19 pneumonia which are not significantly changed.      CT-CEREBRAL PERFUSION ANALYSIS   Final Result      1.  Cerebral blood flow less than 30% likely representing completed infarct = 62 mL.      2.  T Max more than 6 seconds likely representing combination of completed infarct and ischemia = 76 mL.      3.  Mismatched volume likely representing ischemic brain/penumbra = 14 mL      4.  Please note that the cerebral perfusion was performed on the limited brain tissue around the basal ganglia region. Infarct/ischemia outside the CT perfusion sections can be missed in this study.      CT-CTA HEAD WITH & W/O-POST PROCESS   Preliminary Result      Subacute right parieto-occipital infarction without hemorrhagic transformation. The MCA branch vessel supplying this again show contrast cut off      Left thalamic vasogenic edema and subacute hemorrhage exerts mild mass effect, slightly worse than on comparison      New eccentric nonocclusive filling defects in the distal right internal carotid and M1 segments concerning for small thrombus favored over spasm      Similar distal right vertebral artery segmental narrowing may be from spasm or chronic. No focal occlusion and the left vertebral artery is dominant with normal vertebrobasilar confluence      MR-BRAIN-W/O   Final Result      1.   Multifocal areas of acute infarcts in the right frontal, temporal and parietal lobes. . The gradient echo images demonstrates linear area of magnetic susceptibility artifact at the insular cortex likely representing small area hemorrhagic    transformation or thrombus within the vessels.There are petechial microhemorrhages in the right medial posterior temporal lobe.   2.  There is an area of subacute hemorrhage with surrounding white matter edema in the left thalamus. This finding is noted on the previous CT scan dated 1/27/2022.   3.  Mild cerebral volume loss.   4.  Mild chronic microvascular ischemic disease.      CT-HEAD W/O   Final Result      1. Developing hypodensity in the right temporal occipital region consistent with acute infarct.   2. Tiny foci of hemorrhage in the right insular ribbon and possible tiny foci of subarachnoid hemorrhage or contrast staining in the right parietal and temporo-occipital regions.      These findings were messaged via Voalte with JEREMY M GONDA on 1/28/2022 11:58 AM.            US-EXTREMITY VENOUS LOWER BILAT   Final Result      DX-CHEST-PORTABLE (1 VIEW)   Final Result      Patchy airspace process consistent with edema or atelectasis      CT-CTA NECK WITH & W/O-POST PROCESSING   Final Result   Addendum 1 of 1   Addendum:   There is a small nonocclusive arterial thrombus within the right    innominate artery. This is seen on series 9 image 116.      These findings were discussed with REMY CARRERO by Dr. Rock on    1/27/2022.      Final      1.  No hemodynamically significant stenosis of the neck arteries.   2.  Bilateral ill-defined pulmonary opacities in keeping with Covid pneumonia.      CT-CTA HEAD WITH & W/O-POST PROCESS   Final Result      1.  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            Comment: Results discussed with Dr. Carrero at approximately 3:35 PM      CT-CEREBRAL PERFUSION ANALYSIS   Final Result      1.  Nondiagnostic  scan secondary to patient motion.      CT-HEAD W/O   Final Result         NO ACUTE ABNORMALITIES ARE NOTED ON CT SCAN OF THE HEAD.               IR-THROMBO MECHANICAL ARTERY,INIT    (Results Pending)   IR-THROMBO MECHANICAL ARTERY,INIT    (Results Pending)   IR-APPLE,GROSHONG PLACEMENT >5    (Results Pending)       Assessment and Plan:  Geoff Scott is a 66 year old man presenting with R MCA syndrome, found to have a R M2 occlusion, now s/p IV-tPA and successful TICI 2b mechanical thrombectomy.   He had recurrent thrombosis in R M2 and R P2, underwent second thrombectomy on 1/29AM.  Unfortunately exam without significant improvement post-op.  Started on heparin infusion for recurrent thrombosis related likely to COVID.      At this time, there are no treatment options if he develops recurrent thrombosis on heparin- he has a large core that is at high risk for reperfusion injury, and currently on optimal medical therapy for recurrent clotting (so even if we surgically remove clot for the third time- we cannot prevent additional events). We cannot pursue more aggressive permissive hypertension given his evidence of hemorrhagic conversion and high risk for further hemorrhage.  Will maintain supportive measures below.  Given lack of treatment options for fluctuating exam changes, will only need to be monitor for catastrophic injury- which will manifest as depressed LOC or pupillary changes- indicative of a catastrophic brain hemorrhage- for which there will be no treatment options.  Family have been extensively updated at bedside.    Problem list:  1.  R MCA stroke   2.  S/p IV-tPA and mechanical thrombectomy x2  3.  COVID positive    Plan:   - neurochecks/NIHSS per post-thrombectomy protocol, then ok for q4h neurochecks   - SBP goal 100-140; given hemorrhagic conversion, and high risk for catastrophic brain hemorrhage   - continue no-bolus heparin protocol- pending clinical stability in several days, will transition  to oral anticoagulation with apixaban   - stroke labs:  HgbA1c 6.6 and    - continue atorvastatin 80mg daily for goal LDL < 70   - long-term cardiac monitoring at discharge   - PT/OT/SLP as able    The evaluation of the patient, and recommended management, was discussed with the ICU staff. I have performed a physical exam and reviewed and updated ROS and Plan today (1/30/2022).     Alec Arechiga MD  Neurohospitalist, Acute Care Services

## 2022-01-30 NOTE — PROGRESS NOTES
2117 - Contacted Dr. Rock, neurology, for neurologic changes (decreased left hand , drift in left upper and left lower extremities, increased dysarthria, and slightly increased facial droop). SBP dropping to 100's while patient sleeps.    Orders received for CT and CTA.     2230 -   Dr. Ambrosio and Dr. Rock updated about increased hypotension, BP sustaining 90's/60's. 500cc NS bolus and Neosynephrine ordered, see MAR. Updated patient's wife Clara.      2330 - Dr. Ambrosio and Dr. Rock updated about severe heart rate (40's-70's) and SBP (110's-170's) lability, snoring with periods of apnea, and worsening neurologic exam (almost completely unintelligible, falling asleep mid-conversation, and left hand  absent).      2342 - Dr. Ambrosio at bedside, SBP goal 130-150, chest x-ray and pro-calcitonin ordered.    0130 -  Dr. Ambrosio at bedside, SBP goal 110-140

## 2022-01-30 NOTE — DIETARY
Brief Nutrition Services: TF consult received however pt passed swallow evaluation. Reviewed with RN. No plans for TF as diet advanced to Soft and Bite sized, thin liquids with 1:1 supervision. Plan/Rec: Discontinued TF orders. RD to monitor for adequate intake.

## 2022-01-30 NOTE — THERAPY
Speech Language Pathology  Daily Treatment     Patient Name: Geoff Scott  Age:  66 y.o., Sex:  male  Medical Record #: [id number]  Today's Date: 1/30/2022     Precautions  Precautions: Fall Risk,Swallow Precautions ( See Comments)    Assessment    Patient seen for re-assessment of swallow function. Per RN, pt had another CVA yesterday and was NPO for re-assessment. RN reported patient's speech has become more dysarthric since new stroke, but he appears A&O x4. Left-sided neglect significant. Patient awake and alert with family present. A&Ox4 for this SLP. Significant slurred speech and reduced loudness noted, however, patient able to successfully articulate wants/needs. Oral motor examination revealed lingual deviation and generalized weakness, with reduced volitional cough response. Reduced labial seal d/t weakness. He required 1:1 feeding assistance due to continued difficulty with depth perception (difficulty locating cup/spoon without hand over hand assistance) as well as left-sided neglect. He consumed ice chips, thin liquids via cup and straw, liquidized textures, puree, soft and bite size textures and regular textures. Patient able to masticate and clear soft and dry solids. Observed cough x1 following sequential sips of thin liquids via straw. Patient with mildly prolonged mastication with regular textures and reduced strength.   At this time, recommend diet of thin liquids - single sips - with soft and bite size textures. 1:1 feeding assistance. Medication to be crushed in puree. Please d/c with coughing or changes in mentation.    Plan  1) Thin liquids, single sips, soft and bite size textures  2) 1:1 feeding assist   3) Cognitive-linguistic evaluation when appropriate     Continue current treatment plan.    Discharge Recommendations: Recommend post-acute placement for additional speech therapy services prior to discharge home    Subjective  Patient seen this date for dysphagia re-assessment.      Objective       01/30/22 1020   Charge Group   SLP Swallowing Dysfunction Treatment Swallowing Dysfunction Treatment   Dysphagia    Dysphagia X   Positioning / Behavior Modification Self Monitoring;Modulate Rate or Bite Size;Alternate Solids and Liquids;Other (see Comments)  (1:1 feeding assistance)   Oral / Pharyngeal / Laryngeal Exercises Lingual Exercises   Other Treatments PO presentations including ice chips, thin liquids, liquidized textures, puree textures, soft and bite size textures and regular textures. Patient and family education regarding dysphagia/dysarthria following CVA   Diet / Liquid Recommendation Soft & Bite-Sized (6) - (Dysphagia III);Thin (0)   Nutritional Liquid Intake Rating Scale Non thickened beverages   Nutritional Food Intake Rating Scale Total oral diet with multiple consistencies without special preparation but with specific food limitations   Nursing Communication Swallow Precaution Sign Posted at Head of Bed   Skilled Intervention Tactile Cueing;Compensatory Strategies;Other (See Comments)  (1:1 feeding assist)   Comments Significant left-side neglect   Recommended Route of Medication Administration   Medication Administration  Crush all Medications in Puree   Short Term Goals   Short Term Goal # 1 Patient will consume regular diet w/ thin liquids w/ assistance as needed w/ no overt s/sx of aspiration.    Goal Outcome # 1 Goal not met   Short Term Goal # 2 Patient will consume diet of thin liquids with soft and bite size textures without overt s/s of aspiration    Education Group   Education Provided Dysphagia;Dysarthria / Speech;CVA Right Hemisphere;CVA Left Hemisphere;Medications / Pain Control;Role of Speech Therapy   CVA / Rt Hemisphere Patient Response Family;Patient;Acceptance;Demonstration;Reinforcement Needed;Action Demonstration   CVA Lt Hemisphere Patient Response Patient;Family;Acceptance;Demonstration;Action Demonstration;Reinforcement Needed   Dysphagia Patient Response  Patient;Family;Acceptance;Demonstration;Action Demonstration;Reinforcement Needed   Dysarthria / Speech Patient Response Patient;Family;Acceptance;Demonstration;Action Demonstration;Reinforcement Needed   Meds / Pain Control Patient Response Family;Patient;Acceptance;Demonstration;Reinforcement Needed;Action Demonstration   Role of SLP Patient Response Patient;Family;Acceptance;Demonstration;Action Demonstration;Reinforcement Needed   Anticipated Discharge Needs   Discharge Recommendations Recommend post-acute placement for additional speech therapy services prior to discharge home   Therapy Recommendations Upon DC Dysphagia Training;Patient / Family / Caregiver Education;Community Re-Integration   Interdisciplinary Plan of Care Collaboration   IDT Collaboration with  Nursing;Family / Caregiver   Patient Position at End of Therapy In Bed;Call Light within Reach;Tray Table within Reach;Phone within Reach;Family / Friend in Room   Collaboration Comments RN updated with results/recs

## 2022-01-31 LAB
ANION GAP SERPL CALC-SCNC: 10 MMOL/L (ref 7–16)
BUN SERPL-MCNC: 13 MG/DL (ref 8–22)
CALCIUM SERPL-MCNC: 8.4 MG/DL (ref 8.5–10.5)
CHLORIDE SERPL-SCNC: 102 MMOL/L (ref 96–112)
CO2 SERPL-SCNC: 23 MMOL/L (ref 20–33)
CREAT SERPL-MCNC: 0.74 MG/DL (ref 0.5–1.4)
ERYTHROCYTE [DISTWIDTH] IN BLOOD BY AUTOMATED COUNT: 44.3 FL (ref 35.9–50)
GLUCOSE SERPL-MCNC: 81 MG/DL (ref 65–99)
HCT VFR BLD AUTO: 42.2 % (ref 42–52)
HGB BLD-MCNC: 15 G/DL (ref 14–18)
MAGNESIUM SERPL-MCNC: 2.2 MG/DL (ref 1.5–2.5)
MCH RBC QN AUTO: 33.1 PG (ref 27–33)
MCHC RBC AUTO-ENTMCNC: 35.5 G/DL (ref 33.7–35.3)
MCV RBC AUTO: 93.2 FL (ref 81.4–97.8)
PHOSPHATE SERPL-MCNC: 2.6 MG/DL (ref 2.5–4.5)
PLATELET # BLD AUTO: 313 K/UL (ref 164–446)
PMV BLD AUTO: 8.9 FL (ref 9–12.9)
POTASSIUM SERPL-SCNC: 3.9 MMOL/L (ref 3.6–5.5)
RBC # BLD AUTO: 4.53 M/UL (ref 4.7–6.1)
SODIUM SERPL-SCNC: 135 MMOL/L (ref 135–145)
UFH PPP CHRO-ACNC: 0.48 IU/ML
WBC # BLD AUTO: 10.2 K/UL (ref 4.8–10.8)

## 2022-01-31 PROCEDURE — 700102 HCHG RX REV CODE 250 W/ 637 OVERRIDE(OP): Performed by: NURSE PRACTITIONER

## 2022-01-31 PROCEDURE — 85520 HEPARIN ASSAY: CPT

## 2022-01-31 PROCEDURE — A9270 NON-COVERED ITEM OR SERVICE: HCPCS | Performed by: NURSE PRACTITIONER

## 2022-01-31 PROCEDURE — 83735 ASSAY OF MAGNESIUM: CPT

## 2022-01-31 PROCEDURE — 770000 HCHG ROOM/CARE - INTERMEDIATE ICU *

## 2022-01-31 PROCEDURE — 97166 OT EVAL MOD COMPLEX 45 MIN: CPT

## 2022-01-31 PROCEDURE — 99233 SBSQ HOSP IP/OBS HIGH 50: CPT | Performed by: PSYCHIATRY & NEUROLOGY

## 2022-01-31 PROCEDURE — 99252 IP/OBS CONSLTJ NEW/EST SF 35: CPT | Performed by: HOSPITALIST

## 2022-01-31 PROCEDURE — 700111 HCHG RX REV CODE 636 W/ 250 OVERRIDE (IP): Performed by: NURSE PRACTITIONER

## 2022-01-31 PROCEDURE — 85027 COMPLETE CBC AUTOMATED: CPT

## 2022-01-31 PROCEDURE — 84100 ASSAY OF PHOSPHORUS: CPT

## 2022-01-31 PROCEDURE — 99232 SBSQ HOSP IP/OBS MODERATE 35: CPT | Performed by: NURSE PRACTITIONER

## 2022-01-31 PROCEDURE — 80048 BASIC METABOLIC PNL TOTAL CA: CPT

## 2022-01-31 PROCEDURE — 97162 PT EVAL MOD COMPLEX 30 MIN: CPT

## 2022-01-31 RX ADMIN — ATORVASTATIN CALCIUM 80 MG: 40 TABLET, FILM COATED ORAL at 18:47

## 2022-01-31 RX ADMIN — HEPARIN SODIUM 20 UNITS/KG/HR: 5000 INJECTION, SOLUTION INTRAVENOUS at 08:29

## 2022-01-31 RX ADMIN — AMLODIPINE BESYLATE 5 MG: 5 TABLET ORAL at 06:31

## 2022-01-31 ASSESSMENT — ENCOUNTER SYMPTOMS
ABDOMINAL PAIN: 0
MYALGIAS: 0
BACK PAIN: 0
FEVER: 0
SORE THROAT: 0
NERVOUS/ANXIOUS: 0
COUGH: 0
SHORTNESS OF BREATH: 0
FOCAL WEAKNESS: 1
HEADACHES: 0
SPEECH CHANGE: 1
NAUSEA: 0

## 2022-01-31 ASSESSMENT — COGNITIVE AND FUNCTIONAL STATUS - GENERAL
STANDING UP FROM CHAIR USING ARMS: A LOT
PERSONAL GROOMING: A LITTLE
DAILY ACTIVITIY SCORE: 14
CLIMB 3 TO 5 STEPS WITH RAILING: TOTAL
SUGGESTED CMS G CODE MODIFIER DAILY ACTIVITY: CK
DRESSING REGULAR UPPER BODY CLOTHING: A LOT
DRESSING REGULAR LOWER BODY CLOTHING: A LOT
MOBILITY SCORE: 7
WALKING IN HOSPITAL ROOM: TOTAL
MOVING FROM LYING ON BACK TO SITTING ON SIDE OF FLAT BED: UNABLE
SUGGESTED CMS G CODE MODIFIER MOBILITY: CM
HELP NEEDED FOR BATHING: A LOT
EATING MEALS: A LITTLE
TOILETING: A LOT
MOVING TO AND FROM BED TO CHAIR: UNABLE
TURNING FROM BACK TO SIDE WHILE IN FLAT BAD: UNABLE

## 2022-01-31 ASSESSMENT — GAIT ASSESSMENTS: GAIT LEVEL OF ASSIST: UNABLE TO PARTICIPATE

## 2022-01-31 ASSESSMENT — ACTIVITIES OF DAILY LIVING (ADL): TOILETING: INDEPENDENT

## 2022-01-31 NOTE — THERAPY
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 PMhx of R BKA with prosthesis and CVA. Encouraged wife to sit on pt's L side, encourage AROM of LUE and functional use, and joint protection for L shoulder. Wife assisted with providing accurate PLOF and home setup. Reports she is able to assist PRN 24/7, but uses a FWW herself. Also lives with grandchildren x3, one who is old enough to assist when not working. Currently limited by decreased functional mobility, activity tolerance, cognition, sensation, strength, AROM, coordination, balance, vision/neglect and pain which are currently affecting pt's ability to complete ADLs/IADLs at baseline. Will continue to follow.     Plan    Recommend Occupational Therapy 4 times per week until therapy goals are met for the following treatments:  Adaptive Equipment, Neuro Re-Education / Balance, Self Care/Activities of Daily Living, Therapeutic Activities and Therapeutic Exercises.    DC Equipment Recommendations: Unable to determine at this time  Discharge Recommendations: Recommend post-acute placement for additional occupational therapy services prior to discharge home     Objective     01/31/22 1347   Prior Living Situation   Prior Services Home-Independent   Housing / Facility 1 Story House   Steps Into Home 1   Bathroom Set up Bathtub / Shower  Combination   Equipment Owned None   Lives with - Patient's Self Care Capacity Spouse   Comments Wife assisted with providing accurate PLOF and home setup. Reports she is able to assist PRN 24/7, but uses a FWW herself. Also lives with grandchildren x3, once who is old enough to assist when not working   Prior Level of ADL Function   Self Feeding Independent   Grooming / Hygiene Independent   Bathing Independent   Dressing Independent   Toileting Independent   Prior Level of IADL Function   Medication Management Independent   Laundry Independent   Kitchen Mobility Independent   Finances Independent   Home Management Independent   Shopping Independent   Prior Level Of Mobility Supervision Without Device in Home;Supervision Without Device in Community   Driving / Transportation Driving Independent   Precautions   Precautions Fall Risk;Swallow Precautions ( See Comments)   Comments COVID 19, hx of R BKA with prosthetic, 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 difficult to understand even when asked to speak up; req tactile and v/cs for termination especially with RUE. Pt with L neglect req max v/cs to attend; limited ability to maintain   Passive ROM Upper Body   Passive ROM Upper Body X   Comments L elbow and shoulder; unclear if d/t tone or resistance to PROM   Active ROM Upper Body   Active ROM Upper Body  X   Dominant Hand Right   Comments LUE with weakness but shoulder about 70 degrees, and elbow to about 140 degrees. able to maintin wrist in  neutral but no AROM in L hand   Strength Upper Body   Upper Body Strength  X   Lt Shoulder Flexion Strength 3- (F-)   Lt Elbow Flexion Strength 3- (F-)   Lt Wrist Flexion Strength 2 (P)   Left  Absent   Comments No AROM in L hand, neutral wrist. Req v/cs to initiate LUE movement in sitting   Sensation Upper Body   Upper Extremity Sensation  X   Comments no withdraw from pain on LUE in sitting   Upper Body Muscle Tone   Upper Body Muscle Tone  X   Lt Upper Extremity Muscle Tone Hypertonic  (elbow and shoulder; unclear if resisting d/t cognition)   Neurological Concerns   Neurological Concerns Yes   Sitting Posture During ADL's Posterior Lean;Lateral Lean Right  (initially with posterior lean but improved during session)   Lt Upper Extremity Functional Use Impaired  (no  strength)   Coordination Upper Body   Coordination X   Comments GM and FM impaired on RUE when reaching for therapist, and on L with minimal coordination   Balance Assessment   Sitting Balance (Static) Fair -   Sitting Balance (Dynamic) Poor +   Standing Balance (Static) Poor -   Standing Balance (Dynamic) Trace +   Weight Shift Sitting Fair   Weight Shift Standing Poor   Comments CGA and intermittent min A at EOB; req mod A for BLEs/balance during txfs   Bed Mobility    Supine to Sit Moderate Assist   Sit to Supine   (left in chair)   Scooting Minimal Assist   Comments HOB flat   ADL Assessment   Grooming Minimal Assist;Seated  (wiping face )   Lower Body Dressing Maximal Assist  (don prosthesis and L sock)   Toileting Maximal Assist  (lance and bedpan)   Comments Encouraged wife to sit on pt's L side, encourage AROM of LUE and functional use, and joint protection for L shoulder.    Modified [place] (mRS)   Modified [place] Score 4   Functional Mobility   Sit to Stand Moderate Assist   Bed, Chair, Wheelchair Transfer Moderate Assist   Transfer Method Stand Pivot   Mobility w/B HHA; req facilitation of BLEs for txf.   ICU Target Mobility Level    ICU Mobility - Targeted Level Level 3B   Visual Perception   Visual Perception  X   Comments pt with R gaze, able to turn head, but unable to follow with eyes across midline. Req max v/cs to attend to LUE/LLE   Edema / Skin Assessment   Edema / Skin  Not Assessed   Activity Tolerance   Comments no c/o fatigue or SOB; left in chair   Patient / Family Goals   Patient / Family Goal #1 to go home   Short Term Goals   Short Term Goal # 1 attend to L side w/min v/cs during ADL/txfs   Short Term Goal # 2 LB dressing with min A   Short Term Goal # 3 BSC txf with min A   Short Term Goal # 4 use of LUE w/o v/cs during BUE seated g/h req min A   Education Group   Education Provided Role of Occupational Therapist;Pathology of bedrest;Upper Extremity Range of Motion   Role of Occupational Therapist Patient Response Patient;Family;Acceptance;Explanation;Verbal Demonstration;Reinforcement Needed   Upper Ext ROM Patient Response Patient;Family;Acceptance;Explanation;Action Demonstration;Reinforcement Needed;Demonstration   Pathology of Bedrest Patient Response Patient;Family;Acceptance;Explanation;Verbal Demonstration;Reinforcement Needed   Problem List   Problem List Decreased Active Daily Living Skills;Decreased Homemaking Skills;Decreased Upper Extremity Strength Left;Decreased Upper Extremity AROM Left;Decreased Upper Extremity PROM Left;Decreased Functional Mobility;Decreased Activity Tolerance;Safety Awareness Deficits / Cognition;Impaired Posture / Trunk Alignment;Impaired Coordination Left Upper Extremity;Impaired Coordination Right Upper Extremity;Impaired Sensation Left Upper Extremity;Impaired Cognitive Function;Impaired Upper Extremity Tone Left;Impaired Vision;Impaired Postural Control / Balance

## 2022-01-31 NOTE — DISCHARGE PLANNING
COVID + 01-27-22.  [facility] will no longer follow.  Please reach out to myself @ [phone] with any questions.

## 2022-01-31 NOTE — PROGRESS NOTES
Neurology Progress Note  Neurohospitalist Service, [facility]    Referring Physician: Jeremy M Gonda, M.D.      Interval History: Stable exam, continues with R preference, RUE distal weakness, dysarthria.  Anti-Xa levels therapeutic this AM.  Happy that the [place] [employer] won yesterday.    Review of systems: In addition to what is detailed in the HPI and/or updated in the interval history, all other systems reviewed and are negative.    Past Medical History, Past Surgical History and Social History reviewed and unchanged from prior    Medications:    Current Facility-Administered Medications:   •  acetaminophen (Tylenol) tablet 650 mg, 650 mg, Oral, Q6HRS PRN, Camila Araujo, A.P.R.N.  •  atorvastatin (LIPITOR) tablet 80 mg, 80 mg, Oral, Q EVENING, Camila Araujo, A.P.R.N., 80 mg at 01/30/22 1717  •  ondansetron (ZOFRAN ODT) dispertab 4 mg, 4 mg, Oral, Q4HRS PRN, Camila Araujo, A.P.R.N.  •  senna-docusate (PERICOLACE or SENOKOT S) 8.6-50 MG per tablet 2 Tablet, 2 Tablet, Oral, BID, 2 Tablet at 01/30/22 1717 **AND** polyethylene glycol/lytes (MIRALAX) PACKET 1 Packet, 1 Packet, Oral, QDAY PRN **AND** magnesium hydroxide (MILK OF MAGNESIA) suspension 30 mL, 30 mL, Oral, QDAY PRN **AND** bisacodyl (DULCOLAX) suppository 10 mg, 10 mg, Rectal, QDAY PRN, Camila rAaujo, A.P.R.N.  •  amLODIPine (NORVASC) tablet 5 mg, 5 mg, Oral, Q DAY, Camila Araujo, A.P.R.N., 5 mg at 01/31/22 0631  •  heparin infusion 25,000 units in 500 mL 0.45% NACL, 0-30 Units/kg/hr, Intravenous, Continuous, Camila Araujo A.P.R.N., Last Rate: 26.8 mL/hr at 01/31/22 0829, 20 Units/kg/hr at 01/31/22 0829  •  hydrALAZINE (APRESOLINE) injection 10-20 mg, 10-20 mg, Intravenous, Q2HRS PRN, Eliane Woodson, A.P.R.N.  •  labetalol (NORMODYNE/TRANDATE) injection 10 mg, 10 mg, Intravenous, Q10 MIN PRN, URMILA Rossi.P.R.N.  •  ondansetron (ZOFRAN) syringe/vial injection 4 mg, 4 mg, Intravenous, Q4HRS PRN, Alec Block,  "M.D.    Physical Examination:   /71   Pulse 86   Temp 37.1 °C (98.8 °F) (Temporal)   Resp (!) 41   Ht 1.676 m (5' 5.98\")   Wt 67 kg (147 lb 11.3 oz)   SpO2 89%   BMI 23.85 kg/m²       General: Patient is awake and in no acute distress  Neck: There is normal range of motion  CV: Regular rate   Extremities:  Warm, dry, and intact, without peripheral lower extremity edema    NEUROLOGICAL EXAM:     Mental status: Awake, interactive  Speech and language: Speech with significant dysarthria, speech paucity.  Follows commands readily  Cranial nerve exam: Decreased blink to threat from L.  There is a L face droop. R gaze preference, tracks only to midline.  Motor exam: There is sustained antigravity with no downward drift in R arm and leg.  L leg is sustained antigravity.  L arm with slight drift.  There is L hand  weakness  Sensory exam:  Dense sensory loss on L.  Appears to neglect L side  Coordination: No ataxia on spontaneous movements         NIHSS: National Institutes of Health Stroke Scale    [0] 1a:Level of Consciousness    0-alert 1-drowsy   2-stupor   3-coma  [0] 1b:LOC Questions                  0-both  1-one      2-neither  [0] 1c:LOC Commands                   0-both  1-one      2-neither  [2] 2: Best Gaze                     0-nl    1-partial  2-forced  [2] 3: Visual Fields                   0-nl    1-partial  2-complete 3-bilat  [2] 4: Facial Paresis                0-nl    1-minor    2-partial  3-full  MOTOR                       0-nl  [0] 5: Right Arm           1-drift  [1] 6: Left Arm             2-some effort vs gravity  [0] 7: Right Leg           3-no effort vs gravity  [0] 8: Left Leg             4-no movement                             x-untestable  [0] 9: Limb Ataxia                    0-abs   1-1_limb   2-2+_limbs       x-untestable  [2] 10:Sensory                        0-nl    1-partial  2-dense  [1] 11:Best Language/Aphasia         0-nl    1-mild/mod 2-severe   3-mute  [2] " 12:Dysarthria                     0-nl    1-mild/mod 2-severe       x-untestable  [1] 13:Neglect/Inattention            0-none  1-partial  2-complete  [13] TOTAL      Objective Data:    Labs:  Lab Results   Component Value Date/Time    PROTHROMBTM 15.1 (H) 01/29/2022 11:58 AM    INR 1.23 (H) 01/29/2022 11:58 AM      Lab Results   Component Value Date/Time    WBC 10.2 01/31/2022 06:30 AM    RBC 4.53 (L) 01/31/2022 06:30 AM    HEMOGLOBIN 15.0 01/31/2022 06:30 AM    HEMATOCRIT 42.2 01/31/2022 06:30 AM    MCV 93.2 01/31/2022 06:30 AM    MCH 33.1 (H) 01/31/2022 06:30 AM    MCHC 35.5 (H) 01/31/2022 06:30 AM    MPV 8.9 (L) 01/31/2022 06:30 AM    NEUTSPOLYS 67.40 01/27/2022 03:08 PM    LYMPHOCYTES 22.20 01/27/2022 03:08 PM    MONOCYTES 9.70 01/27/2022 03:08 PM    EOSINOPHILS 0.30 01/27/2022 03:08 PM    BASOPHILS 0.10 01/27/2022 03:08 PM      Lab Results   Component Value Date/Time    SODIUM 135 01/31/2022 06:30 AM    POTASSIUM 3.9 01/31/2022 06:30 AM    CHLORIDE 102 01/31/2022 06:30 AM    CO2 23 01/31/2022 06:30 AM    GLUCOSE 81 01/31/2022 06:30 AM    BUN 13 01/31/2022 06:30 AM    CREATININE 0.74 01/31/2022 06:30 AM      Lab Results   Component Value Date/Time    CHOLSTRLTOT 183 01/28/2022 04:50 AM     (H) 01/28/2022 04:50 AM    HDL 23 (A) 01/28/2022 04:50 AM    TRIGLYCERIDE 128 01/28/2022 04:50 AM       Lab Results   Component Value Date/Time    ALKPHOSPHAT 100 (H) 01/29/2022 02:20 AM    ASTSGOT 24 01/29/2022 02:20 AM    ALTSGPT 30 01/29/2022 02:20 AM    TBILIRUBIN 0.9 01/29/2022 02:20 AM        Imaging/Testing:    I interpreted and/or reviewed the patient's neuroimaging    DX-CHEST-PORTABLE (1 VIEW)   Final Result         1.  Patchy pulmonary infiltrates, greater on the right. Similar compared to prior study.      CT-CTA HEAD WITH & W/O-POST PROCESS   Final Result         1.  Previously visualized partial occlusion of right M1 segment has resolved status post thrombectomy.   2.  Persistent occlusion of the right  distal MCA branch vessel supplying area of infarct, similar to prior study.   3.  Evolving infarct in the right parietotemporal adnexal lobe region.   4.  Stable hyperdensities in the right sylvian fissure and sulci of the right parietotemporal lobe likely stable hemorrhages.      EC-ECHOCARDIOGRAM LTD W/ CONT   Final Result      DX-CHEST-PORTABLE (1 VIEW)   Final Result      1.  Right central line projects over the SVC.   2.  No pneumothorax.   3.  Bilateral airspace opacities compatible with multifocal Covid 19 pneumonia which are not significantly changed.      CT-CEREBRAL PERFUSION ANALYSIS   Final Result      1.  Cerebral blood flow less than 30% likely representing completed infarct = 62 mL.      2.  T Max more than 6 seconds likely representing combination of completed infarct and ischemia = 76 mL.      3.  Mismatched volume likely representing ischemic brain/penumbra = 14 mL      4.  Please note that the cerebral perfusion was performed on the limited brain tissue around the basal ganglia region. Infarct/ischemia outside the CT perfusion sections can be missed in this study.      CT-CTA HEAD WITH & W/O-POST PROCESS   Preliminary Result      Subacute right parieto-occipital infarction without hemorrhagic transformation. The MCA branch vessel supplying this again show contrast cut off      Left thalamic vasogenic edema and subacute hemorrhage exerts mild mass effect, slightly worse than on comparison      New eccentric nonocclusive filling defects in the distal right internal carotid and M1 segments concerning for small thrombus favored over spasm      Similar distal right vertebral artery segmental narrowing may be from spasm or chronic. No focal occlusion and the left vertebral artery is dominant with normal vertebrobasilar confluence      MR-BRAIN-W/O   Final Result      1.  Multifocal areas of acute infarcts in the right frontal, temporal and parietal lobes. . The gradient echo images demonstrates linear  area of magnetic susceptibility artifact at the insular cortex likely representing small area hemorrhagic    transformation or thrombus within the vessels.There are petechial microhemorrhages in the right medial posterior temporal lobe.   2.  There is an area of subacute hemorrhage with surrounding white matter edema in the left thalamus. This finding is noted on the previous CT scan dated 1/27/2022.   3.  Mild cerebral volume loss.   4.  Mild chronic microvascular ischemic disease.      CT-HEAD W/O   Final Result      1. Developing hypodensity in the right temporal occipital region consistent with acute infarct.   2. Tiny foci of hemorrhage in the right insular ribbon and possible tiny foci of subarachnoid hemorrhage or contrast staining in the right parietal and temporo-occipital regions.      These findings were messaged via Voalte with JEREMY M GONDA on 1/28/2022 11:58 AM.            US-EXTREMITY VENOUS LOWER BILAT   Final Result      DX-CHEST-PORTABLE (1 VIEW)   Final Result      Patchy airspace process consistent with edema or atelectasis      CT-CTA NECK WITH & W/O-POST PROCESSING   Final Result   Addendum 1 of 1   Addendum:   There is a small nonocclusive arterial thrombus within the right    innominate artery. This is seen on series 9 image 116.      These findings were discussed with REMY CARRERO by Dr. Rock on    1/27/2022.      Final      1.  No hemodynamically significant stenosis of the neck arteries.   2.  Bilateral ill-defined pulmonary opacities in keeping with Covid pneumonia.      CT-CTA HEAD WITH & W/O-POST PROCESS   Final Result      1.  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            Comment: Results discussed with Dr. Carrero at approximately 3:35 PM      CT-CEREBRAL PERFUSION ANALYSIS   Final Result      1.  Nondiagnostic scan secondary to patient motion.      CT-HEAD W/O   Final Result         NO ACUTE ABNORMALITIES ARE NOTED ON CT SCAN OF THE HEAD.                IR-THROMBO MECHANICAL ARTERY,INIT    (Results Pending)   IR-THROMBO MECHANICAL ARTERY,INIT    (Results Pending)   IR-APPLE,GROSHONG PLACEMENT >5    (Results Pending)       Assessment and Plan:  Geoff Scott is a 66 year old man presenting with R MCA syndrome, found to have a R M2 occlusion, now s/p IV-tPA and successful TICI 2b mechanical thrombectomy.   He had recurrent thrombosis in R M2 and R P2, underwent second thrombectomy on 1/29AM.  Unfortunately exam without significant improvement post-op.  Started on heparin infusion for recurrent thrombosis related likely to COVID.      At this time, there are no treatment options if he develops recurrent thrombosis on heparin- he has a large core that is at high risk for reperfusion injury, and currently on optimal medical therapy for recurrent clotting (so even if we surgically remove clot for the third time- we cannot prevent additional events). We cannot pursue more aggressive permissive hypertension given his evidence of hemorrhagic conversion and high risk for further hemorrhage.  Will maintain supportive measures below.  Given lack of treatment options for fluctuating exam changes, will only need to be monitor for catastrophic injury- which will manifest as depressed LOC or pupillary changes- indicative of a catastrophic brain hemorrhage- for which there will be no treatment options.  Family have been extensively updated at bedside. Recommend close q2h neuromonitoring- for additional 24 hours given high risk for catastrophic decompensation.    Problem list:  1.  R MCA stroke   2.  S/p IV-tPA and mechanical thrombectomy x2  3.  COVID positive    Plan:   - q2h neurochecks/NIHSS- [place] [facility] stable for next 24 hours, will transition to q4h neurochecks   - SBP goal 100-140; given hemorrhagic conversion, and high risk for catastrophic brain hemorrhage   - continue no-bolus heparin protocol- pending clinical stability in 1-2 days, will  transition to oral anticoagulation with apixaban   - stroke labs:  HgbA1c 6.6 and    - continue atorvastatin 80mg daily for goal LDL < 70   - long-term cardiac monitoring at discharge   - PT/OT/SLP as able    The evaluation of the patient, and recommended management, was discussed with the ICU staff. I have performed a physical exam and reviewed and updated ROS and Plan today (1/31/2022).     Alec Arechiga MD  Neurohospitalist, Acute Care Services

## 2022-01-31 NOTE — CONSULTS
Hospital Medicine Consultation    Date of Service  1/31/2022    Referring Physician  Luis Angel Romero M.D.    Consulting Physician  Larry Crooks D.O.    Reason for Consultation  Transfer of care out of ICU for acute stroke.    History of Presenting Illness  Geoff Scott is a 66 y.o. male with a history of a right below the knee amputation and prior stroke who presented 1/27/2022 COVID19 positive with left sided weakness and dysarthria.  On CT he has a right M2 occlusion.  He received TPA and had a thrombectomy.      1/31/22:  Alert & oriented but with slurred speech.  Swallowing and eating.  Left side neglect.  Wife at bedside.  Patient in contact isolation due to COVID.     Review of Systems  Review of Systems   Constitutional: Positive for malaise/fatigue. Negative for fever.   HENT: Negative for congestion and sore throat.    Respiratory: Negative for cough and shortness of breath.    Cardiovascular: Negative for chest pain and leg swelling.   Gastrointestinal: Negative for abdominal pain and nausea.   Genitourinary: Negative for dysuria.   Musculoskeletal: Negative for back pain and myalgias.   Neurological: Positive for speech change and focal weakness. Negative for headaches.   Psychiatric/Behavioral: The patient is not nervous/anxious.        Past Medical History   has a past medical history of right BKA (HCC) and Stroke (HCC).    Surgical History   has no past surgical history on file.    Family History  family history is not on file.    Social History   reports that he has been smoking. He has never used smokeless tobacco. He reports current drug use. Drug: Inhaled marijuana. He reports that he does not drink alcohol.    Medications  Current Facility-Administered Medications   Medication Dose Route Frequency Provider Last Rate Last Admin   • acetaminophen (Tylenol) tablet 650 mg  650 mg Oral Q6HRS PRN LAUREANO Matson.       • atorvastatin (LIPITOR) tablet 80 mg  80 mg Oral Q EVENING Camila KELLY  URMILA Araujo.P.R.N.   80 mg at 01/30/22 1717   • ondansetron (ZOFRAN ODT) dispertab 4 mg  4 mg Oral Q4HRS PRN URMILA Matson.P.R.N.       • senna-docusate (PERICOLACE or SENOKOT S) 8.6-50 MG per tablet 2 Tablet  2 Tablet Oral BID URMILA Matson.P.R.N.   2 Tablet at 01/30/22 1717    And   • polyethylene glycol/lytes (MIRALAX) PACKET 1 Packet  1 Packet Oral QDAY PRN URMILA Matson.P.R.N.        And   • magnesium hydroxide (MILK OF MAGNESIA) suspension 30 mL  30 mL Oral QDAY PRN URMILA Matson.P.R.N.        And   • bisacodyl (DULCOLAX) suppository 10 mg  10 mg Rectal QDAY PRN URMILA Matson.P.R.N.       • amLODIPine (NORVASC) tablet 5 mg  5 mg Oral Q DAY URMILA Matson.P.R.N.   5 mg at 01/31/22 0631   • heparin infusion 25,000 units in 500 mL 0.45% NACL  0-30 Units/kg/hr Intravenous Continuous URMILA Matson.P.R.N. 26.8 mL/hr at 01/31/22 0829 20 Units/kg/hr at 01/31/22 0829   • hydrALAZINE (APRESOLINE) injection 10-20 mg  10-20 mg Intravenous Q2HRS PRN Eliane Woodson A.P.R.N.       • labetalol (NORMODYNE/TRANDATE) injection 10 mg  10 mg Intravenous Q10 MIN PRN Eliane Woodson, A.P.R.N.       • ondansetron (ZOFRAN) syringe/vial injection 4 mg  4 mg Intravenous Q4HRS PRN Alec Block M.D.           Allergies  Allergies   Allergen Reactions   • Banana Hives and Itching   • Grape, Artificial Hives and Itching     ALLERGIC TO GRAPES.       Physical Exam  Temp:  [36.9 °C (98.5 °F)-37.1 °C (98.8 °F)] 37.1 °C (98.8 °F)  Pulse:  [] 89  Resp:  [14-41] 28  BP: (105-159)/(63-84) 115/66  SpO2:  [89 %-99 %] 89 %    Physical Exam  Vitals reviewed.   Constitutional:       Appearance: Normal appearance. He is not diaphoretic.   HENT:      Head: Normocephalic and atraumatic.      Nose: Nose normal.      Mouth/Throat:      Mouth: Mucous membranes are dry.      Pharynx: No oropharyngeal exudate.   Eyes:      General: No scleral icterus.        Right eye: No discharge.         Left eye: No  discharge.      Conjunctiva/sclera: Conjunctivae normal.      Comments: Left side neglect   Cardiovascular:      Rate and Rhythm: Normal rate and regular rhythm.      Pulses:           Radial pulses are 2+ on the right side and 2+ on the left side.        Dorsalis pedis pulses are 2+ on the right side and 2+ on the left side.      Heart sounds: No murmur heard.      Pulmonary:      Effort: Pulmonary effort is normal. No respiratory distress.      Breath sounds: Normal breath sounds. No wheezing or rales.   Abdominal:      General: Bowel sounds are normal. There is no distension.      Palpations: Abdomen is soft.      Tenderness: There is no abdominal tenderness.   Musculoskeletal:         General: No swelling or tenderness.      Cervical back: No tenderness. No muscular tenderness.      Right lower leg: No edema.      Left lower leg: No edema.   Lymphadenopathy:      Cervical: No cervical adenopathy.   Skin:     Coloration: Skin is not jaundiced or pale.   Neurological:      Mental Status: He is alert and oriented to person, place, and time.      Cranial Nerves: Cranial nerve deficit present.      Motor: Weakness present.      Coordination: Coordination abnormal.   Psychiatric:         Mood and Affect: Mood normal.         Behavior: Behavior normal.         Fluids  Date 01/31/22 0700 - 02/01/22 0659   Shift 3449-7348 6603-2202 7725-6622 24 Hour Total   INTAKE   I.V. 26.8   26.8   Shift Total 26.8   26.8   OUTPUT   Urine 100   100   Shift Total 100   100   Weight (kg) 67 67 67 67       Laboratory  Recent Labs     01/29/22 0220 01/30/22  0200 01/31/22  0630   WBC 9.9 11.9* 10.2   RBC 4.69* 4.31* 4.53*   HEMOGLOBIN 15.2 13.8* 15.0   HEMATOCRIT 44.3 41.0* 42.2   MCV 94.5 95.1 93.2   MCH 32.4 32.0 33.1*   MCHC 34.3 33.7 35.5*   RDW 45.1 45.3 44.3   PLATELETCT 358 374 313   MPV 9.1 9.2 8.9*     Recent Labs     01/29/22 0220 01/30/22  0200 01/31/22  0630   SODIUM 137 136 135   POTASSIUM 4.3 4.0 3.9   CHLORIDE 105 104 102    CO2 20 21 23   GLUCOSE 92 91 81   BUN 11 11 13   CREATININE 0.72 0.70 0.74   CALCIUM 8.5 8.1* 8.4*     Recent Labs     01/29/22  1158   APTT 40.5*   INR 1.23*                 Imaging  DX-CHEST-PORTABLE (1 VIEW)   Final Result         1.  Patchy pulmonary infiltrates, greater on the right. Similar compared to prior study.      CT-CTA HEAD WITH & W/O-POST PROCESS   Final Result         1.  Previously visualized partial occlusion of right M1 segment has resolved status post thrombectomy.   2.  Persistent occlusion of the right distal MCA branch vessel supplying area of infarct, similar to prior study.   3.  Evolving infarct in the right parietotemporal adnexal lobe region.   4.  Stable hyperdensities in the right sylvian fissure and sulci of the right parietotemporal lobe likely stable hemorrhages.      EC-ECHOCARDIOGRAM LTD W/ CONT   Final Result      DX-CHEST-PORTABLE (1 VIEW)   Final Result      1.  Right central line projects over the SVC.   2.  No pneumothorax.   3.  Bilateral airspace opacities compatible with multifocal Covid 19 pneumonia which are not significantly changed.      CT-CEREBRAL PERFUSION ANALYSIS   Final Result      1.  Cerebral blood flow less than 30% likely representing completed infarct = 62 mL.      2.  T Max more than 6 seconds likely representing combination of completed infarct and ischemia = 76 mL.      3.  Mismatched volume likely representing ischemic brain/penumbra = 14 mL      4.  Please note that the cerebral perfusion was performed on the limited brain tissue around the basal ganglia region. Infarct/ischemia outside the CT perfusion sections can be missed in this study.      CT-CTA HEAD WITH & W/O-POST PROCESS   Preliminary Result      Subacute right parieto-occipital infarction without hemorrhagic transformation. The MCA branch vessel supplying this again show contrast cut off      Left thalamic vasogenic edema and subacute hemorrhage exerts mild mass effect, slightly worse than  on comparison      New eccentric nonocclusive filling defects in the distal right internal carotid and M1 segments concerning for small thrombus favored over spasm      Similar distal right vertebral artery segmental narrowing may be from spasm or chronic. No focal occlusion and the left vertebral artery is dominant with normal vertebrobasilar confluence      MR-BRAIN-W/O   Final Result      1.  Multifocal areas of acute infarcts in the right frontal, temporal and parietal lobes. . The gradient echo images demonstrates linear area of magnetic susceptibility artifact at the insular cortex likely representing small area hemorrhagic    transformation or thrombus within the vessels.There are petechial microhemorrhages in the right medial posterior temporal lobe.   2.  There is an area of subacute hemorrhage with surrounding white matter edema in the left thalamus. This finding is noted on the previous CT scan dated 1/27/2022.   3.  Mild cerebral volume loss.   4.  Mild chronic microvascular ischemic disease.      CT-HEAD W/O   Final Result      1. Developing hypodensity in the right temporal occipital region consistent with acute infarct.   2. Tiny foci of hemorrhage in the right insular ribbon and possible tiny foci of subarachnoid hemorrhage or contrast staining in the right parietal and temporo-occipital regions.      These findings were messaged via Voalte with JEREMY M GONDA on 1/28/2022 11:58 AM.            US-EXTREMITY VENOUS LOWER BILAT   Final Result      DX-CHEST-PORTABLE (1 VIEW)   Final Result      Patchy airspace process consistent with edema or atelectasis      CT-CTA NECK WITH & W/O-POST PROCESSING   Final Result   Addendum 1 of 1   Addendum:   There is a small nonocclusive arterial thrombus within the right    innominate artery. This is seen on series 9 image 116.      These findings were discussed with REMY CARRERO by Dr. Rock on    1/27/2022.      Final      1.  No hemodynamically significant  stenosis of the neck arteries.   2.  Bilateral ill-defined pulmonary opacities in keeping with Covid pneumonia.      CT-CTA HEAD WITH & W/O-POST PROCESS   Final Result      1.  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            Comment: Results discussed with Dr. Beyer at approximately 3:35 PM      CT-CEREBRAL PERFUSION ANALYSIS   Final Result      1.  Nondiagnostic scan secondary to patient motion.      CT-HEAD W/O   Final Result         NO ACUTE ABNORMALITIES ARE NOTED ON CT SCAN OF THE HEAD.               IR-THROMBO MECHANICAL ARTERY,INIT    (Results Pending)   IR-THROMBO MECHANICAL ARTERY,INIT    (Results Pending)   IR-APPLE,GROSHONG PLACEMENT >5    (Results Pending)       Assessment/Plan  * Acute ischemic stroke (HCC)- (present on admission)  Assessment & Plan  Atorvastatin 80mg q HS  Heparin drip.  BP monitoring  Neurochecks closely while on heparin drip and MRI finding of hemorrhage.  Neurology consulting.  Aspiration precautions  ST/PT/OT  Physiatry  1/29 Echo:Limited echo for CVA evaulation. Normal chamber sizes. Normal LVEF   visaully at 70%. No significant valuvlar disease noted. Bubble study       negative for R > L shunt.   1/29 MRI Brain:  1.  Multifocal areas of acute infarcts in the right frontal, temporal and parietal lobes. . The gradient echo images demonstrates linear area of magnetic susceptibility artifact at the insular cortex likely representing small area hemorrhagic   transformation or thrombus within the vessels.There are petechial microhemorrhages in the right medial posterior temporal lobe.  2.  There is an area of subacute hemorrhage with surrounding white matter edema in the left thalamus. This finding is noted on the previous CT scan dated 1/27/2022.  3.  Mild cerebral volume loss.  4.  Mild chronic microvascular ischemic disease.    Type 2 diabetes mellitus with neurologic complication, without long-term current use of insulin (HCC)- (present on  admission)  Assessment & Plan  1/29 A1c:6.6  Monitor accuchecks.  SSI if needed  Diabetic diet.    COVID-19  Assessment & Plan  Isolation precautions.  Supportive care

## 2022-01-31 NOTE — CARE PLAN
The patient is Watcher - Medium risk of patient condition declining or worsening    Shift Goals  Clinical Goals: -140, Neuro stability  Patient Goals: Rest  Family Goals: Improvement    Progress made toward(s) clinical / shift goals: Mobility OOB to Chair, Worked with PT and OT.  Prosthesis at bedside,    Patient is not progressing towards the following goals:

## 2022-01-31 NOTE — THERAPY
Missed Therapy     Patient Name: Geoff Scott  Age:  66 y.o., Sex:  male  Medical Record #: [id number]  Today's Date: 1/31/2022    OT consult rec'd. Pt with strict bedrest orders after increasing neuro symptoms. Will hold and will re-attempt as appropriate/able once bedrest orders d/c'd.

## 2022-01-31 NOTE — THERAPY
"Physical Therapy   Initial Evaluation     Patient Name: Geoff Scott  Age:  66 y.o., Sex:  male  Medical Record #: [id number]  Today's Date: 1/31/2022     Precautions  Precautions: Fall Risk;Swallow Precautions ( See Comments)  Comments: COVID 19, s/p M1 thrombectomy x2 and TPA    Assessment  Patient is 66 y.o. male that presented to acute 1/27 with complaints of L sided weakness and slurred speech. Medical dx of COVID, R MCA occlusion. PMHx significant for R BKA, DM2. Patient is s/p TPA and thrombectomy, on 1/28 had hemorrhagic transformation and another thrombectomy 1/29. He presented to PT with impaired motor control, apparent impaired cognition, impaired communication, impaired balance and coordination, functional weakness, and decreased activity tolerance which are limiting his ability to safely perform mobility at [facility]. He was able to stand from EOB and perform stand pivot transfer with mod A/HHA x2; he required facilitation of trunk and LE extension and BLE limb advancement. Note patient did not have all parts for proper fit of RLE prosthesis so ill fit may have contributed to poor weight shifting in standing. Spouse plans to bring prosthetic sock to bedside. He demonstrated L inattention but was able to move L extremities with verbal cueing (increased deficits in LUE versus LLE), no visual tracking beyond midline observed during session. Will follow.    Plan    Recommend Physical Therapy 5 times per week until therapy goals are met for the following treatments:  Bed Mobility, Community Re-integration, Equipment, Gait Training, Manual Therapy, Neuro Re-Education / Balance, Self Care/Home Evaluation, Stair Training, Therapeutic Activities and Therapeutic Exercises    DC Equipment Recommendations: Unable to determine at this time  Discharge Recommendations: Recommend post-acute placement for additional physical therapy services prior to discharge home       Subjective    \"I want some water.\"  \"I haven't seen my " "leg in 5 days.\" (referring to prosthesis)     Objective       01/31/22 1220   Total Time Spent   Total Time Spent (Mins) 37   Charge Group   PT Evaluation PT Evaluation Mod   Initial Contact Note    Initial Contact Note Order Received and Verified, Physical Therapy Evaluation in Progress with Full Report to Follow.   Precautions   Precautions Fall Risk;Swallow Precautions ( See Comments)   Comments chronic R BKA   Vitals   O2 Delivery Device Silicone Nasal Cannula   Pain 0 - 10 Group   Therapist Pain Assessment   (no pain complaint during session)   Prior Living Situation   Prior Services None   Housing / Facility Mobile Home   Steps Into Home 4   Equipment Owned None   Lives with - Patient's Self Care Capacity Spouse  (and 3 grandchildren)   Comments Patient reported he lives with wife and grandkids. Wife Clara at bedside and supportive, demonstrated good recall of prior education from medical team regarding neurological recovery   Prior Level of Functional Mobility   Bed Mobility Independent   Transfer Status Independent   Ambulation Independent   Distance Ambulation (Feet)   (community)   Assistive Devices Used None  (RLE prosthesis)   Stairs Independent   Cognition    Cognition / Consciousness X   Speech/ Communication   (hypophonic)   Level of Consciousness Alert   Safety Awareness Impaired   Attention Impaired   Comments difficult to hear and understand, pleasant, cooperative, eager to participate   Passive ROM Lower Body   Passive ROM Lower Body WDL   Active ROM Lower Body    Active ROM Lower Body  WDL   Comments chronic R BKA; others WFL   Strength Lower Body   Lower Body Strength  WDL   Comments as above   Sensation Lower Body   Lower Extremity Sensation   Not Tested   Lower Body Muscle Tone   Lower Body Muscle Tone  WDL   Neurological Concerns   Neurological Concerns Yes   Comments given presentation and medical dx   Coordination Upper Body   Comments gross deficits LUE observed in function   Coordination " Lower Body    Coordination Lower Body  WDL   Balance Assessment   Sitting Balance (Static) Fair -   Sitting Balance (Dynamic) Fair -   Standing Balance (Static) Poor -   Standing Balance (Dynamic) Trace +   Weight Shift Sitting Fair   Weight Shift Standing Poor   Comments CGA while sitting EOB; min to mod A during standing with R prosthesis and HHA x2   Gait Analysis   Gait Level Of Assist Unable to Participate   Weight Bearing Status no restrictions   Vision Deficits Impacting Mobility NT   Bed Mobility    Supine to Sit Moderate Assist   Sit to Supine   (NT, left in chair)   Scooting Minimal Assist  (seated)   Functional Mobility   Sit to Stand Moderate Assist   Bed, Chair, Wheelchair Transfer Moderate Assist   Transfer Method Stand Pivot   ICU Target Mobility Level   ICU Mobility - Targeted Level Level 3B   How much difficulty does the patient currently have...   Turning over in bed (including adjusting bedclothes, sheets and blankets)? 1   Sitting down on and standing up from a chair with arms (e.g., wheelchair, bedside commode, etc.) 1   Moving from lying on back to sitting on the side of the bed? 1   How much help from another person does the patient currently need...   Moving to and from a bed to a chair (including a wheelchair)? 2   Need to walk in a hospital room? 1   Climbing 3-5 steps with a railing? 1   6 clicks Mobility Score 7   Activity Tolerance   Sitting in Chair left in chair   Sitting Edge of Bed 20 min   Standing 5-6 min   Comments no overt pain, SOB, dizziness. fatigue following activity   Edema / Skin Assessment   Edema / Skin  Not Assessed   Patient / Family Goals    Patient / Family Goal #1 return to PLOF   Short Term Goals    Short Term Goal # 1 Patient will move supine<>sitting EOB without bed features with supervision within 6tx in order to get in/out of bed   Short Term Goal # 2 Patient will move sitting<>standing with supervision within 6tx in order to initiate transfers and gait   Short  Term Goal # 3 Patient will ambulate 15ft with mod A within 6tx in order to access environment   Education Group   Education Provided Role of Physical Therapist   Role of Physical Therapist Patient Response Patient;Acceptance;Explanation;Verbal Demonstration   CVA Patient Response Patient;Significant Other;Acceptance;Explanation;Verbal Demonstration   Problem List    Problems Impaired Bed Mobility;Impaired Transfers;Impaired Ambulation;Impaired Balance;Decreased Activity Tolerance;Safety Awareness Deficits / Cognition   Anticipated Discharge Equipment and Recommendations   DC Equipment Recommendations Unable to determine at this time   Discharge Recommendations Recommend post-acute placement for additional physical therapy services prior to discharge home   Interdisciplinary Plan of Care Collaboration   IDT Collaboration with  Nursing;Occupational Therapist   Patient Position at End of Therapy Seated;Chair Alarm On;Call Light within Reach;Tray Table within Reach;Phone within Reach;Family / Friend in Room   Collaboration Comments RN aware of visit, response   Session Information   Date / Session Number  1/31-1 (1/5, 2/6)   Priority   (CVA)

## 2022-01-31 NOTE — PROGRESS NOTES
Mr. Scott is a 66 year old male with history of traumatic left BKA presented 1/27/22 for acute onset left sided weakness and slurred speech. His symptoms started abruptly at 1400 1/27/21. He was brought in by EMS for a code stroke, NIH on arrival was 16. Imaging revealed a right M2 occulsion. Patient received IV-tPA and underwent endovascular clot retrieval resulting in TICI 2b flow. Patient admitted to ICU.   1/28 hemorrhagic transformation of stroke, strict SBP parameters.   1/29 acute neuro change: Significant right gaze deviation, worsening dysarthria, pt back to IR for thrombectomy M2 and P1 TICI 2B.  Started on heparin drip.  SBP goal 100-140  1/30 -no acute events.  Continues with right gaze deviation    Seen and examined this morning.  Case discussed with neurology.  We will keep him on the heparin drip for now and anticipate transitioning to Eliquis in the next 2 to 3 days, ok to liberalize activity.     No longer requires ICU care.  Transfer to IMCU floor.  Discussed with Hospitalist, Dr. Crooks, who will assume care.  Critical care service is available for any questions or concerns.      LAUREANO Rossi.

## 2022-01-31 NOTE — ASSESSMENT & PLAN NOTE
Atorvastatin 80mg q HS  Now on Eliquis  BP monitoring  Neurochecks closely while on heparin drip and MRI finding of hemorrhage.  Decreased to every 4 hours, transfer to neurology  Neurology consulting.  Aspiration precautions  SLP/PT/OT  COVID recovered on 2/3   Rehab declined.  SNF pending - medically cleared   He has no insurance

## 2022-02-01 LAB
UFH PPP CHRO-ACNC: 0.22 IU/ML
UFH PPP CHRO-ACNC: 0.34 IU/ML
UFH PPP CHRO-ACNC: 0.87 IU/ML

## 2022-02-01 PROCEDURE — 700111 HCHG RX REV CODE 636 W/ 250 OVERRIDE (IP): Performed by: HOSPITALIST

## 2022-02-01 PROCEDURE — 99231 SBSQ HOSP IP/OBS SF/LOW 25: CPT | Performed by: PSYCHIATRY & NEUROLOGY

## 2022-02-01 PROCEDURE — 85520 HEPARIN ASSAY: CPT

## 2022-02-01 PROCEDURE — A9270 NON-COVERED ITEM OR SERVICE: HCPCS | Performed by: NURSE PRACTITIONER

## 2022-02-01 PROCEDURE — 770001 HCHG ROOM/CARE - MED/SURG/GYN PRIV*

## 2022-02-01 PROCEDURE — 99232 SBSQ HOSP IP/OBS MODERATE 35: CPT | Performed by: HOSPITALIST

## 2022-02-01 PROCEDURE — 700102 HCHG RX REV CODE 250 W/ 637 OVERRIDE(OP): Performed by: NURSE PRACTITIONER

## 2022-02-01 PROCEDURE — 700111 HCHG RX REV CODE 636 W/ 250 OVERRIDE (IP): Performed by: NURSE PRACTITIONER

## 2022-02-01 RX ADMIN — HEPARIN SODIUM 20 UNITS/KG/HR: 5000 INJECTION, SOLUTION INTRAVENOUS at 03:45

## 2022-02-01 RX ADMIN — HEPARIN SODIUM 20 UNITS/KG/HR: 5000 INJECTION, SOLUTION INTRAVENOUS at 22:30

## 2022-02-01 RX ADMIN — ATORVASTATIN CALCIUM 80 MG: 40 TABLET, FILM COATED ORAL at 17:13

## 2022-02-01 ASSESSMENT — PAIN DESCRIPTION - PAIN TYPE
TYPE: ACUTE PAIN

## 2022-02-01 ASSESSMENT — FIBROSIS 4 INDEX
FIB4 SCORE: 0.91
FIB4 SCORE: 0.91

## 2022-02-01 NOTE — CARE PLAN
Problem: Optimal Care of the Stroke Patient  Goal: Optimal emergency care for the stroke patient  Outcome: Progressing  Goal: Optimal acute care for the stroke patient  Outcome: Progressing     Problem: Knowledge Deficit - Stroke Education  Goal: Patient's knowledge of stroke and risk factors will improve  Outcome: Progressing     Problem: Psychosocial - Patient Condition  Goal: Patient's ability to verbalize feelings about condition will improve  Outcome: Progressing  Goal: Patient's ability to re-evaluate and adapt role responsibilities will improve  Outcome: Progressing     Problem: Discharge Planning - Stroke  Goal: Ensure Stroke Core Measures are met prior to discharge  Outcome: Progressing  Goal: Patient’s continuum of care needs will be met  Outcome: Progressing     Problem: Neuro Status  Goal: Neuro status will remain stable or improve  Outcome: Progressing     Problem: Hemodynamic Monitoring  Goal: Patient's hemodynamics, fluid balance and neurologic status will be stable or improve  Outcome: Progressing     Problem: Respiratory - Stroke Patient  Goal: Patient will achieve/maintain optimum respiratory rate/effort  Outcome: Progressing     Problem: Dysphagia  Goal: Dysphagia will improve  Outcome: Progressing     Problem: Risk for Aspiration  Goal: Patient's risk for aspiration will be absent or decrease  Outcome: Progressing     Problem: Urinary Elimination  Goal: Establish and maintain regular urinary output  Outcome: Progressing     Problem: Bowel Elimination  Goal: Establish and maintain regular bowel function  Outcome: Progressing     Problem: Mobility - Stroke  Goal: Patient's capacity to carry out activities will improve  Outcome: Progressing  Goal: Spasticity will be prevented or improved  Outcome: Progressing  Goal: Subluxation will be prevented or improved  Outcome: Progressing     Problem: Self Care  Goal: Patient will have the ability to perform ADLs independently or with assistance (bathe,  groom, dress, toilet and feed)  Outcome: Progressing     Problem: Knowledge Deficit - Standard  Goal: Patient and family/care givers will demonstrate understanding of plan of care, disease process/condition, diagnostic tests and medications  Outcome: Progressing     Problem: Skin Integrity  Goal: Skin integrity is maintained or improved  Outcome: Progressing     Problem: Fall Risk  Goal: Patient will remain free from falls  Outcome: Progressing     Problem: Pain - Standard  Goal: Alleviation of pain or a reduction in pain to the patient’s comfort goal  Outcome: Progressing

## 2022-02-01 NOTE — PROGRESS NOTES
Neurology Progress Note  Neurohospitalist Service, [facility]    Referring Physician: Keron White M.D.    Chief Complaint   Patient presents with   • Possible Stroke     Onset at 1400, R sided gaze deviation & L sided facial droop, L sided arm & leg drift. BG 98 ons scene with REMSA. IV placed en route, no thinners.       HPI: Refer to initial documented Neurology H&P, as detailed in the patient's chart.    Interval History [unfilled]:     Review of systems: In addition to what is detailed in the HPI and/or updated in the interval history, all other systems reviewed and are negative.    Past Medical History:    has a past medical history of right BKA (HCC) and Stroke (HCC).    FHx:  family history is not on file.    SHx:   reports that he has been smoking. He has never used smokeless tobacco. He reports current drug use. Drug: Inhaled. He reports that he does not drink alcohol.    Medications:    Current Facility-Administered Medications:   •  acetaminophen (Tylenol) tablet 650 mg, 650 mg, Oral, Q6HRS PRN, Camila Araujo, A.P.R.N.  •  atorvastatin (LIPITOR) tablet 80 mg, 80 mg, Oral, Q EVENING, Camila Araujo, A.P.R.N., 80 mg at 01/31/22 1847  •  ondansetron (ZOFRAN ODT) dispertab 4 mg, 4 mg, Oral, Q4HRS PRN, Camila Araujo, A.P.R.N.  •  senna-docusate (PERICOLACE or SENOKOT S) 8.6-50 MG per tablet 2 Tablet, 2 Tablet, Oral, BID, 2 Tablet at 01/30/22 1717 **AND** polyethylene glycol/lytes (MIRALAX) PACKET 1 Packet, 1 Packet, Oral, QDAY PRN **AND** magnesium hydroxide (MILK OF MAGNESIA) suspension 30 mL, 30 mL, Oral, QDAY PRN **AND** bisacodyl (DULCOLAX) suppository 10 mg, 10 mg, Rectal, QDAY PRN, Camila Araujo, A.P.R.N.  •  amLODIPine (NORVASC) tablet 5 mg, 5 mg, Oral, Q DAY, Camila Araujo A.P.R.N., 5 mg at 01/31/22 0631  •  heparin infusion 25,000 units in 500 mL 0.45% NACL, 0-30 Units/kg/hr, Intravenous, Continuous, BOZENA MatsonP.R.NYash, Last Rate: 24.1 mL/hr at  02/01/22 0703, 18 Units/kg/hr at 02/01/22 0703  •  hydrALAZINE (APRESOLINE) injection 10-20 mg, 10-20 mg, Intravenous, Q2HRS PRN, Eliane Woodson A.P.R.N.  •  labetalol (NORMODYNE/TRANDATE) injection 10 mg, 10 mg, Intravenous, Q10 MIN PRN, Eliane Woodson A.P.R.N.  •  ondansetron (ZOFRAN) syringe/vial injection 4 mg, 4 mg, Intravenous, Q4HRS PRN, Alec Block M.D.    Physical Examination:     Vitals:    02/01/22 0400 02/01/22 0500 02/01/22 0600 02/01/22 0800   BP: 102/68 125/63 108/61 138/63   Pulse: 77 69 65 66   Resp: (!) 24 (!) 27 14 14   Temp: 36.9 °C (98.4 °F)   36.6 °C (97.9 °F)   TempSrc: Temporal      SpO2: 94% 97% 96% 97%   Weight: 66.9 kg (147 lb 7.8 oz)      Height:               NEUROLOGICAL EXAM:   Patient is awake and alert.  Following commands.  Has a dense right-sided gaze preference with neglect to the left side.  However is able to move all fours antigravity.  Has a left facial droop.  Appears have decreased vision on the left compared to right to threat.  Sensory loss on the left.    Objective Data:    Labs:  Lab Results   Component Value Date/Time    PROTHROMBTM 15.1 (H) 01/29/2022 11:58 AM    INR 1.23 (H) 01/29/2022 11:58 AM      Lab Results   Component Value Date/Time    WBC 10.2 01/31/2022 06:30 AM    RBC 4.53 (L) 01/31/2022 06:30 AM    HEMOGLOBIN 15.0 01/31/2022 06:30 AM    HEMATOCRIT 42.2 01/31/2022 06:30 AM    MCV 93.2 01/31/2022 06:30 AM    MCH 33.1 (H) 01/31/2022 06:30 AM    MCHC 35.5 (H) 01/31/2022 06:30 AM    MPV 8.9 (L) 01/31/2022 06:30 AM    NEUTSPOLYS 67.40 01/27/2022 03:08 PM    LYMPHOCYTES 22.20 01/27/2022 03:08 PM    MONOCYTES 9.70 01/27/2022 03:08 PM    EOSINOPHILS 0.30 01/27/2022 03:08 PM    BASOPHILS 0.10 01/27/2022 03:08 PM      Lab Results   Component Value Date/Time    SODIUM 135 01/31/2022 06:30 AM    POTASSIUM 3.9 01/31/2022 06:30 AM    CHLORIDE 102 01/31/2022 06:30 AM    CO2 23 01/31/2022 06:30 AM    GLUCOSE 81 01/31/2022 06:30 AM    BUN 13 01/31/2022 06:30 AM     CREATININE 0.74 01/31/2022 06:30 AM      Lab Results   Component Value Date/Time    CHOLSTRLTOT 183 01/28/2022 04:50 AM     (H) 01/28/2022 04:50 AM    HDL 23 (A) 01/28/2022 04:50 AM    TRIGLYCERIDE 128 01/28/2022 04:50 AM       Lab Results   Component Value Date/Time    ALKPHOSPHAT 100 (H) 01/29/2022 02:20 AM    ASTSGOT 24 01/29/2022 02:20 AM    ALTSGPT 30 01/29/2022 02:20 AM    TBILIRUBIN 0.9 01/29/2022 02:20 AM        Imaging/Testing:  DX-CHEST-PORTABLE (1 VIEW)   Final Result         1.  Patchy pulmonary infiltrates, greater on the right. Similar compared to prior study.      CT-CTA HEAD WITH & W/O-POST PROCESS   Final Result         1.  Previously visualized partial occlusion of right M1 segment has resolved status post thrombectomy.   2.  Persistent occlusion of the right distal MCA branch vessel supplying area of infarct, similar to prior study.   3.  Evolving infarct in the right parietotemporal adnexal lobe region.   4.  Stable hyperdensities in the right sylvian fissure and sulci of the right parietotemporal lobe likely stable hemorrhages.      EC-ECHOCARDIOGRAM LTD W/ CONT   Final Result      IR-THROMBO MECHANICAL ARTERY,INIT   Final Result      1.  Filling defects at the inferior M2 segment and fetal right posterior cerebral artery.   2.  Spontaneous resolution of inferior M2 segment filling defect.   3.  Mechanical thrombectomy of fetal posterior cerebral artery using RED 62. Final angiogram demonstrates patent blood vessels indicating TICI 2b flow.   4.  Successful right internal jugular central venous catheter placement.      IR-SERAFIN APPLE PLACEMENT >5   Final Result      1.  Filling defects at the inferior M2 segment and fetal right posterior cerebral artery.   2.  Spontaneous resolution of inferior M2 segment filling defect.   3.  Mechanical thrombectomy of fetal posterior cerebral artery using RED 62. Final angiogram demonstrates patent blood vessels indicating TICI 2b flow.   4.   Successful right internal jugular central venous catheter placement.      DX-CHEST-PORTABLE (1 VIEW)   Final Result      1.  Right central line projects over the SVC.   2.  No pneumothorax.   3.  Bilateral airspace opacities compatible with multifocal Covid 19 pneumonia which are not significantly changed.      CT-CEREBRAL PERFUSION ANALYSIS   Final Result      1.  Cerebral blood flow less than 30% likely representing completed infarct = 62 mL.      2.  T Max more than 6 seconds likely representing combination of completed infarct and ischemia = 76 mL.      3.  Mismatched volume likely representing ischemic brain/penumbra = 14 mL      4.  Please note that the cerebral perfusion was performed on the limited brain tissue around the basal ganglia region. Infarct/ischemia outside the CT perfusion sections can be missed in this study.      CT-CTA HEAD WITH & W/O-POST PROCESS   Final Result      Subacute right parieto-occipital infarction without hemorrhagic transformation. The MCA branch vessel supplying this again show contrast cut off      Left thalamic vasogenic edema and subacute hemorrhage exerts mild mass effect, slightly worse than on comparison      New eccentric nonocclusive filling defects in the distal right internal carotid and M1 segments concerning for small thrombus favored over spasm      Similar distal right vertebral artery segmental narrowing may be from spasm or chronic. No focal occlusion and the left vertebral artery is dominant with normal vertebrobasilar confluence      MR-BRAIN-W/O   Final Result      1.  Multifocal areas of acute infarcts in the right frontal, temporal and parietal lobes. . The gradient echo images demonstrates linear area of magnetic susceptibility artifact at the insular cortex likely representing small area hemorrhagic    transformation or thrombus within the vessels.There are petechial microhemorrhages in the right medial posterior temporal lobe.   2.  There is an area of  subacute hemorrhage with surrounding white matter edema in the left thalamus. This finding is noted on the previous CT scan dated 1/27/2022.   3.  Mild cerebral volume loss.   4.  Mild chronic microvascular ischemic disease.      CT-HEAD W/O   Final Result      1. Developing hypodensity in the right temporal occipital region consistent with acute infarct.   2. Tiny foci of hemorrhage in the right insular ribbon and possible tiny foci of subarachnoid hemorrhage or contrast staining in the right parietal and temporo-occipital regions.      These findings were messaged via Voalte with JEREMY M GONDA on 1/28/2022 11:58 AM.            US-EXTREMITY VENOUS LOWER BILAT   Final Result      DX-CHEST-PORTABLE (1 VIEW)   Final Result      Patchy airspace process consistent with edema or atelectasis      IR-THROMBO MECHANICAL ARTERY,INIT   Final Result      1.  Occluded inferior M2, superior M2 and some of the M3 branches.   2.  The fetal right PCA demonstrates thrombus at the middle of the procedure.   3.  Aspiration thrombectomy- inferior M2-RED 62-patent branches.   4.  Aspiration thrombectomy-superior M2-RED 62-patent branches.   5.  Aspiration thrombectomy-fetal right posterior cerebral artery-RED 62-patent branches.   6.  The final TICI score:2b      CT-CTA NECK WITH & W/O-POST PROCESSING   Final Result   Addendum 1 of 1   Addendum:   There is a small nonocclusive arterial thrombus within the right    innominate artery. This is seen on series 9 image 116.      These findings were discussed with REMY CARRERO by Dr. Rock on    1/27/2022.      Final      1.  No hemodynamically significant stenosis of the neck arteries.   2.  Bilateral ill-defined pulmonary opacities in keeping with Covid pneumonia.      CT-CTA HEAD WITH & W/O-POST PROCESS   Final Result      1.  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            Comment: Results discussed with Dr. Carrero at approximately 3:35 PM       CT-CEREBRAL PERFUSION ANALYSIS   Final Result      1.  Nondiagnostic scan secondary to patient motion.      CT-HEAD W/O   Final Result         NO ACUTE ABNORMALITIES ARE NOTED ON CT SCAN OF THE HEAD.                     Assessment and Plan:    66-year-old male presenting with right MCA syndrome status post TPA in thrombectomy with a TICI score 2B.  The patient reclotted required a second thrombectomy on the 29th.  Patient continues to have left-sided neglect with preserved strength.  He is Covid positive, etiology strokes most likely to hypercoagulable state due to underlying Covid.  Currently on heparin.  Okay to discharge from the [facility].  Every 4 neurochecks.  Okay to start Eliquis starting tomorrow morning.  Discontinue heparin after the second dose.  PT and OT.  Most likely will need inpatient rehab.  Will need long-term cardiac monitoring at discharge.  Blood pressure goal should be 100-140 systolic.    The evaluation of the patient, and recommended management, was discussed with the resident staff. I have performed a physical exam and reviewed and updated ROS and Plan today (2/1/2022). In review of yesterday's note (1/31/2022), there are no changes except as documented above.    This chart was partially generated using voice recognition technology and sound alike word replacement may be present, best efforts were made to make the chart accurate.    Alec Santillan MD  Board Certified Neurology, ABPN  t) [phone]

## 2022-02-01 NOTE — PROGRESS NOTES
Hospital Medicine Daily Progress Note    Date of Service  2/1/2022    Chief Complaint  Geoff Scott is a 66 y.o. male admitted 1/27/2022 with an acute CVA    Hospital Course  This is a unfortunate 66-year-old male with a history of right below-knee amputation, prior stroke, presented on 1/27, COVID-19 positive, acute onset of left-sided weakness and dysarthria, the patient initially received IV TPA and underwent endovascular clot retrieval, he was noted to have hemorrhagic transformation of the stroke, on 1/29 had acute neuro changes again with right gaze deviation and worsening dysarthria, the patient taken back to the IR suite for additional thrombectomy of the M2 and P1 segment, started on a heparin drip.  Neurology consulting, the patient be continued  Left-sided  weakness, left facial droop and dysarthria.    Interval Problem Update  Patient seen and examined today.    Patient tolerating treatment and therapies.  All Data, Medication data reviewed.  Case discussed with nursing as available.  Plan of Care reviewed with patient and notified of changes.  2/1 the patient continues to be weaker on the left, he requires assistance for feeding, he is currently afebrile, heart rate in 90s, he is on 2 L nasal cannula oxygen, blood pressure 120/76,  Laboratory data is reviewed, fairly benign, continues on a heparin drip with transition to Eliquis as per neurology tomorrow.  The patient will likely require acute rehab versus skilled nursing facility type rehab.    I have personally seen and examined the patient at bedside. I discussed the plan of care with patient.    Consultants/Specialty  neurology    Code Status  Full Code    Disposition  Patient is not medically cleared for discharge.   Anticipate discharge to to skilled nursing facility.  Versus acute rehab  I have placed the appropriate orders for post-discharge needs.    Review of Systems  Review of Systems   Constitutional: Positive for malaise/fatigue.   HENT:  Negative.    Eyes: Negative.    Respiratory: Negative.    Cardiovascular: Negative.    Gastrointestinal: Negative.    Genitourinary: Negative.    Musculoskeletal: Negative.    Skin: Negative.    Neurological: Positive for speech change and focal weakness.   Endo/Heme/Allergies: Negative.    Psychiatric/Behavioral: The patient is nervous/anxious.    All other systems reviewed and are negative.       Physical Exam  Temp:  [36.4 °C (97.5 °F)-37.1 °C (98.8 °F)] 36.9 °C (98.4 °F)  Pulse:  [] 65  Resp:  [9-64] 14  BP: ()/(57-81) 108/61  SpO2:  [89 %-98 %] 96 %    Physical Exam  Vitals and nursing note reviewed.   Constitutional:       Appearance: He is well-developed.   HENT:      Head: Normocephalic.      Comments: Left facial droop  Eyes:      Pupils: Pupils are equal, round, and reactive to light.   Cardiovascular:      Rate and Rhythm: Normal rate and regular rhythm.      Heart sounds: Normal heart sounds.   Pulmonary:      Effort: Pulmonary effort is normal.   Abdominal:      General: Bowel sounds are normal.      Palpations: Abdomen is soft.   Genitourinary:     Penis: Normal.       Rectum: Normal.   Musculoskeletal:         General: Normal range of motion.      Cervical back: Normal range of motion.      Comments: Right BKA   Skin:     General: Skin is warm.   Neurological:      Mental Status: He is alert and oriented to person, place, and time.      Motor: Weakness present.      Coordination: Coordination abnormal.         Fluids    Intake/Output Summary (Last 24 hours) at 2/1/2022 0738  Last data filed at 1/31/2022 1500  Gross per 24 hour   Intake 214.4 ml   Output 150 ml   Net 64.4 ml       Laboratory  Recent Labs     01/30/22  0200 01/31/22  0630   WBC 11.9* 10.2   RBC 4.31* 4.53*   HEMOGLOBIN 13.8* 15.0   HEMATOCRIT 41.0* 42.2   MCV 95.1 93.2   MCH 32.0 33.1*   MCHC 33.7 35.5*   RDW 45.3 44.3   PLATELETCT 374 313   MPV 9.2 8.9*     Recent Labs     01/30/22  0200 01/31/22  0630   SODIUM 136 135    POTASSIUM 4.0 3.9   CHLORIDE 104 102   CO2 21 23   GLUCOSE 91 81   BUN 11 13   CREATININE 0.70 0.74   CALCIUM 8.1* 8.4*     Recent Labs     01/29/22  1158   APTT 40.5*   INR 1.23*               Imaging  DX-CHEST-PORTABLE (1 VIEW)   Final Result         1.  Patchy pulmonary infiltrates, greater on the right. Similar compared to prior study.      CT-CTA HEAD WITH & W/O-POST PROCESS   Final Result         1.  Previously visualized partial occlusion of right M1 segment has resolved status post thrombectomy.   2.  Persistent occlusion of the right distal MCA branch vessel supplying area of infarct, similar to prior study.   3.  Evolving infarct in the right parietotemporal adnexal lobe region.   4.  Stable hyperdensities in the right sylvian fissure and sulci of the right parietotemporal lobe likely stable hemorrhages.      EC-ECHOCARDIOGRAM LTD W/ CONT   Final Result      IR-THROMBO MECHANICAL ARTERY,INIT   Final Result      1.  Filling defects at the inferior M2 segment and fetal right posterior cerebral artery.   2.  Spontaneous resolution of inferior M2 segment filling defect.   3.  Mechanical thrombectomy of fetal posterior cerebral artery using RED 62. Final angiogram demonstrates patent blood vessels indicating TICI 2b flow.   4.  Successful right internal jugular central venous catheter placement.      IR-APPLE,GROSHONG PLACEMENT >5   Final Result      1.  Filling defects at the inferior M2 segment and fetal right posterior cerebral artery.   2.  Spontaneous resolution of inferior M2 segment filling defect.   3.  Mechanical thrombectomy of fetal posterior cerebral artery using RED 62. Final angiogram demonstrates patent blood vessels indicating TICI 2b flow.   4.  Successful right internal jugular central venous catheter placement.      DX-CHEST-PORTABLE (1 VIEW)   Final Result      1.  Right central line projects over the SVC.   2.  No pneumothorax.   3.  Bilateral airspace opacities compatible with  multifocal Covid 19 pneumonia which are not significantly changed.      CT-CEREBRAL PERFUSION ANALYSIS   Final Result      1.  Cerebral blood flow less than 30% likely representing completed infarct = 62 mL.      2.  T Max more than 6 seconds likely representing combination of completed infarct and ischemia = 76 mL.      3.  Mismatched volume likely representing ischemic brain/penumbra = 14 mL      4.  Please note that the cerebral perfusion was performed on the limited brain tissue around the basal ganglia region. Infarct/ischemia outside the CT perfusion sections can be missed in this study.      CT-CTA HEAD WITH & W/O-POST PROCESS   Final Result      Subacute right parieto-occipital infarction without hemorrhagic transformation. The MCA branch vessel supplying this again show contrast cut off      Left thalamic vasogenic edema and subacute hemorrhage exerts mild mass effect, slightly worse than on comparison      New eccentric nonocclusive filling defects in the distal right internal carotid and M1 segments concerning for small thrombus favored over spasm      Similar distal right vertebral artery segmental narrowing may be from spasm or chronic. No focal occlusion and the left vertebral artery is dominant with normal vertebrobasilar confluence      MR-BRAIN-W/O   Final Result      1.  Multifocal areas of acute infarcts in the right frontal, temporal and parietal lobes. . The gradient echo images demonstrates linear area of magnetic susceptibility artifact at the insular cortex likely representing small area hemorrhagic    transformation or thrombus within the vessels.There are petechial microhemorrhages in the right medial posterior temporal lobe.   2.  There is an area of subacute hemorrhage with surrounding white matter edema in the left thalamus. This finding is noted on the previous CT scan dated 1/27/2022.   3.  Mild cerebral volume loss.   4.  Mild chronic microvascular ischemic disease.      CT-HEAD W/O    Final Result      1. Developing hypodensity in the right temporal occipital region consistent with acute infarct.   2. Tiny foci of hemorrhage in the right insular ribbon and possible tiny foci of subarachnoid hemorrhage or contrast staining in the right parietal and temporo-occipital regions.      These findings were messaged via Voal[employer] with JEREMY M GONDA on 1/28/2022 11:58 AM.            US-EXTREMITY VENOUS LOWER BILAT   Final Result      DX-CHEST-PORTABLE (1 VIEW)   Final Result      Patchy airspace process consistent with edema or atelectasis      IR-THROMBO MECHANICAL ARTERY,INIT   Final Result      1.  Occluded inferior M2, superior M2 and some of the M3 branches.   2.  The fetal right PCA demonstrates thrombus at the middle of the procedure.   3.  Aspiration thrombectomy- inferior M2-RED 62-patent branches.   4.  Aspiration thrombectomy-superior M2-RED 62-patent branches.   5.  Aspiration thrombectomy-fetal right posterior cerebral artery-RED 62-patent branches.   6.  The final TICI score:2b      CT-CTA NECK WITH & W/O-POST PROCESSING   Final Result   Addendum 1 of 1   Addendum:   There is a small nonocclusive arterial thrombus within the right    innominate artery. This is seen on series 9 image 116.      These findings were discussed with REMY CARRERO by Dr. Rock on    1/27/2022.      Final      1.  No hemodynamically significant stenosis of the neck arteries.   2.  Bilateral ill-defined pulmonary opacities in keeping with Covid pneumonia.      CT-CTA HEAD WITH & W/O-POST PROCESS   Final Result      1.  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            Comment: Results discussed with Dr. Carrero at approximately 3:35 PM      CT-CEREBRAL PERFUSION ANALYSIS   Final Result      1.  Nondiagnostic scan secondary to patient motion.      CT-HEAD W/O   Final Result         NO ACUTE ABNORMALITIES ARE NOTED ON CT SCAN OF THE HEAD.                    Assessment/Plan  * Acute  ischemic stroke (HCC)- (present on admission)  Assessment & Plan  Atorvastatin 80mg q HS  Heparin drip.  Transitioning to Eliquis  BP monitoring  Neurochecks closely while on heparin drip and MRI finding of hemorrhage.  Decreased to every 4 hours, transfer to neurology  Neurology consulting.  Aspiration precautions  ST/PT/OT  Physiatry consulting    Type 2 diabetes mellitus with neurologic complication, without long-term current use of insulin (HCC)- (present on admission)  Assessment & Plan  1/29 A1c:6.6  Monitor accuchecks.  SSI if needed  Diabetic diet.    COVID-19  Assessment & Plan  Isolation precautions.  Supportive care     Plan  Transition to Eliquis  Continued efforts with PT OT speech therapy  GDMT, blood pressure control  Anticoagulation  Await rehab decisions  See orders  Medically complex high risk patient    VTE prophylaxis: therapeutic anticoagulation with Heparin    I have performed a physical exam and reviewed and updated ROS and Plan today (2/1/2022). In review of yesterday's note (1/31/2022), there are no changes except as documented above.      Please note that this dictation was created using voice recognition software. I have made every reasonable attempt to correct obvious errors, but I expect that there are errors of grammar and possibly context that I did not discover before finalizing the note.

## 2022-02-01 NOTE — DISCHARGE PLANNING
Care Transition Team Discharge Planning    Anticipated Discharge Disposition: transfer for rehab once medically stable    Action: Lsw noted pt will stop being followed by Rehab admissions.    Lsw noted Rehab MD stated in earlier note 1/28/22 barrier is covid positive situation.  Lsw further noted all 3 therapy disciplines agree pt is appriopriate for acute therapy transfer: PT on 1/31/ 22 , Ot on 1/31/22, and SLP on 1/30/22.    Lsw sent text to admissions Emmanuel to question other than CV plus, what other barriers exist.     Barriers to Discharge: covid positive    Plan: HCM will continue to follow, and assist w/ d/c planning.

## 2022-02-01 NOTE — PROGRESS NOTES
4 Eyes Skin Assessment Completed by Haresh, RN and Wilbert, RN.    Head WDL  Ears WDL  Nose WDL  Mouth WDL  Neck WDL  Breast/Chest WDL  Shoulder Blades WDL  Spine WDL  (R) Arm/Elbow/Hand WDL  (L) Arm/Elbow/Hand WDL  Abdomen Incision  Groin WDL  Scrotum/Coccyx/Buttocks WDL  (R) Leg Bruising BKA  (L) Leg WDL  (R) Heel/Foot/Toe BKA  (L) Heel/Foot/Toe WDL          Devices In Places ECG, Tele Box, Blood Pressure Cuff and Pulse Ox      Interventions In Place Gray Ear Foams    Possible Skin Injury No    Pictures Uploaded Into Epic N/A  Wound Consult Placed N/A  RN Wound Prevention Protocol Ordered No

## 2022-02-02 LAB
ANION GAP SERPL CALC-SCNC: 10 MMOL/L (ref 7–16)
BUN SERPL-MCNC: 13 MG/DL (ref 8–22)
CALCIUM SERPL-MCNC: 8.8 MG/DL (ref 8.5–10.5)
CHLORIDE SERPL-SCNC: 103 MMOL/L (ref 96–112)
CO2 SERPL-SCNC: 23 MMOL/L (ref 20–33)
CREAT SERPL-MCNC: 0.82 MG/DL (ref 0.5–1.4)
ERYTHROCYTE [DISTWIDTH] IN BLOOD BY AUTOMATED COUNT: 45 FL (ref 35.9–50)
GLUCOSE SERPL-MCNC: 94 MG/DL (ref 65–99)
HCT VFR BLD AUTO: 43 % (ref 42–52)
HGB BLD-MCNC: 15 G/DL (ref 14–18)
MAGNESIUM SERPL-MCNC: 2.2 MG/DL (ref 1.5–2.5)
MCH RBC QN AUTO: 32.9 PG (ref 27–33)
MCHC RBC AUTO-ENTMCNC: 34.9 G/DL (ref 33.7–35.3)
MCV RBC AUTO: 94.3 FL (ref 81.4–97.8)
PHOSPHATE SERPL-MCNC: 2.5 MG/DL (ref 2.5–4.5)
PLATELET # BLD AUTO: 284 K/UL (ref 164–446)
PMV BLD AUTO: 9 FL (ref 9–12.9)
POTASSIUM SERPL-SCNC: 3.9 MMOL/L (ref 3.6–5.5)
RBC # BLD AUTO: 4.56 M/UL (ref 4.7–6.1)
SODIUM SERPL-SCNC: 136 MMOL/L (ref 135–145)
UFH PPP CHRO-ACNC: 0.41 IU/ML
WBC # BLD AUTO: 11 K/UL (ref 4.8–10.8)

## 2022-02-02 PROCEDURE — 85027 COMPLETE CBC AUTOMATED: CPT

## 2022-02-02 PROCEDURE — 700102 HCHG RX REV CODE 250 W/ 637 OVERRIDE(OP): Performed by: HOSPITALIST

## 2022-02-02 PROCEDURE — 84100 ASSAY OF PHOSPHORUS: CPT

## 2022-02-02 PROCEDURE — 80048 BASIC METABOLIC PNL TOTAL CA: CPT

## 2022-02-02 PROCEDURE — 83735 ASSAY OF MAGNESIUM: CPT

## 2022-02-02 PROCEDURE — 99232 SBSQ HOSP IP/OBS MODERATE 35: CPT | Performed by: PSYCHIATRY & NEUROLOGY

## 2022-02-02 PROCEDURE — A9270 NON-COVERED ITEM OR SERVICE: HCPCS | Performed by: NURSE PRACTITIONER

## 2022-02-02 PROCEDURE — A9270 NON-COVERED ITEM OR SERVICE: HCPCS | Performed by: HOSPITALIST

## 2022-02-02 PROCEDURE — 700102 HCHG RX REV CODE 250 W/ 637 OVERRIDE(OP): Performed by: NURSE PRACTITIONER

## 2022-02-02 PROCEDURE — 99233 SBSQ HOSP IP/OBS HIGH 50: CPT | Performed by: HOSPITALIST

## 2022-02-02 PROCEDURE — 770001 HCHG ROOM/CARE - MED/SURG/GYN PRIV*

## 2022-02-02 RX ADMIN — ATORVASTATIN CALCIUM 80 MG: 40 TABLET, FILM COATED ORAL at 16:49

## 2022-02-02 RX ADMIN — APIXABAN 5 MG: 5 TABLET, FILM COATED ORAL at 05:39

## 2022-02-02 RX ADMIN — DOCUSATE SODIUM 50 MG AND SENNOSIDES 8.6 MG 2 TABLET: 8.6; 5 TABLET, FILM COATED ORAL at 16:49

## 2022-02-02 RX ADMIN — DOCUSATE SODIUM 50 MG AND SENNOSIDES 8.6 MG 2 TABLET: 8.6; 5 TABLET, FILM COATED ORAL at 05:39

## 2022-02-02 RX ADMIN — APIXABAN 5 MG: 5 TABLET, FILM COATED ORAL at 16:49

## 2022-02-02 ASSESSMENT — ENCOUNTER SYMPTOMS
SPEECH CHANGE: 1
FOCAL WEAKNESS: 1
EYES NEGATIVE: 1
GASTROINTESTINAL NEGATIVE: 1
NERVOUS/ANXIOUS: 1
MUSCULOSKELETAL NEGATIVE: 1
RESPIRATORY NEGATIVE: 1
CARDIOVASCULAR NEGATIVE: 1

## 2022-02-02 ASSESSMENT — PAIN DESCRIPTION - PAIN TYPE
TYPE: ACUTE PAIN

## 2022-02-02 NOTE — CARE PLAN
The patient is Watcher - Medium risk of patient condition declining or worsening    Shift Goals  Clinical Goals: maintain neuro status  Patient Goals: rest  Family Goals: Improvement    Progress made toward(s) clinical / shift goals:  neuro stability/rest      Problem: Optimal Care of the Stroke Patient  Goal: Optimal emergency care for the stroke patient  Outcome: Progressing  Goal: Optimal acute care for the stroke patient  Outcome: Progressing     Problem: Knowledge Deficit - Stroke Education  Goal: Patient's knowledge of stroke and risk factors will improve  Outcome: Progressing     Problem: Psychosocial - Patient Condition  Goal: Patient's ability to verbalize feelings about condition will improve  Outcome: Progressing  Goal: Patient's ability to re-evaluate and adapt role responsibilities will improve  Outcome: Progressing     Problem: Discharge Planning - Stroke  Goal: Ensure Stroke Core Measures are met prior to discharge  Outcome: Progressing  Goal: Patient’s continuum of care needs will be met  Outcome: Progressing     Problem: Neuro Status  Goal: Neuro status will remain stable or improve  Outcome: Progressing     Problem: Hemodynamic Monitoring  Goal: Patient's hemodynamics, fluid balance and neurologic status will be stable or improve  Outcome: Progressing     Problem: Respiratory - Stroke Patient  Goal: Patient will achieve/maintain optimum respiratory rate/effort  Outcome: Progressing     Problem: Dysphagia  Goal: Dysphagia will improve  Outcome: Progressing     Problem: Risk for Aspiration  Goal: Patient's risk for aspiration will be absent or decrease  Outcome: Progressing     Problem: Urinary Elimination  Goal: Establish and maintain regular urinary output  Outcome: Progressing     Problem: Bowel Elimination  Goal: Establish and maintain regular bowel function  Outcome: Progressing     Problem: Mobility - Stroke  Goal: Patient's capacity to carry out activities will improve  Outcome:  Progressing  Goal: Spasticity will be prevented or improved  Outcome: Progressing  Goal: Subluxation will be prevented or improved  Outcome: Progressing     Problem: Self Care  Goal: Patient will have the ability to perform ADLs independently or with assistance (bathe, groom, dress, toilet and feed)  Outcome: Progressing     Problem: Knowledge Deficit - Standard  Goal: Patient and family/care givers will demonstrate understanding of plan of care, disease process/condition, diagnostic tests and medications  Outcome: Progressing     Problem: Skin Integrity  Goal: Skin integrity is maintained or improved  Outcome: Progressing     Problem: Fall Risk  Goal: Patient will remain free from falls  Outcome: Progressing     Problem: Pain - Standard  Goal: Alleviation of pain or a reduction in pain to the patient’s comfort goal  Outcome: Progressing       Patient is not progressing towards the following goals:

## 2022-02-02 NOTE — PROGRESS NOTES
4 Eyes Skin Assessment Completed by SEBAS Lucas and SEBAS Zapata.    Head WDL  Ears Redness and Blanching  Nose WDL  Mouth WDL  Neck WDL  Breast/Chest WDL  Shoulder Blades WDL  Spine WDL  (R) Arm/Elbow/Hand Bruising  (L) Arm/Elbow/Hand Bruising  Abdomen WDL  Groin Redness, Bruising and Incision closed with dermabond  Scrotum/Coccyx/Buttocks Redness and Blanching  (R) Leg Scar, BKA  (L) Leg WDL  (R) Heel/Foot/Toe n/a BKA  (L) Heel/Foot/Toe WDL          Devices In Places Pulse Ox and Nasal Cannula      Interventions In Place NC W/Ear Foams, Sacral Mepilex, Pillows, Q2 Turns and Low Air Loss Mattress    Possible Skin Injury No    Pictures Uploaded Into Epic N/A  Wound Consult Placed N/A  RN Wound Prevention Protocol Ordered Yes

## 2022-02-02 NOTE — CARE PLAN
The patient is Watcher - Medium risk of patient condition declining or worsening    Shift Goals  Clinical Goals: Maintain BP <140  Patient Goals: Rest  Family Goals: Improvement    Progress made toward(s) clinical / shift goals:    Problem: Neuro Status  Goal: Neuro status will remain stable or improve  Outcome: Progressing q4 neuro checks     Problem: Respiratory - Stroke Patient  Goal: Patient will achieve/maintain optimum respiratory rate/effort  Outcome: Progressing decreased pt to 1L NC       Patient is not progressing towards the following goals:

## 2022-02-02 NOTE — PROGRESS NOTES
Hospital Medicine Daily Progress Note    Date of Service  2/2/2022    Chief Complaint  Geoff Scott is a 66 y.o. male admitted 1/27/2022 with an acute CVA    Hospital Course  This is a unfortunate 66-year-old male with a history of right below-knee amputation, prior stroke, presented on 1/27, COVID-19 positive, acute onset of left-sided weakness and dysarthria, the patient initially received IV TPA and underwent endovascular clot retrieval, he was noted to have hemorrhagic transformation of the stroke, on 1/29 had acute neuro changes again with right gaze deviation and worsening dysarthria, the patient taken back to the IR suite for additional thrombectomy of the M2 and P1 segment, started on a heparin drip.  Neurology consulting, the patient be continued  Left-sided  weakness, left facial droop and dysarthria.    Interval Problem Update  Patient seen and examined today.    Patient tolerating treatment and therapies.  All Data, Medication data reviewed.  Case discussed with nursing as available.  Plan of Care reviewed with patient and notified of changes.  2/1 the patient continues to be weaker on the left, he requires assistance for feeding, he is currently afebrile, heart rate in 90s, he is on 2 L nasal cannula oxygen, blood pressure 120/76,  Laboratory data is reviewed, fairly benign, continues on a heparin drip with transition to Eliquis as per neurology tomorrow.  The patient will likely require acute rehab versus skilled nursing facility type rehab.  He is afebrile, breathing unlabored, 2 L nasal cannula oxygen, normotensive, laboratory data reviewed  2/2 the patient appears to be feeling better, he wants better food he states, laboratory data reviewed, transitioning from heparin to NOAC.  Still with right gaze preference, left-sided neglect, the patient is moving all 4 extremities better, left facial droop.    I have personally seen and examined the patient at bedside. I discussed the plan of care with  patient.    Consultants/Specialty  neurology    Code Status  Full Code    Disposition  Patient is not medically cleared for discharge.   Anticipate discharge to to skilled nursing facility.  Versus acute rehab  I have placed the appropriate orders for post-discharge needs.    Review of Systems  Review of Systems   Constitutional: Positive for malaise/fatigue.   HENT: Negative.    Eyes: Negative.    Respiratory: Negative.    Cardiovascular: Negative.    Gastrointestinal: Negative.    Genitourinary: Negative.    Musculoskeletal: Negative.    Skin: Negative.    Neurological: Positive for focal weakness.   Endo/Heme/Allergies: Negative.    Psychiatric/Behavioral: The patient is nervous/anxious.    All other systems reviewed and are negative.       Physical Exam  Temp:  [36.3 °C (97.4 °F)-36.6 °C (97.9 °F)] 36.3 °C (97.4 °F)  Pulse:  [66-99] 76  Resp:  [14-24] 21  BP: (102-138)/(63-76) 102/75  SpO2:  [94 %-97 %] 95 %    Physical Exam  Vitals and nursing note reviewed.   Constitutional:       Appearance: He is well-developed.   HENT:      Head: Normocephalic.      Comments: Left facial droop  Eyes:      Pupils: Pupils are equal, round, and reactive to light.      Comments: Right gaze   Cardiovascular:      Rate and Rhythm: Normal rate and regular rhythm.      Heart sounds: Normal heart sounds.   Pulmonary:      Effort: Pulmonary effort is normal.   Abdominal:      General: Bowel sounds are normal.      Palpations: Abdomen is soft.   Genitourinary:     Penis: Normal.       Rectum: Normal.   Musculoskeletal:         General: Normal range of motion.      Cervical back: Normal range of motion.      Comments: Right BKA   Skin:     General: Skin is warm.   Neurological:      Mental Status: He is alert.      Motor: Weakness present.      Coordination: Coordination abnormal.   Psychiatric:         Mood and Affect: Mood normal.         Fluids    Intake/Output Summary (Last 24 hours) at 2/2/2022 0754  Last data filed at 2/2/2022  0440  Gross per 24 hour   Intake 650.97 ml   Output 1350 ml   Net -699.03 ml       Laboratory  Recent Labs     01/31/22  0630 02/02/22  0430   WBC 10.2 11.0*   RBC 4.53* 4.56*   HEMOGLOBIN 15.0 15.0   HEMATOCRIT 42.2 43.0   MCV 93.2 94.3   MCH 33.1* 32.9   MCHC 35.5* 34.9   RDW 44.3 45.0   PLATELETCT 313 284   MPV 8.9* 9.0     Recent Labs     01/31/22  0630 02/02/22  0430   SODIUM 135 136   POTASSIUM 3.9 3.9   CHLORIDE 102 103   CO2 23 23   GLUCOSE 81 94   BUN 13 13   CREATININE 0.74 0.82   CALCIUM 8.4* 8.8                   Imaging  DX-CHEST-PORTABLE (1 VIEW)   Final Result         1.  Patchy pulmonary infiltrates, greater on the right. Similar compared to prior study.      CT-CTA HEAD WITH & W/O-POST PROCESS   Final Result         1.  Previously visualized partial occlusion of right M1 segment has resolved status post thrombectomy.   2.  Persistent occlusion of the right distal MCA branch vessel supplying area of infarct, similar to prior study.   3.  Evolving infarct in the right parietotemporal adnexal lobe region.   4.  Stable hyperdensities in the right sylvian fissure and sulci of the right parietotemporal lobe likely stable hemorrhages.      EC-ECHOCARDIOGRAM LTD W/ CONT   Final Result      IR-THROMBO MECHANICAL ARTERY,INIT   Final Result      1.  Filling defects at the inferior M2 segment and fetal right posterior cerebral artery.   2.  Spontaneous resolution of inferior M2 segment filling defect.   3.  Mechanical thrombectomy of fetal posterior cerebral artery using RED 62. Final angiogram demonstrates patent blood vessels indicating TICI 2b flow.   4.  Successful right internal jugular central venous catheter placement.      IR-SERAFIN APPLE PLACEMENT >5   Final Result      1.  Filling defects at the inferior M2 segment and fetal right posterior cerebral artery.   2.  Spontaneous resolution of inferior M2 segment filling defect.   3.  Mechanical thrombectomy of fetal posterior cerebral artery using RED  62. Final angiogram demonstrates patent blood vessels indicating TICI 2b flow.   4.  Successful right internal jugular central venous catheter placement.      DX-CHEST-PORTABLE (1 VIEW)   Final Result      1.  Right central line projects over the SVC.   2.  No pneumothorax.   3.  Bilateral airspace opacities compatible with multifocal Covid 19 pneumonia which are not significantly changed.      CT-CEREBRAL PERFUSION ANALYSIS   Final Result      1.  Cerebral blood flow less than 30% likely representing completed infarct = 62 mL.      2.  T Max more than 6 seconds likely representing combination of completed infarct and ischemia = 76 mL.      3.  Mismatched volume likely representing ischemic brain/penumbra = 14 mL      4.  Please note that the cerebral perfusion was performed on the limited brain tissue around the basal ganglia region. Infarct/ischemia outside the CT perfusion sections can be missed in this study.      CT-CTA HEAD WITH & W/O-POST PROCESS   Final Result      Subacute right parieto-occipital infarction without hemorrhagic transformation. The MCA branch vessel supplying this again show contrast cut off      Left thalamic vasogenic edema and subacute hemorrhage exerts mild mass effect, slightly worse than on comparison      New eccentric nonocclusive filling defects in the distal right internal carotid and M1 segments concerning for small thrombus favored over spasm      Similar distal right vertebral artery segmental narrowing may be from spasm or chronic. No focal occlusion and the left vertebral artery is dominant with normal vertebrobasilar confluence      MR-BRAIN-W/O   Final Result      1.  Multifocal areas of acute infarcts in the right frontal, temporal and parietal lobes. . The gradient echo images demonstrates linear area of magnetic susceptibility artifact at the insular cortex likely representing small area hemorrhagic    transformation or thrombus within the vessels.There are petechial  microhemorrhages in the right medial posterior temporal lobe.   2.  There is an area of subacute hemorrhage with surrounding white matter edema in the left thalamus. This finding is noted on the previous CT scan dated 1/27/2022.   3.  Mild cerebral volume loss.   4.  Mild chronic microvascular ischemic disease.      CT-HEAD W/O   Final Result      1. Developing hypodensity in the right temporal occipital region consistent with acute infarct.   2. Tiny foci of hemorrhage in the right insular ribbon and possible tiny foci of subarachnoid hemorrhage or contrast staining in the right parietal and temporo-occipital regions.      These findings were messaged via Voalte with JEREMY M GONDA on 1/28/2022 11:58 AM.            US-EXTREMITY VENOUS LOWER BILAT   Final Result      DX-CHEST-PORTABLE (1 VIEW)   Final Result      Patchy airspace process consistent with edema or atelectasis      IR-THROMBO MECHANICAL ARTERY,INIT   Final Result      1.  Occluded inferior M2, superior M2 and some of the M3 branches.   2.  The fetal right PCA demonstrates thrombus at the middle of the procedure.   3.  Aspiration thrombectomy- inferior M2-RED 62-patent branches.   4.  Aspiration thrombectomy-superior M2-RED 62-patent branches.   5.  Aspiration thrombectomy-fetal right posterior cerebral artery-RED 62-patent branches.   6.  The final TICI score:2b      CT-CTA NECK WITH & W/O-POST PROCESSING   Final Result   Addendum 1 of 1   Addendum:   There is a small nonocclusive arterial thrombus within the right    innominate artery. This is seen on series 9 image 116.      These findings were discussed with REMY CARRERO by Dr. Rock on    1/27/2022.      Final      1.  No hemodynamically significant stenosis of the neck arteries.   2.  Bilateral ill-defined pulmonary opacities in keeping with Covid pneumonia.      CT-CTA HEAD WITH & W/O-POST PROCESS   Final Result      1.  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             Comment: Results discussed with Dr. Beyer at approximately 3:35 PM      CT-CEREBRAL PERFUSION ANALYSIS   Final Result      1.  Nondiagnostic scan secondary to patient motion.      CT-HEAD W/O   Final Result         NO ACUTE ABNORMALITIES ARE NOTED ON CT SCAN OF THE HEAD.                    Assessment/Plan  * Acute ischemic stroke (HCC)- (present on admission)  Assessment & Plan  Atorvastatin 80mg q HS  Now on Eliquis  BP monitoring  Neurochecks closely while on heparin drip and MRI finding of hemorrhage.  Decreased to every 4 hours, transfer to neurology  Neurology consulting.  Aspiration precautions  ST/PT/OT  Physiatry consulting    Type 2 diabetes mellitus with neurologic complication, without long-term current use of insulin (HCC)- (present on admission)  Assessment & Plan  1/29 A1c:6.6  Monitor accuchecks.  SSI if needed  Diabetic diet.    COVID-19  Assessment & Plan  Isolation precautions.  Supportive care     Plan  Transitioned to Eliquis  Continued efforts with PT OT speech therapy  GDMT, blood pressure control  Discharge planning, complicated by positive COVID-19 status  See orders  Medically complex high risk patient    VTE prophylaxis: therapeutic anticoagulation with Heparin    I have performed a physical exam and reviewed and updated ROS and Plan today (2/2/2022). In review of yesterday's note (2/1/2022), there are no changes except as documented above.      Please note that this dictation was created using voice recognition software. I have made every reasonable attempt to correct obvious errors, but I expect that there are errors of grammar and possibly context that I did not discover before finalizing the note.

## 2022-02-02 NOTE — CARE PLAN
Problem: Nutritional:  Goal: Achieve adequate nutritional intake  Description: Patient will consume >50% of meals  Outcome: Progressing     Pt with improving PO intake. Intake was <25%, now improving to 25-50%. Will plan to order Boost Glucose Control supplements TID to promote intake and help bolster nutrition.     RD following.

## 2022-02-02 NOTE — PROGRESS NOTES
Neurology Progress Note  Neurohospitalist Service, [facility]    Referring Physician: Keron White M.D.    Chief Complaint   Patient presents with   • Possible Stroke     Onset at 1400, R sided gaze deviation & L sided facial droop, L sided arm & leg drift. BG 98 ons scene with REMSA. IV placed en route, no thinners.       HPI: Refer to initial documented Neurology H&P, as detailed in the patient's chart.    Interval History 2/2:  No new issues    Review of systems: In addition to what is detailed in the HPI and/or updated in the interval history, all other systems reviewed and are negative.    Past Medical History:    has a past medical history of right BKA (HCC) and Stroke (HCC).    FHx:  family history is not on file.    SHx:   reports that he has been smoking. He has never used smokeless tobacco. He reports current drug use. Drug: Inhaled. He reports that he does not drink alcohol.    Medications:    Current Facility-Administered Medications:   •  apixaban (ELIQUIS) tablet 5 mg, 5 mg, Oral, BID, Keron White M.D., 5 mg at 02/02/22 0539  •  acetaminophen (Tylenol) tablet 650 mg, 650 mg, Oral, Q6HRS PRN, Camila Araujo, A.P.R.N.  •  atorvastatin (LIPITOR) tablet 80 mg, 80 mg, Oral, Q EVENING, Camila Araujo, A.P.R.N., 80 mg at 02/01/22 1713  •  ondansetron (ZOFRAN ODT) dispertab 4 mg, 4 mg, Oral, Q4HRS PRN, Camila Araujo, A.P.R.N.  •  senna-docusate (PERICOLACE or SENOKOT S) 8.6-50 MG per tablet 2 Tablet, 2 Tablet, Oral, BID, 2 Tablet at 02/02/22 0539 **AND** polyethylene glycol/lytes (MIRALAX) PACKET 1 Packet, 1 Packet, Oral, QDAY PRN **AND** magnesium hydroxide (MILK OF MAGNESIA) suspension 30 mL, 30 mL, Oral, QDAY PRN **AND** bisacodyl (DULCOLAX) suppository 10 mg, 10 mg, Rectal, QDAY PRN, Camila Araujo, URMILA.P.R.N.  •  amLODIPine (NORVASC) tablet 5 mg, 5 mg, Oral, Q DAY, URMILA Matson.P.R.N., 5 mg at 01/31/22 0631  •  hydrALAZINE (APRESOLINE) injection 10-20  mg, 10-20 mg, Intravenous, Q2HRS PRN, BOZENA RossiP.RROSA.  •  labetalol (NORMODYNE/TRANDATE) injection 10 mg, 10 mg, Intravenous, Q10 MIN PRN, BOZENA RossiP.RROSA.  •  ondansetron (ZOFRAN) syringe/vial injection 4 mg, 4 mg, Intravenous, Q4HRS PRN, Alec Block M.D.    Physical Examination:     Vitals:    02/02/22 0300 02/02/22 0400 02/02/22 0600 02/02/22 0800   BP: 103/70 123/73 102/75 121/80   Pulse: 71 81 76 67   Resp: 15 (!) 23 (!) 21 17   Temp:    36.5 °C (97.7 °F)   TempSrc:    Temporal   SpO2: 96% 95% 95% 94%   Weight:       Height:               NEUROLOGICAL EXAM:   Patient is awake and alert.  Following commands.  Has a dense right-sided gaze preference with neglect to the left side.  However is able to move all fours antigravity.  Has a left facial droop.  Appears have decreased vision on the left compared to right to threat.  Sensory loss on the left.    Objective Data:    Labs:  Lab Results   Component Value Date/Time    PROTHROMBTM 15.1 (H) 01/29/2022 11:58 AM    INR 1.23 (H) 01/29/2022 11:58 AM      Lab Results   Component Value Date/Time    WBC 11.0 (H) 02/02/2022 04:30 AM    RBC 4.56 (L) 02/02/2022 04:30 AM    HEMOGLOBIN 15.0 02/02/2022 04:30 AM    HEMATOCRIT 43.0 02/02/2022 04:30 AM    MCV 94.3 02/02/2022 04:30 AM    MCH 32.9 02/02/2022 04:30 AM    MCHC 34.9 02/02/2022 04:30 AM    MPV 9.0 02/02/2022 04:30 AM    NEUTSPOLYS 67.40 01/27/2022 03:08 PM    LYMPHOCYTES 22.20 01/27/2022 03:08 PM    MONOCYTES 9.70 01/27/2022 03:08 PM    EOSINOPHILS 0.30 01/27/2022 03:08 PM    BASOPHILS 0.10 01/27/2022 03:08 PM      Lab Results   Component Value Date/Time    SODIUM 136 02/02/2022 04:30 AM    POTASSIUM 3.9 02/02/2022 04:30 AM    CHLORIDE 103 02/02/2022 04:30 AM    CO2 23 02/02/2022 04:30 AM    GLUCOSE 94 02/02/2022 04:30 AM    BUN 13 02/02/2022 04:30 AM    CREATININE 0.82 02/02/2022 04:30 AM      Lab Results   Component Value Date/Time    CHOLSTRLTOT 183 01/28/2022 04:50 AM     (H)  01/28/2022 04:50 AM    HDL 23 (A) 01/28/2022 04:50 AM    TRIGLYCERIDE 128 01/28/2022 04:50 AM       Lab Results   Component Value Date/Time    ALKPHOSPHAT 100 (H) 01/29/2022 02:20 AM    ASTSGOT 24 01/29/2022 02:20 AM    ALTSGPT 30 01/29/2022 02:20 AM    TBILIRUBIN 0.9 01/29/2022 02:20 AM        Imaging/Testing:  DX-CHEST-PORTABLE (1 VIEW)   Final Result         1.  Patchy pulmonary infiltrates, greater on the right. Similar compared to prior study.      CT-CTA HEAD WITH & W/O-POST PROCESS   Final Result         1.  Previously visualized partial occlusion of right M1 segment has resolved status post thrombectomy.   2.  Persistent occlusion of the right distal MCA branch vessel supplying area of infarct, similar to prior study.   3.  Evolving infarct in the right parietotemporal adnexal lobe region.   4.  Stable hyperdensities in the right sylvian fissure and sulci of the right parietotemporal lobe likely stable hemorrhages.      EC-ECHOCARDIOGRAM LTD W/ CONT   Final Result      IR-THROMBO MECHANICAL ARTERY,INIT   Final Result      1.  Filling defects at the inferior M2 segment and fetal right posterior cerebral artery.   2.  Spontaneous resolution of inferior M2 segment filling defect.   3.  Mechanical thrombectomy of fetal posterior cerebral artery using RED 62. Final angiogram demonstrates patent blood vessels indicating TICI 2b flow.   4.  Successful right internal jugular central venous catheter placement.      IR-APPLE,GROSHONG PLACEMENT >5   Final Result      1.  Filling defects at the inferior M2 segment and fetal right posterior cerebral artery.   2.  Spontaneous resolution of inferior M2 segment filling defect.   3.  Mechanical thrombectomy of fetal posterior cerebral artery using RED 62. Final angiogram demonstrates patent blood vessels indicating TICI 2b flow.   4.  Successful right internal jugular central venous catheter placement.      DX-CHEST-PORTABLE (1 VIEW)   Final Result      1.  Right central  line projects over the SVC.   2.  No pneumothorax.   3.  Bilateral airspace opacities compatible with multifocal Covid 19 pneumonia which are not significantly changed.      CT-CEREBRAL PERFUSION ANALYSIS   Final Result      1.  Cerebral blood flow less than 30% likely representing completed infarct = 62 mL.      2.  T Max more than 6 seconds likely representing combination of completed infarct and ischemia = 76 mL.      3.  Mismatched volume likely representing ischemic brain/penumbra = 14 mL      4.  Please note that the cerebral perfusion was performed on the limited brain tissue around the basal ganglia region. Infarct/ischemia outside the CT perfusion sections can be missed in this study.      CT-CTA HEAD WITH & W/O-POST PROCESS   Final Result      Subacute right parieto-occipital infarction without hemorrhagic transformation. The MCA branch vessel supplying this again show contrast cut off      Left thalamic vasogenic edema and subacute hemorrhage exerts mild mass effect, slightly worse than on comparison      New eccentric nonocclusive filling defects in the distal right internal carotid and M1 segments concerning for small thrombus favored over spasm      Similar distal right vertebral artery segmental narrowing may be from spasm or chronic. No focal occlusion and the left vertebral artery is dominant with normal vertebrobasilar confluence      MR-BRAIN-W/O   Final Result      1.  Multifocal areas of acute infarcts in the right frontal, temporal and parietal lobes. . The gradient echo images demonstrates linear area of magnetic susceptibility artifact at the insular cortex likely representing small area hemorrhagic    transformation or thrombus within the vessels.There are petechial microhemorrhages in the right medial posterior temporal lobe.   2.  There is an area of subacute hemorrhage with surrounding white matter edema in the left thalamus. This finding is noted on the previous CT scan dated 1/27/2022.    3.  Mild cerebral volume loss.   4.  Mild chronic microvascular ischemic disease.      CT-HEAD W/O   Final Result      1. Developing hypodensity in the right temporal occipital region consistent with acute infarct.   2. Tiny foci of hemorrhage in the right insular ribbon and possible tiny foci of subarachnoid hemorrhage or contrast staining in the right parietal and temporo-occipital regions.      These findings were messaged via Voalte with CHARMAINE LEBLANC GONDA on 1/28/2022 11:58 AM.            US-EXTREMITY VENOUS LOWER BILAT   Final Result      DX-CHEST-PORTABLE (1 VIEW)   Final Result      Patchy airspace process consistent with edema or atelectasis      IR-THROMBO MECHANICAL ARTERY,INIT   Final Result      1.  Occluded inferior M2, superior M2 and some of the M3 branches.   2.  The fetal right PCA demonstrates thrombus at the middle of the procedure.   3.  Aspiration thrombectomy- inferior M2-RED 62-patent branches.   4.  Aspiration thrombectomy-superior M2-RED 62-patent branches.   5.  Aspiration thrombectomy-fetal right posterior cerebral artery-RED 62-patent branches.   6.  The final TICI score:2b      CT-CTA NECK WITH & W/O-POST PROCESSING   Final Result   Addendum 1 of 1   Addendum:   There is a small nonocclusive arterial thrombus within the right    innominate artery. This is seen on series 9 image 116.      These findings were discussed with REMY CARRERO by Dr. Rock on    1/27/2022.      Final      1.  No hemodynamically significant stenosis of the neck arteries.   2.  Bilateral ill-defined pulmonary opacities in keeping with Covid pneumonia.      CT-CTA HEAD WITH & W/O-POST PROCESS   Final Result      1.  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            Comment: Results discussed with Dr. Carrero at approximately 3:35 PM      CT-CEREBRAL PERFUSION ANALYSIS   Final Result      1.  Nondiagnostic scan secondary to patient motion.      CT-HEAD W/O   Final Result         NO  ACUTE ABNORMALITIES ARE NOTED ON CT SCAN OF THE HEAD.                     Assessment and Plan:    66-year-old male presenting with right MCA syndrome status post TPA in thrombectomy with a TICI score 2B.  The patient reclotted required a second thrombectomy on the 29th.  Patient continues to have left-sided neglect with preserved strength.  He is Covid positive, etiology strokes most likely to hypercoagulable state due to underlying Covid.  Currently on heparin.  Okay to discharge from the [facility].  Every 4 neurochecks.  Okay to start Eliquis starting tomorrow morning.  Discontinue heparin after the second dose.  PT and OT.  Most likely will need inpatient rehab.  Will need long-term cardiac monitoring at discharge.  Blood pressure goal should be 100-140 systolic.    Update 2/2  No new issues overnight.  Patient's been started on Eliquis already.  No issues so far.  No further recommendations from neurology service.  Continue with supportive care.  PT and OT.  Please call back if any issues arises.  Patient [facility].    The evaluation of the patient, and recommended management, was discussed with the resident staff. I have performed a physical exam and reviewed and updated ROS and Plan today (2/2/2022). In review of yesterday's note (2/1/2022), there are no changes except as documented above.    This chart was partially generated using voice recognition technology and sound alike word replacement may be present, best efforts were made to make the chart accurate.    Alec Santillan MD  Board Certified Neurology, ABPN  t) [phone]

## 2022-02-03 PROCEDURE — 99253 IP/OBS CNSLTJ NEW/EST LOW 45: CPT | Performed by: PHYSICAL MEDICINE & REHABILITATION

## 2022-02-03 PROCEDURE — 770001 HCHG ROOM/CARE - MED/SURG/GYN PRIV*

## 2022-02-03 PROCEDURE — 97116 GAIT TRAINING THERAPY: CPT | Mod: CQ

## 2022-02-03 PROCEDURE — 700102 HCHG RX REV CODE 250 W/ 637 OVERRIDE(OP): Performed by: HOSPITALIST

## 2022-02-03 PROCEDURE — 97535 SELF CARE MNGMENT TRAINING: CPT | Mod: CO

## 2022-02-03 PROCEDURE — A9270 NON-COVERED ITEM OR SERVICE: HCPCS | Performed by: HOSPITALIST

## 2022-02-03 PROCEDURE — 97530 THERAPEUTIC ACTIVITIES: CPT | Mod: CQ

## 2022-02-03 PROCEDURE — A9270 NON-COVERED ITEM OR SERVICE: HCPCS | Performed by: NURSE PRACTITIONER

## 2022-02-03 PROCEDURE — 99232 SBSQ HOSP IP/OBS MODERATE 35: CPT | Performed by: STUDENT IN AN ORGANIZED HEALTH CARE EDUCATION/TRAINING PROGRAM

## 2022-02-03 PROCEDURE — 700102 HCHG RX REV CODE 250 W/ 637 OVERRIDE(OP): Performed by: NURSE PRACTITIONER

## 2022-02-03 RX ADMIN — ATORVASTATIN CALCIUM 80 MG: 40 TABLET, FILM COATED ORAL at 16:37

## 2022-02-03 RX ADMIN — APIXABAN 5 MG: 5 TABLET, FILM COATED ORAL at 04:37

## 2022-02-03 RX ADMIN — APIXABAN 5 MG: 5 TABLET, FILM COATED ORAL at 16:37

## 2022-02-03 ASSESSMENT — COGNITIVE AND FUNCTIONAL STATUS - GENERAL
EATING MEALS: A LITTLE
DRESSING REGULAR UPPER BODY CLOTHING: A LOT
MOBILITY SCORE: 7
HELP NEEDED FOR BATHING: A LOT
TOILETING: A LOT
TURNING FROM BACK TO SIDE WHILE IN FLAT BAD: UNABLE
MOVING TO AND FROM BED TO CHAIR: UNABLE
DAILY ACTIVITIY SCORE: 14
PERSONAL GROOMING: A LITTLE
SUGGESTED CMS G CODE MODIFIER MOBILITY: CM
STANDING UP FROM CHAIR USING ARMS: A LOT
DRESSING REGULAR LOWER BODY CLOTHING: A LOT
MOVING FROM LYING ON BACK TO SITTING ON SIDE OF FLAT BED: UNABLE
CLIMB 3 TO 5 STEPS WITH RAILING: TOTAL
WALKING IN HOSPITAL ROOM: TOTAL
SUGGESTED CMS G CODE MODIFIER DAILY ACTIVITY: CK

## 2022-02-03 ASSESSMENT — ENCOUNTER SYMPTOMS
RESPIRATORY NEGATIVE: 1
SORE THROAT: 0
SPUTUM PRODUCTION: 0
DIZZINESS: 0
CARDIOVASCULAR NEGATIVE: 1
ABDOMINAL PAIN: 0
WEAKNESS: 1
HEADACHES: 0
GASTROINTESTINAL NEGATIVE: 1
FOCAL WEAKNESS: 1
SPEECH CHANGE: 1
CHILLS: 0
EYES NEGATIVE: 1
FEVER: 0
SHORTNESS OF BREATH: 0
NERVOUS/ANXIOUS: 0
CONSTIPATION: 0
DOUBLE VISION: 0
MYALGIAS: 0
BLURRED VISION: 0
PALPITATIONS: 0
DIARRHEA: 0
MUSCULOSKELETAL NEGATIVE: 1
NAUSEA: 0
VOMITING: 0
WHEEZING: 0
SINUS PAIN: 0
COUGH: 0

## 2022-02-03 ASSESSMENT — PAIN DESCRIPTION - PAIN TYPE
TYPE: ACUTE PAIN

## 2022-02-03 ASSESSMENT — GAIT ASSESSMENTS
DEVIATION: INCREASED BASE OF SUPPORT
GAIT LEVEL OF ASSIST: MINIMAL ASSIST
ASSISTIVE DEVICE: FRONT WHEEL WALKER
DISTANCE (FEET): 20

## 2022-02-03 NOTE — CONSULTS
Physical Medicine and Rehabilitation Consultation              Date of initial consultation: 2/3/2022  Requested by: Alec Block MD  Consulting physician: Jeancarlos Mcfarlane D.O.  Reason for consultation: assessment of rehabilitation needs  LOS: 7 Day(s)    Chief complaint: difficulty speaking, facial droop and left-sided weakness     This history was prepared after reviewing the patient's chart at [facility].    HPI: The patient is a 66 y.o. 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 The patient was last known well was an hour prior to presentation. +COVID19. He received IV-tPA and was taken to cath lab for endovascular clot retrieval.   He was noted to have hemorrhagic transformation of the stroke, on 1/29 had acute neuro changes again with right gaze deviation and worsening dysarthria, the patient taken back to the IR suite for additional thrombectomy of the M2 and P1 segment, started on a heparin drip.     The patient was found to have the following:  There is a small nonocclusive arterial thrombus within the right  innominate artery  ilateral ill-defined pulmonary opacities in keeping with Covid pneumonia.  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    The patient underwent the following procedures:   Left M2 Mechanical thrombectomies x3 by Dr. Genaro Sanchez MD, on 1/27/2022  Mechanical thrombectomy by Dr. Genaro Sanchez MD, on 1/29/2022    Physiatry was consulted to assess the patient's rehabilitation needs.    Patient reports: sleeping well. Having some urinary incontinence. Pain well controlled. Improvement in left arm movement but weak in hand. 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 His primary caretaker would be his adult son.    Goals for rehabilitation include: improving independence and going home safely    Social and functional history:  Prior to this hospitalization, patient was independent with ADLS and mobility without an assistive device. Patient lives with spouse and several grandchildren in a mobile home with 4 stairs to enter. He also has an alternative entry way with no steps to enter    Current function during therapy include:  Restrictions: none  PT: Functional mobility   Cognition    Cognition / Consciousness X   Speech/ Communication    (hypophonic)   Level of Consciousness Alert   Safety Awareness Impaired   Attention Impaired   Comments difficult to hear and understand, pleasant, cooperative, eager to participate   Passive ROM Lower Body   Passive ROM Lower Body WDL   Active ROM Lower Body    Active ROM Lower Body  WDL   Comments chronic R BKA; others WFL   Strength Lower Body   Lower Body Strength  WDL   Comments as above   Sensation Lower Body   Lower Extremity Sensation   Not Tested   Lower Body Muscle Tone   Lower Body Muscle Tone  WDL   Neurological Concerns   Neurological Concerns Yes   Comments given presentation and medical dx   Coordination Upper Body   Comments gross deficits LUE observed in function   Coordination Lower Body    Coordination Lower Body  WDL   Balance Assessment   Sitting Balance (Static) Fair -   Sitting Balance (Dynamic) Fair -   Standing Balance (Static) Poor -   Standing Balance (Dynamic) Trace +   Weight Shift Sitting Fair   Weight Shift Standing Poor   Comments CGA while sitting EOB; min to mod A during standing with R prosthesis and HHA x2   Gait Analysis   Gait Level Of Assist Unable to Participate   Weight Bearing Status no restrictions   Vision Deficits Impacting Mobility NT   Bed Mobility    Supine to Sit Moderate Assist   Sit to Supine    (NT, left in chair)   Scooting Minimal Assist  (seated)    Functional Mobility   Sit to Stand Moderate Assist   Bed, Chair, Wheelchair Transfer Moderate Assist   Transfer Method Stand Pivot       OT: Activities of daily living  Cognition    Cognition / Consciousness X   Speech/ Communication Delayed Responses;Slurred  (frequently difficult to understand)   Level of Consciousness Alert   Safety Awareness Impaired   Attention Impaired   Comments cooperative with therapy; motivated. difficult to understand even when asked to speak up; req tactile and v/cs for termination especially with RUE. Pt with L neglect req max v/cs to attend; limited ability to maintain   Passive ROM Upper Body   Passive ROM Upper Body X   Comments L elbow and shoulder; unclear if d/t tone or resistance to PROM   Active ROM Upper Body   Active ROM Upper Body  X   Dominant Hand Right   Comments LUE with weakness but shoulder about 70 degrees, and elbow to about 140 degrees. able to maintin wrist in neutral but no AROM in L hand   Strength Upper Body   Upper Body Strength  X   Lt Shoulder Flexion Strength 3- (F-)   Lt Elbow Flexion Strength 3- (F-)   Lt Wrist Flexion Strength 2 (P)   Left  Absent   Comments No AROM in L hand, neutral wrist. Req v/cs to initiate LUE movement in sitting   Sensation Upper Body   Upper Extremity Sensation  X   Comments no withdraw from pain on LUE in sitting   Upper Body Muscle Tone   Upper Body Muscle Tone  X   Lt Upper Extremity Muscle Tone Hypertonic  (elbow and shoulder; unclear if resisting d/t cognition)   Neurological Concerns   Neurological Concerns Yes   Sitting Posture During ADL's Posterior Lean;Lateral Lean Right  (initially with posterior lean but improved during session)   Lt Upper Extremity Functional Use Impaired  (no  strength)   Coordination Upper Body   Coordination X   Comments GM and FM impaired on RUE when reaching for therapist, and on L with minimal coordination   Balance Assessment   Sitting Balance (Static) Fair -   Sitting Balance  (Dynamic) Poor +   Standing Balance (Static) Poor -   Standing Balance (Dynamic) Trace +   Weight Shift Sitting Fair   Weight Shift Standing Poor   Comments CGA and intermittent min A at EOB; req mod A for BLEs/balance during txfs   Bed Mobility    Supine to Sit Moderate Assist   Sit to Supine    (left in chair)   Scooting Minimal Assist   Comments HOB flat   ADL Assessment   Grooming Minimal Assist;Seated  (wiping face )   Lower Body Dressing Maximal Assist  (don prosthesis and L sock)   Toileting Maximal Assist  (lance and bedpan)   Comments Encouraged wife to sit on pt's L side, encourage AROM of LUE and functional use, and joint protection for L shoulder.    Modified Fred (mRS)   Modified Fred Score 4   Functional Mobility   Sit to Stand Moderate Assist   Bed, Chair, Wheelchair Transfer Moderate Assist   Transfer Method Stand Pivot   Mobility w/B HHA; req facilitation of BLEs for txf.   ICU Target Mobility Level   ICU Mobility - Targeted Level Level 3B   Visual Perception   Visual Perception  X   Comments pt with R gaze, able to turn head, but unable to follow with eyes across midline. Req max v/cs to attend to LUE/LLE   Edema / Skin Assessment   Edema / Skin  Not Assessed   Activity Tolerance   Comments no c/o fatigue or SOB; left in chair     SLP: Cognition/swallow/speech  Patient seen this date for clinical swallow evaluation. Patient awake, alert, and pleasant during evaluation. Patient with clear vocal quality and no gross deficits noted during oral motor evaluation. Patient's dentition was intact. Patient complete informal tactile sensory testing to face and results were WNL. Patient consumed PO trials of single ice chips, purees, pudding, soft solids, mixed consistencies, crackers, and thin liquids via cup sip and straw. Patient consumed all PO trials with no overt s/sx of aspiration. No oral residue noted post-swallow and mastication appeared adequate. Patient did require some assistance w/ feeding  "d/t suspected depth perception difficulties, as he often poked his face with the straw before realizing exactly where it was in relation to him. Laryngeal elevation palpated as complete. Initiation of swallow trigger was timely.      Recommend patient start regular diet w/ thin liquids w/ assistance as needed. Straw sips ok. Ok for meds whole w/ thin liquid wash as tolerated. SLP to follow.       PMH:  Past Medical History:   Diagnosis Date   • Hx of right BKA (HCC)     since age of 10   • Stroke (HCC)     CVA in 2005, no remaining deficits       PSH:  No past surgical history on file.    FHX:  Diabetes mellitus type II in family   Older brother passed away from covid    Medications:  Current Facility-Administered Medications   Medication Dose   • artificial tears (EYE LUBRICANT) ophth ointment 1 Application  1 Application   • apixaban (ELIQUIS) tablet 5 mg  5 mg   • acetaminophen (Tylenol) tablet 650 mg  650 mg   • atorvastatin (LIPITOR) tablet 80 mg  80 mg   • ondansetron (ZOFRAN ODT) dispertab 4 mg  4 mg   • senna-docusate (PERICOLACE or SENOKOT S) 8.6-50 MG per tablet 2 Tablet  2 Tablet    And   • polyethylene glycol/lytes (MIRALAX) PACKET 1 Packet  1 Packet    And   • magnesium hydroxide (MILK OF MAGNESIA) suspension 30 mL  30 mL    And   • bisacodyl (DULCOLAX) suppository 10 mg  10 mg   • amLODIPine (NORVASC) tablet 5 mg  5 mg   • hydrALAZINE (APRESOLINE) injection 10-20 mg  10-20 mg   • labetalol (NORMODYNE/TRANDATE) injection 10 mg  10 mg   • ondansetron (ZOFRAN) syringe/vial injection 4 mg  4 mg       Allergies:  Allergies   Allergen Reactions   • Banana Hives and Itching   • Grape, Artificial Hives and Itching     ALLERGIC TO GRAPES.     Physical Exam:  Vitals: /69   Pulse 87   Temp 36.9 °C (98.4 °F) (Temporal)   Resp 17   Ht 1.676 m (5' 6\")   Wt 65.5 kg (144 lb 7.1 oz)   SpO2 93%     General: well-groomed sitting up in no acute distress, family members present  Eyes: no scleral icterus or " conjunctival injection  Ears, nose, mouth and throat: moist oral mucosa  Cardiovascular: regular rate, good peripheral perfusion  Respiratory: breathing room air comfortably without use of accessory muscles  Gastrointestinal: soft, nontender, nondistended  Genitourinary: no indwelling lance  Musculoskeletal: good symmetry in bilateral shoulders, no subluxation bilaterally  Skin: no wounds seen on exposed skin    Neurologic:  Mental status:awake, alert, answering questions appropriately, following directions  Speech: fluent, no aphasia or dysarthria, small voice  Motor:    Upper Extremity  Myotome R L   Shoulder flexion C5 5/5 4/5   Elbow flexion C5 5/5 4/5   Wrist extension C6 5/5 3/5   Elbow extension C7 5/5 5/5   Finger flexion C8 5/5 0/5   Finger abduction T1 5/5 0/5     Lower Extremity Myotome R L   Hip flexion L2 5/5 4/5   Knee extension L3 5/5 5/5   Ankle dorsiflexion L4 */5 5/5   Toe extension L5 */5 5/5   Ankle plantarflexion S1 */5 5/5     *prosthetic  No clonus at left ankle  Tone: no spasticity noted  Psychiatric: flat affect  Hematologic/lymphatic/immunologic: ++IV access, no bruises seen on exposed skin    Labs: Reviewed and significant for   Recent Labs     02/02/22  0430   RBC 4.56*   HEMOGLOBIN 15.0   HEMATOCRIT 43.0   PLATELETCT 284     Recent Labs     02/02/22  0430   SODIUM 136   POTASSIUM 3.9   CHLORIDE 103   CO2 23   GLUCOSE 94   BUN 13   CREATININE 0.82   CALCIUM 8.8     No results found for this or any previous visit (from the past 24 hour(s)).    ASSESSMENT:  IMPRESSION: The patient is a 66 y.o. 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 Genaro Sanchez MD, on 1/27/2022    [facility] Code: 0001.1 - Stroke: Left Body Involvement  (Right Brain)  -With acute secondary complications of: impaired ADLs and mobility, impaired cognition, left sided weakness, left sided neglect  -and:     Medical Complexity:  +COVID19 - now recovered  Diabetes mellitus type II, A1c 6.6% (new diagnosis)      Data points:  Reviewed results of radiology tests - noted above  Reviewed clinical lab tests - noted above  Reviewed old records - noted above    RECOMMENDATIONS:  ##MSK  #Impaired ADLs and mobility: Agree with continuing OT/PT/SLP while admitted here.    Will benefit from acute inpatient rehabilitation for OT, PT, and SLP (cognition, communication). Patient has good family support - likely adult grandson who will be primarily at home with him. Patient is motivated.    Will need to verify insurance and submit for prior authorization, if needed.    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 Genaro Sanchez MD, on 1/27/2022 and 1/29/2022  Neurology following  Has gained significant amount of strength in left arm - primarily weak in left finger flexion and abduction    ##SKIN  Recommend turning Q2hr and monitor for skin changes closely  PRAFO to be switched between left and right foot    ##GI  Last BM recorded 1/30/2022  Recommend goal of one continent BM daily  Agree with current bowel regimen    ##  #Urinary incontinence  Recommend timed voids Q4hr while awake and check postvoid residual with bladder scan. If PVR greater than 200mL, recommend intermittent catheterization.    Code: full resuscitation    DVT  chemoprophylaxis: eliquis 5mg BID for stroke    Thank you for allowing us to participate in the care of this patient. Physiatry will continue to follow and provide recommendations, as needed.    Patient was seen for 65 minutes on unit/floor of which > 50% of time was spent on counseling and coordination of care regarding the above, including prognosis, risk reduction, benefits of treatment, and options for next stage of care.    Jeancarlos Mcfarlane D.O.   Physical Medicine and Rehabilitation     I have performed a physical exam and reviewed and updated history and plan today (2/3/2022).

## 2022-02-03 NOTE — INFECTION CONTROL
2/3/2022 10:14 AM (Enrrique Mccormick):     This patient has been cleared. The PCR positive COVID test was an ancillary finding and not the reason for admission. The patient is currently not symptomatic and the CT value of his PCR COVID test from 1/27/2022 was 33.9.  It has been greater than five days from positive COVID test.

## 2022-02-03 NOTE — PROGRESS NOTES
Hospital Medicine Daily Progress Note    Date of Service  2/3/2022    Chief Complaint  Geoff Scott is a 66 y.o. male admitted 1/27/2022 with an acute CVA    Hospital Course  This is a unfortunate 66-year-old male with a history of right below-knee amputation, prior stroke, presented on 1/27, COVID-19 positive, acute onset of left-sided weakness and dysarthria, the patient initially received IV TPA and underwent endovascular clot retrieval, he was noted to have hemorrhagic transformation of the stroke, on 1/29 had acute neuro changes again with right gaze deviation and worsening dysarthria, the patient taken back to the IR suite for additional thrombectomy of the M2 and P1 segment, started on a heparin drip.  Neurology consulting, the patient be continued  Left-sided  weakness, left facial droop and dysarthria.    Interval Problem Update  Patient was seen and examined today.  No acute events overnight.  No active complaint a.m. rounds.  Patient is awake, pleasant and following commands, answering questions appropriately.  COVID recovered on 2/3   PT/OT   Rehab to re-eval the pt since the pt is Covid recovered.    I have personally seen and examined the patient at bedside. I discussed the plan of care with patient, bedside RN, charge RN,  and pharmacy.    Consultants/Specialty  neurology    Code Status  Full Code    Disposition  Patient is not medically cleared for discharge.   Anticipate discharge to to skilled nursing facility.  Versus acute rehab  I have placed the appropriate orders for post-discharge needs.    Review of Systems  Review of Systems   Constitutional: Positive for malaise/fatigue. Negative for chills and fever.   HENT: Negative.  Negative for congestion, ear discharge, ear pain, sinus pain and sore throat.    Eyes: Negative.  Negative for blurred vision and double vision.   Respiratory: Negative.  Negative for cough, sputum production, shortness of breath and wheezing.     Cardiovascular: Negative.  Negative for chest pain, palpitations and leg swelling.   Gastrointestinal: Negative.  Negative for abdominal pain, constipation, diarrhea, nausea and vomiting.   Genitourinary: Negative.  Negative for dysuria, frequency and urgency.   Musculoskeletal: Negative.  Negative for myalgias.   Skin: Negative.    Neurological: Positive for speech change, focal weakness and weakness. Negative for dizziness and headaches.   Endo/Heme/Allergies: Negative.    Psychiatric/Behavioral: The patient is not nervous/anxious.    All other systems reviewed and are negative.       Physical Exam  Temp:  [36.3 °C (97.4 °F)-37.1 °C (98.7 °F)] 36.9 °C (98.4 °F)  Pulse:  [77-95] 87  Resp:  [17-18] 17  BP: (102-118)/(69-79) 104/69  SpO2:  [93 %-95 %] 93 %    Physical Exam  Vitals and nursing note reviewed.   Constitutional:       Appearance: He is well-developed.   HENT:      Head: Normocephalic.      Comments: Left facial droop  Eyes:      Pupils: Pupils are equal, round, and reactive to light.   Cardiovascular:      Rate and Rhythm: Normal rate and regular rhythm.      Heart sounds: Normal heart sounds.   Pulmonary:      Effort: Pulmonary effort is normal.   Abdominal:      General: Bowel sounds are normal.      Palpations: Abdomen is soft.   Genitourinary:     Penis: Normal.       Rectum: Normal.   Musculoskeletal:         General: Normal range of motion.      Cervical back: Normal range of motion.      Comments: Right BKA   Skin:     General: Skin is warm.   Neurological:      Mental Status: He is alert and oriented to person, place, and time.      Motor: Weakness present.      Coordination: Coordination abnormal.         Fluids    Intake/Output Summary (Last 24 hours) at 2/3/2022 1237  Last data filed at 2/3/2022 0400  Gross per 24 hour   Intake 760 ml   Output 100 ml   Net 660 ml       Laboratory  Recent Labs     02/02/22  0430   WBC 11.0*   RBC 4.56*   HEMOGLOBIN 15.0   HEMATOCRIT 43.0   MCV 94.3   MCH 32.9    MCHC 34.9   RDW 45.0   PLATELETCT 284   MPV 9.0     Recent Labs     02/02/22  0430   SODIUM 136   POTASSIUM 3.9   CHLORIDE 103   CO2 23   GLUCOSE 94   BUN 13   CREATININE 0.82   CALCIUM 8.8                   Imaging  DX-CHEST-PORTABLE (1 VIEW)   Final Result         1.  Patchy pulmonary infiltrates, greater on the right. Similar compared to prior study.      CT-CTA HEAD WITH & W/O-POST PROCESS   Final Result         1.  Previously visualized partial occlusion of right M1 segment has resolved status post thrombectomy.   2.  Persistent occlusion of the right distal MCA branch vessel supplying area of infarct, similar to prior study.   3.  Evolving infarct in the right parietotemporal adnexal lobe region.   4.  Stable hyperdensities in the right sylvian fissure and sulci of the right parietotemporal lobe likely stable hemorrhages.      EC-ECHOCARDIOGRAM LTD W/ CONT   Final Result      IR-THROMBO MECHANICAL ARTERY,INIT   Final Result      1.  Filling defects at the inferior M2 segment and fetal right posterior cerebral artery.   2.  Spontaneous resolution of inferior M2 segment filling defect.   3.  Mechanical thrombectomy of fetal posterior cerebral artery using RED 62. Final angiogram demonstrates patent blood vessels indicating TICI 2b flow.   4.  Successful right internal jugular central venous catheter placement.      IR-APPLE,GROSHONG PLACEMENT >5   Final Result      1.  Filling defects at the inferior M2 segment and fetal right posterior cerebral artery.   2.  Spontaneous resolution of inferior M2 segment filling defect.   3.  Mechanical thrombectomy of fetal posterior cerebral artery using RED 62. Final angiogram demonstrates patent blood vessels indicating TICI 2b flow.   4.  Successful right internal jugular central venous catheter placement.      DX-CHEST-PORTABLE (1 VIEW)   Final Result      1.  Right central line projects over the SVC.   2.  No pneumothorax.   3.  Bilateral airspace opacities compatible  with multifocal Covid 19 pneumonia which are not significantly changed.      CT-CEREBRAL PERFUSION ANALYSIS   Final Result      1.  Cerebral blood flow less than 30% likely representing completed infarct = 62 mL.      2.  T Max more than 6 seconds likely representing combination of completed infarct and ischemia = 76 mL.      3.  Mismatched volume likely representing ischemic brain/penumbra = 14 mL      4.  Please note that the cerebral perfusion was performed on the limited brain tissue around the basal ganglia region. Infarct/ischemia outside the CT perfusion sections can be missed in this study.      CT-CTA HEAD WITH & W/O-POST PROCESS   Final Result      Subacute right parieto-occipital infarction without hemorrhagic transformation. The MCA branch vessel supplying this again show contrast cut off      Left thalamic vasogenic edema and subacute hemorrhage exerts mild mass effect, slightly worse than on comparison      New eccentric nonocclusive filling defects in the distal right internal carotid and M1 segments concerning for small thrombus favored over spasm      Similar distal right vertebral artery segmental narrowing may be from spasm or chronic. No focal occlusion and the left vertebral artery is dominant with normal vertebrobasilar confluence      MR-BRAIN-W/O   Final Result      1.  Multifocal areas of acute infarcts in the right frontal, temporal and parietal lobes. . The gradient echo images demonstrates linear area of magnetic susceptibility artifact at the insular cortex likely representing small area hemorrhagic    transformation or thrombus within the vessels.There are petechial microhemorrhages in the right medial posterior temporal lobe.   2.  There is an area of subacute hemorrhage with surrounding white matter edema in the left thalamus. This finding is noted on the previous CT scan dated 1/27/2022.   3.  Mild cerebral volume loss.   4.  Mild chronic microvascular ischemic disease.      CT-HEAD  W/O   Final Result      1. Developing hypodensity in the right temporal occipital region consistent with acute infarct.   2. Tiny foci of hemorrhage in the right insular ribbon and possible tiny foci of subarachnoid hemorrhage or contrast staining in the right parietal and temporo-occipital regions.      These findings were messaged via Voalte with CHARMAINE BENJI GONDA on 1/28/2022 11:58 AM.            US-EXTREMITY VENOUS LOWER BILAT   Final Result      DX-CHEST-PORTABLE (1 VIEW)   Final Result      Patchy airspace process consistent with edema or atelectasis      IR-THROMBO MECHANICAL ARTERY,INIT   Final Result      1.  Occluded inferior M2, superior M2 and some of the M3 branches.   2.  The fetal right PCA demonstrates thrombus at the middle of the procedure.   3.  Aspiration thrombectomy- inferior M2-RED 62-patent branches.   4.  Aspiration thrombectomy-superior M2-RED 62-patent branches.   5.  Aspiration thrombectomy-fetal right posterior cerebral artery-RED 62-patent branches.   6.  The final TICI score:2b      CT-CTA NECK WITH & W/O-POST PROCESSING   Final Result   Addendum 1 of 1   Addendum:   There is a small nonocclusive arterial thrombus within the right    innominate artery. This is seen on series 9 image 116.      These findings were discussed with REMY CARRERO by Dr. Rock on    1/27/2022.      Final      1.  No hemodynamically significant stenosis of the neck arteries.   2.  Bilateral ill-defined pulmonary opacities in keeping with Covid pneumonia.      CT-CTA HEAD WITH & W/O-POST PROCESS   Final Result      1.  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            Comment: Results discussed with Dr. Carrero at approximately 3:35 PM      CT-CEREBRAL PERFUSION ANALYSIS   Final Result      1.  Nondiagnostic scan secondary to patient motion.      CT-HEAD W/O   Final Result         NO ACUTE ABNORMALITIES ARE NOTED ON CT SCAN OF THE HEAD.                    Assessment/Plan  * Acute  ischemic stroke (HCC)- (present on admission)  Assessment & Plan  Atorvastatin 80mg q HS  Now on Eliquis  BP monitoring  Neurochecks closely while on heparin drip and MRI finding of hemorrhage.  Decreased to every 4 hours, transfer to neurology  Neurology consulting.  Aspiration precautions  SLP/PT/OT  COVID recovered on 2/3   Rehab to re-eval the pt since the pt is Covid recovered.    Type 2 diabetes mellitus with neurologic complication, without long-term current use of insulin (HCC)- (present on admission)  Assessment & Plan  1/29 A1c:6.6  Monitor accuchecks.  SSI if needed  Diabetic diet.    COVID-19  Assessment & Plan  Isolation precautions.  Supportive care  COVID recovered on 2/3   P     Plan  Transition to Eliquis  Continued efforts with PT OT speech therapy  GDMT, blood pressure control  Anticoagulation  Await rehab decisions  See orders  Medically complex high risk patient    VTE prophylaxis: therapeutic anticoagulation with Heparin    I have performed a physical exam and reviewed and updated ROS and Plan today (2/3/2022). In review of yesterday's note (2/2/2022), there are no changes except as documented above.      Please note that this dictation was created using voice recognition software. I have made every reasonable attempt to correct obvious errors, but I expect that there are errors of grammar and possibly context that I did not discover before finalizing the note.

## 2022-02-03 NOTE — THERAPY
Occupational Therapy  Daily Treatment     Patient Name: Geoff Scott  Age:  66 y.o., Sex:  male  Medical Record #: [id number]  Today's Date: 2/3/2022       Precautions: Fall Risk,Swallow Precautions ( See Comments)  Comments: hx of R BKA    Assessment    Pt seen for OT tx. Continues to be limited by decreased activity tolerance, balance deficits, inattention and LUE weakness impacting ability to complete ADLs and ADL transfers independently. Mod A to don prosthetic. Min A sit > stand, amb w/ FWW in room w/ min A. Encouraged pt to get to chair/BSC w/ nrsg as tolerated. Will continue to benefit from OT services while in house.     Plan    Continue current treatment plan.    DC Equipment Recommendations: Unable to determine at this time  Discharge Recommendations: Recommend post-acute placement for additional occupational therapy services prior to discharge home       02/03/22 1115   Cognition    Cognition / Consciousness X   Safety Awareness Impaired   Attention Impaired   Comments soft voice, difficult to understand. L inattention    Active ROM Upper Body   Active ROM Upper Body  X   Comments LUE limited ROM d/t weakness    Strength Upper Body   Upper Body Strength  X   Comments LUE weaker than RUE    Balance   Sitting Balance (Static) Fair -   Sitting Balance (Dynamic) Fair -   Standing Balance (Static) Fair -   Standing Balance (Dynamic) Poor +   Weight Shift Sitting Fair   Weight Shift Standing Fair   Bed Mobility    Supine to Sit Moderate Assist   Sit to Supine Moderate Assist   Activities of Daily Living   Grooming Minimal Assist;Seated   Upper Body Dressing Minimal Assist   Lower Body Dressing Moderate Assist  (prosthetic)   Toileting Maximal Assist   Functional Mobility   Sit to Stand Minimal Assist   Bed, Chair, Wheelchair Transfer Minimal Assist   Toilet Transfers Minimal Assist   Short Term Goals   Short Term Goal # 1 attend to L side w/min v/cs during ADL/txfs   Goal Outcome # 1 Progressing as expected    Short Term Goal # 2 LB dressing with min A   Goal Outcome # 2 Goal not met   Short Term Goal # 3 BSC txf with min A   Goal Outcome # 3 Goal not met   Short Term Goal # 4 use of LUE w/o v/cs during BUE seated g/h req min A   Goal Outcome # 4 Progressing as expected   Anticipated Discharge Equipment and Recommendations   DC Equipment Recommendations Unable to determine at this time   Discharge Recommendations Recommend post-acute placement for additional occupational therapy services prior to discharge home

## 2022-02-03 NOTE — DISCHARGE PLANNING
Care Transition Team Assessment      CM met with the patient, introduced self, and explained role.  Demographics and next of kin verified.  The patient has no insurance listed.  CM obtained a copy of the patients insurance card and provided it to patient financial services.  The patient is 66 and inquired of the spouse if the patient has Medicare.  She states no.  The patient resides with his spouse and was indep without the use of adaptive equipment prior to admission.  The spouse denies a hx of HHC/SNF/Rehab.  He obtains his rx from [facility].  CM will continue to follow for discharge needs, questions, and/or concerns.    Information Source  Orientation Level: Oriented X4  Information Given By: Patient,Significant Other  Informant's Name:  (Cookie Scott)  Who is responsible for making decisions for patient? : Spouse  Name(s) of Primary Decision Maker:  (Cookie Scott)    Readmission Evaluation  Is this a readmission?: No    Elopement Risk  Legal Hold: No  Ambulatory or Self Mobile in Wheelchair: No-Not an Elopement Risk  Elopement Risk: Not at Risk for Elopement    Interdisciplinary Discharge Planning  Lives with - Patient's Self Care Capacity: Spouse  Housing / Facility: [address] [facility]  Prior Services: Home-Independent    Discharge Preparedness  What is your plan after discharge?: Uncertain - pending medical team collaboration  What are your discharge supports?: Spouse  Prior Functional Level: Ambulatory,Independent with Activities of Daily Living    Functional Assesment  Prior Functional Level: Ambulatory,Independent with Activities of Daily Living    Finances  Financial Barriers to Discharge: Yes  Average Monthly Income:  (zero dollars according to the spouse)  Source of Income: None  Prescription Coverage: Yes                                  Anticipated Discharge Information  Discharge Disposition: D/T to IP rehab facility w/planned hosp IP readmit (90)

## 2022-02-04 PROCEDURE — 770001 HCHG ROOM/CARE - MED/SURG/GYN PRIV*

## 2022-02-04 PROCEDURE — 700102 HCHG RX REV CODE 250 W/ 637 OVERRIDE(OP): Performed by: STUDENT IN AN ORGANIZED HEALTH CARE EDUCATION/TRAINING PROGRAM

## 2022-02-04 PROCEDURE — 99232 SBSQ HOSP IP/OBS MODERATE 35: CPT | Performed by: STUDENT IN AN ORGANIZED HEALTH CARE EDUCATION/TRAINING PROGRAM

## 2022-02-04 PROCEDURE — 92523 SPEECH SOUND LANG COMPREHEN: CPT

## 2022-02-04 PROCEDURE — A9270 NON-COVERED ITEM OR SERVICE: HCPCS | Performed by: HOSPITALIST

## 2022-02-04 PROCEDURE — 700102 HCHG RX REV CODE 250 W/ 637 OVERRIDE(OP): Performed by: NURSE PRACTITIONER

## 2022-02-04 PROCEDURE — A9270 NON-COVERED ITEM OR SERVICE: HCPCS | Performed by: STUDENT IN AN ORGANIZED HEALTH CARE EDUCATION/TRAINING PROGRAM

## 2022-02-04 PROCEDURE — 700102 HCHG RX REV CODE 250 W/ 637 OVERRIDE(OP): Performed by: HOSPITALIST

## 2022-02-04 PROCEDURE — A9270 NON-COVERED ITEM OR SERVICE: HCPCS | Performed by: NURSE PRACTITIONER

## 2022-02-04 RX ORDER — CALCIUM CARBONATE 500 MG/1
500 TABLET, CHEWABLE ORAL
Status: DISCONTINUED | OUTPATIENT
Start: 2022-02-04 | End: 2022-02-08

## 2022-02-04 RX ADMIN — ATORVASTATIN CALCIUM 80 MG: 40 TABLET, FILM COATED ORAL at 17:55

## 2022-02-04 RX ADMIN — AMLODIPINE BESYLATE 5 MG: 5 TABLET ORAL at 05:51

## 2022-02-04 RX ADMIN — CALCIUM CARBONATE 500 MG: 500 TABLET, CHEWABLE ORAL at 17:55

## 2022-02-04 RX ADMIN — DOCUSATE SODIUM 50 MG AND SENNOSIDES 8.6 MG 2 TABLET: 8.6; 5 TABLET, FILM COATED ORAL at 17:55

## 2022-02-04 RX ADMIN — APIXABAN 5 MG: 5 TABLET, FILM COATED ORAL at 17:55

## 2022-02-04 RX ADMIN — APIXABAN 5 MG: 5 TABLET, FILM COATED ORAL at 05:51

## 2022-02-04 ASSESSMENT — ENCOUNTER SYMPTOMS
SHORTNESS OF BREATH: 0
WEAKNESS: 1
MYALGIAS: 0
DIZZINESS: 0
EYES NEGATIVE: 1
SPEECH CHANGE: 1
SPUTUM PRODUCTION: 0
SINUS PAIN: 0
DOUBLE VISION: 0
COUGH: 0
NAUSEA: 0
GASTROINTESTINAL NEGATIVE: 1
RESPIRATORY NEGATIVE: 1
HEADACHES: 0
BLURRED VISION: 0
PALPITATIONS: 0
ABDOMINAL PAIN: 0
WHEEZING: 0
NERVOUS/ANXIOUS: 0
FOCAL WEAKNESS: 1
FEVER: 0
DIARRHEA: 0
CHILLS: 0
SORE THROAT: 0
VOMITING: 0
CONSTIPATION: 0
CARDIOVASCULAR NEGATIVE: 1
MUSCULOSKELETAL NEGATIVE: 1

## 2022-02-04 ASSESSMENT — PAIN DESCRIPTION - PAIN TYPE
TYPE: ACUTE PAIN
TYPE: ACUTE PAIN;CHRONIC PAIN

## 2022-02-04 NOTE — THERAPY
Physical Therapy   Daily Treatment     Patient Name: Geoff Scott  Age:  66 y.o., Sex:  male  Medical Record #: [id number]  Today's Date: 2/3/2022     Precautions  Precautions: Fall Risk;Swallow Precautions ( See Comments)  Comments: hx of R THIENA    Assessment    Pt was pleasant and agreeable to therapy session. He was very soft spoken and difficult to understand at times. He required extra time at EOB donning prosthetic at EOB with occasional posterior lob but able to self correct. He performed STS transfers with Tati for lift off and balance as well step by step sequencing for turning to transfer into chair. He was able to ambulate 20ft with fww with Tati, guidance of fww and balance. He presents with increased mary jane and short shuffling steps with prosthetic. Will continue to follow while in house and educated on increasing mobility with nursing staff.     Plan    Continue current treatment plan.    DC Equipment Recommendations: Unable to determine at this time  Discharge Recommendations: Recommend post-acute placement for additional physical therapy services prior to discharge home         02/03/22 1001   Other Treatments   Other Treatments Provided extra time at EOB donning prosthetic, Occassional posterior lean    Balance   Sitting Balance (Static) Fair -   Sitting Balance (Dynamic) Fair -   Standing Balance (Static) Fair -   Standing Balance (Dynamic) Poor +   Weight Shift Sitting Fair   Weight Shift Standing Fair   Gait Analysis   Gait Level Of Assist Minimal Assist   Assistive Device Front Wheel Walker   Distance (Feet) 20   # of Times Distance was Traveled 1   Deviation Increased Base Of Support   Skilled Intervention Verbal Cuing;Sequencing   Comments with prosthetic Tati for balance and fww management. Increased mary jane and short shuffling steps due to prosthetic    Bed Mobility    Supine to Sit Moderate Assist   Sit to Supine   (left up in chair )   Scooting Minimal Assist   Skilled Intervention Verbal  Cuing;Sequencing   Comments modA for trunk to reach EOB    Functional Mobility   Sit to Stand Minimal Assist   Bed, Chair, Wheelchair Transfer Minimal Assist   Skilled Intervention Verbal Cuing;Sequencing   Comments Tati for STS with vc for LUE  on fww. And vc for step by step sequencing    Short Term Goals    Short Term Goal # 1 Patient will move supine<>sitting EOB without bed features with supervision within 6tx in order to get in/out of bed   Goal Outcome # 1 goal not met   Short Term Goal # 2 Patient will move sitting<>standing with supervision within 6tx in order to initiate transfers and gait   Goal Outcome # 2 Goal not met   Short Term Goal # 3 Patient will ambulate 15ft with mod A within 6tx in order to access environment   Goal Outcome # 3 Goal not met

## 2022-02-04 NOTE — PROGRESS NOTES
Assumed care of patient at 1900. Patient is A&Ox4 and Q4hr neuro checks are in place. Q2hr turns are encouraged. Patient is on a SHEY mattress to maintain skin integrity. Bed is low and locked, bed alarm is on, call light is within reach, hourly rounding continues.

## 2022-02-04 NOTE — CARE PLAN
The patient is Stable - Low risk of patient condition declining or worsening    Shift Goals  Clinical Goals: Monitor neuro status  Patient Goals: Sleep  Family Goals: Improvements in defecits    Progress made toward(s) clinical / shift goals: Q4hr neuro checks are in place, patient is A&Ox4. Fall precautions are in place.     Problem: Fall Risk  Goal: Patient will remain free from falls  Outcome: Progressing  Note: Treaded slippers are on, bed alarm is on, yellow fall risk bracelet is on, and fall risk sign is outside of the door.     Patient is not progressing towards the following goals:    Problem: Skin Integrity  Goal: Skin integrity is maintained or improved  Outcome: Not Progressing  Note: Encouraging Q2hr turns, patient turns well with R BKA, but encouragement needed. SHEY mattress is in place.

## 2022-02-04 NOTE — PROGRESS NOTES
Hospital Medicine Daily Progress Note    Date of Service  2/4/2022    Chief Complaint  Geoff Scott is a 66 y.o. male admitted 1/27/2022 with an acute CVA    Hospital Course  This is a unfortunate 66-year-old male with a history of right below-knee amputation, prior stroke, presented on 1/27, COVID-19 positive, acute onset of left-sided weakness and dysarthria, the patient initially received IV TPA and underwent endovascular clot retrieval, he was noted to have hemorrhagic transformation of the stroke, on 1/29 had acute neuro changes again with right gaze deviation and worsening dysarthria, the patient taken back to the IR suite for additional thrombectomy of the M2 and P1 segment, started on a heparin drip.  Neurology consulting, the patient be continued  Left-sided  weakness, left facial droop and dysarthria.    Interval Problem Update  Patient was seen and examined today.  No acute events overnight.  No active complaint a.m. rounds.  Patient is awake, pleasant and following commands, answering questions appropriately. He showed me his family photos.   COVID recovered on 2/3   PT/OT   Rehab following.     I have personally seen and examined the patient at bedside. I discussed the plan of care with patient, bedside RN, charge RN,  and pharmacy.    Consultants/Specialty  neurology    Code Status  Full Code    Disposition  Patient is not medically cleared for discharge.   Anticipate discharge to to skilled nursing facility.  Versus acute rehab  I have placed the appropriate orders for post-discharge needs.    Review of Systems  Review of Systems   Constitutional: Positive for malaise/fatigue. Negative for chills and fever.   HENT: Negative.  Negative for congestion, ear discharge, ear pain, sinus pain and sore throat.    Eyes: Negative.  Negative for blurred vision and double vision.   Respiratory: Negative.  Negative for cough, sputum production, shortness of breath and wheezing.    Cardiovascular:  Negative.  Negative for chest pain, palpitations and leg swelling.   Gastrointestinal: Negative.  Negative for abdominal pain, constipation, diarrhea, nausea and vomiting.   Genitourinary: Negative.  Negative for dysuria, frequency and urgency.   Musculoskeletal: Negative.  Negative for myalgias.   Skin: Negative.    Neurological: Positive for speech change, focal weakness and weakness. Negative for dizziness and headaches.   Endo/Heme/Allergies: Negative.    Psychiatric/Behavioral: The patient is not nervous/anxious.    All other systems reviewed and are negative.       Physical Exam  Temp:  [36.3 °C (97.3 °F)-36.9 °C (98.4 °F)] 36.8 °C (98.2 °F)  Pulse:  [78-99] 83  Resp:  [17] 17  BP: (101-140)/(65-89) 120/89  SpO2:  [91 %-93 %] 91 %    Physical Exam  Vitals and nursing note reviewed.   Constitutional:       Appearance: He is well-developed.   HENT:      Head: Normocephalic.      Comments: Left facial droop  Eyes:      Pupils: Pupils are equal, round, and reactive to light.   Cardiovascular:      Rate and Rhythm: Normal rate and regular rhythm.      Heart sounds: Normal heart sounds.   Pulmonary:      Effort: Pulmonary effort is normal.   Abdominal:      General: Bowel sounds are normal.      Palpations: Abdomen is soft.   Genitourinary:     Penis: Normal.       Rectum: Normal.   Musculoskeletal:         General: Normal range of motion.      Cervical back: Normal range of motion.      Comments: Right BKA   Skin:     General: Skin is warm.   Neurological:      Mental Status: He is alert and oriented to person, place, and time.      Motor: Weakness present.      Coordination: Coordination abnormal.         Fluids  No intake or output data in the 24 hours ending 02/04/22 1213    Laboratory  Recent Labs     02/02/22  0430   WBC 11.0*   RBC 4.56*   HEMOGLOBIN 15.0   HEMATOCRIT 43.0   MCV 94.3   MCH 32.9   MCHC 34.9   RDW 45.0   PLATELETCT 284   MPV 9.0     Recent Labs     02/02/22  0430   SODIUM 136   POTASSIUM 3.9    CHLORIDE 103   CO2 23   GLUCOSE 94   BUN 13   CREATININE 0.82   CALCIUM 8.8                   Imaging  DX-CHEST-PORTABLE (1 VIEW)   Final Result         1.  Patchy pulmonary infiltrates, greater on the right. Similar compared to prior study.      CT-CTA HEAD WITH & W/O-POST PROCESS   Final Result         1.  Previously visualized partial occlusion of right M1 segment has resolved status post thrombectomy.   2.  Persistent occlusion of the right distal MCA branch vessel supplying area of infarct, similar to prior study.   3.  Evolving infarct in the right parietotemporal adnexal lobe region.   4.  Stable hyperdensities in the right sylvian fissure and sulci of the right parietotemporal lobe likely stable hemorrhages.      EC-ECHOCARDIOGRAM LTD W/ CONT   Final Result      IR-THROMBO MECHANICAL ARTERY,INIT   Final Result      1.  Filling defects at the inferior M2 segment and fetal right posterior cerebral artery.   2.  Spontaneous resolution of inferior M2 segment filling defect.   3.  Mechanical thrombectomy of fetal posterior cerebral artery using RED 62. Final angiogram demonstrates patent blood vessels indicating TICI 2b flow.   4.  Successful right internal jugular central venous catheter placement.      IR-APPLE,GROSHCLAUDIA PLACEMENT >5   Final Result      1.  Filling defects at the inferior M2 segment and fetal right posterior cerebral artery.   2.  Spontaneous resolution of inferior M2 segment filling defect.   3.  Mechanical thrombectomy of fetal posterior cerebral artery using RED 62. Final angiogram demonstrates patent blood vessels indicating TICI 2b flow.   4.  Successful right internal jugular central venous catheter placement.      DX-CHEST-PORTABLE (1 VIEW)   Final Result      1.  Right central line projects over the SVC.   2.  No pneumothorax.   3.  Bilateral airspace opacities compatible with multifocal Covid 19 pneumonia which are not significantly changed.      CT-CEREBRAL PERFUSION ANALYSIS    Final Result      1.  Cerebral blood flow less than 30% likely representing completed infarct = 62 mL.      2.  T Max more than 6 seconds likely representing combination of completed infarct and ischemia = 76 mL.      3.  Mismatched volume likely representing ischemic brain/penumbra = 14 mL      4.  Please note that the cerebral perfusion was performed on the limited brain tissue around the basal ganglia region. Infarct/ischemia outside the CT perfusion sections can be missed in this study.      CT-CTA HEAD WITH & W/O-POST PROCESS   Final Result      Subacute right parieto-occipital infarction without hemorrhagic transformation. The MCA branch vessel supplying this again show contrast cut off      Left thalamic vasogenic edema and subacute hemorrhage exerts mild mass effect, slightly worse than on comparison      New eccentric nonocclusive filling defects in the distal right internal carotid and M1 segments concerning for small thrombus favored over spasm      Similar distal right vertebral artery segmental narrowing may be from spasm or chronic. No focal occlusion and the left vertebral artery is dominant with normal vertebrobasilar confluence      MR-BRAIN-W/O   Final Result      1.  Multifocal areas of acute infarcts in the right frontal, temporal and parietal lobes. . The gradient echo images demonstrates linear area of magnetic susceptibility artifact at the insular cortex likely representing small area hemorrhagic    transformation or thrombus within the vessels.There are petechial microhemorrhages in the right medial posterior temporal lobe.   2.  There is an area of subacute hemorrhage with surrounding white matter edema in the left thalamus. This finding is noted on the previous CT scan dated 1/27/2022.   3.  Mild cerebral volume loss.   4.  Mild chronic microvascular ischemic disease.      CT-HEAD W/O   Final Result      1. Developing hypodensity in the right temporal occipital region consistent with  acute infarct.   2. Tiny foci of hemorrhage in the right insular ribbon and possible tiny foci of subarachnoid hemorrhage or contrast staining in the right parietal and temporo-occipital regions.      These findings were messaged via Voal[employer] with JEREMY M GONDA on 1/28/2022 11:58 AM.            US-EXTREMITY VENOUS LOWER BILAT   Final Result      DX-CHEST-PORTABLE (1 VIEW)   Final Result      Patchy airspace process consistent with edema or atelectasis      IR-THROMBO MECHANICAL ARTERY,INIT   Final Result      1.  Occluded inferior M2, superior M2 and some of the M3 branches.   2.  The fetal right PCA demonstrates thrombus at the middle of the procedure.   3.  Aspiration thrombectomy- inferior M2-RED 62-patent branches.   4.  Aspiration thrombectomy-superior M2-RED 62-patent branches.   5.  Aspiration thrombectomy-fetal right posterior cerebral artery-RED 62-patent branches.   6.  The final TICI score:2b      CT-CTA NECK WITH & W/O-POST PROCESSING   Final Result   Addendum 1 of 1   Addendum:   There is a small nonocclusive arterial thrombus within the right    innominate artery. This is seen on series 9 image 116.      These findings were discussed with REMY CARRERO by Dr. Rock on    1/27/2022.      Final      1.  No hemodynamically significant stenosis of the neck arteries.   2.  Bilateral ill-defined pulmonary opacities in keeping with Covid pneumonia.      CT-CTA HEAD WITH & W/O-POST PROCESS   Final Result      1.  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            Comment: Results discussed with Dr. Carrero at approximately 3:35 PM      CT-CEREBRAL PERFUSION ANALYSIS   Final Result      1.  Nondiagnostic scan secondary to patient motion.      CT-HEAD W/O   Final Result         NO ACUTE ABNORMALITIES ARE NOTED ON CT SCAN OF THE HEAD.                    Assessment/Plan  * Acute ischemic stroke (HCC)- (present on admission)  Assessment & Plan  Atorvastatin 80mg q HS  Now on  Eliquis  BP monitoring  Neurochecks closely while on heparin drip and MRI finding of hemorrhage.  Decreased to every 4 hours, transfer to neurology  Neurology consulting.  Aspiration precautions  SLP/PT/OT  COVID recovered on 2/3   Rehab to re-eval the pt since the pt is Covid recovered.    Type 2 diabetes mellitus with neurologic complication, without long-term current use of insulin (HCC)- (present on admission)  Assessment & Plan  1/29 A1c:6.6  Monitor accuchecks.  SSI if needed  Diabetic diet.    COVID-19  Assessment & Plan  Isolation precautions.  Supportive care  COVID recovered on 2/3   P     Plan  Transition to Eliquis  Continued efforts with PT OT speech therapy  GDMT, blood pressure control  Anticoagulation  Await rehab decisions  See orders  Medically complex high risk patient    VTE prophylaxis: therapeutic anticoagulation with Heparin    I have performed a physical exam and reviewed and updated ROS and Plan today (2/4/2022). In review of yesterday's note (2/3/2022), there are no changes except as documented above.      Please note that this dictation was created using voice recognition software. I have made every reasonable attempt to correct obvious errors, but I expect that there are errors of grammar and possibly context that I did not discover before finalizing the note.

## 2022-02-04 NOTE — THERAPY
Missed Therapy     Patient Name: Geoff Scott  Age:  66 y.o., Sex:  male  Medical Record #: [id number]  Today's Date: 2/4/2022 02/04/22 1331   Interdisciplinary Plan of Care Collaboration   Collaboration Comments Attempted therpay follow up session. Pt soundly sleeping , wishing to rest at this time. Per RN has been up to chair an drestroom today, will continue to follow up as able.

## 2022-02-05 PROCEDURE — A9270 NON-COVERED ITEM OR SERVICE: HCPCS | Performed by: HOSPITALIST

## 2022-02-05 PROCEDURE — A9270 NON-COVERED ITEM OR SERVICE: HCPCS | Performed by: NURSE PRACTITIONER

## 2022-02-05 PROCEDURE — 700102 HCHG RX REV CODE 250 W/ 637 OVERRIDE(OP): Performed by: HOSPITALIST

## 2022-02-05 PROCEDURE — 700102 HCHG RX REV CODE 250 W/ 637 OVERRIDE(OP): Performed by: STUDENT IN AN ORGANIZED HEALTH CARE EDUCATION/TRAINING PROGRAM

## 2022-02-05 PROCEDURE — 99232 SBSQ HOSP IP/OBS MODERATE 35: CPT | Performed by: STUDENT IN AN ORGANIZED HEALTH CARE EDUCATION/TRAINING PROGRAM

## 2022-02-05 PROCEDURE — A9270 NON-COVERED ITEM OR SERVICE: HCPCS | Performed by: STUDENT IN AN ORGANIZED HEALTH CARE EDUCATION/TRAINING PROGRAM

## 2022-02-05 PROCEDURE — 700102 HCHG RX REV CODE 250 W/ 637 OVERRIDE(OP): Performed by: NURSE PRACTITIONER

## 2022-02-05 PROCEDURE — 770001 HCHG ROOM/CARE - MED/SURG/GYN PRIV*

## 2022-02-05 RX ADMIN — APIXABAN 5 MG: 5 TABLET, FILM COATED ORAL at 04:42

## 2022-02-05 RX ADMIN — ATORVASTATIN CALCIUM 80 MG: 40 TABLET, FILM COATED ORAL at 17:26

## 2022-02-05 RX ADMIN — CALCIUM CARBONATE 500 MG: 500 TABLET, CHEWABLE ORAL at 08:27

## 2022-02-05 RX ADMIN — MINERAL OIL, PETROLATUM 1 APPLICATION: 425; 573 OINTMENT OPHTHALMIC at 17:28

## 2022-02-05 RX ADMIN — APIXABAN 5 MG: 5 TABLET, FILM COATED ORAL at 17:26

## 2022-02-05 RX ADMIN — CALCIUM CARBONATE 500 MG: 500 TABLET, CHEWABLE ORAL at 17:27

## 2022-02-05 RX ADMIN — CALCIUM CARBONATE 500 MG: 500 TABLET, CHEWABLE ORAL at 12:21

## 2022-02-05 ASSESSMENT — ENCOUNTER SYMPTOMS
DOUBLE VISION: 0
SINUS PAIN: 0
EYES NEGATIVE: 1
FOCAL WEAKNESS: 1
HEADACHES: 0
DIZZINESS: 0
ABDOMINAL PAIN: 0
PALPITATIONS: 0
CONSTIPATION: 0
COUGH: 0
NAUSEA: 0
DIARRHEA: 0
CHILLS: 0
WEAKNESS: 1
SORE THROAT: 0
BLURRED VISION: 0
SPUTUM PRODUCTION: 0
SHORTNESS OF BREATH: 0
NERVOUS/ANXIOUS: 0
CARDIOVASCULAR NEGATIVE: 1
FEVER: 0
MYALGIAS: 0
WHEEZING: 0
GASTROINTESTINAL NEGATIVE: 1
RESPIRATORY NEGATIVE: 1
VOMITING: 0
MUSCULOSKELETAL NEGATIVE: 1
SPEECH CHANGE: 1

## 2022-02-05 ASSESSMENT — PAIN DESCRIPTION - PAIN TYPE
TYPE: ACUTE PAIN

## 2022-02-05 NOTE — THERAPY
Speech Language Pathology   Initial Assessment     Patient Name: Geoff Scott  AGE:  66 y.o., SEX:  male  Medical Record #: [id number]  Today's Date: 2/4/2022     Precautions  Precautions: (P) Fall Risk,Swallow Precautions ( See Comments)  Comments: (P) hx R BKA    Assessment    Patient is 66 y.o. male with a diagnosis of CVA. Per MD notes in EMR-This is a unfortunate 66-year-old male with a history of right below-knee amputation, prior stroke, presented on 1/27, COVID-19 positive, acute onset of left-sided weakness and dysarthria, the patient initially received IV TPA and underwent endovascular clot retrieval, he was noted to have hemorrhagic transformation of the stroke, on 1/29 had acute neuro changes again with right gaze deviation and worsening dysarthria, the patient taken back to the IR suite for additional thrombectomy of the M2 and P1 segment, started on a heparin drip.  Neurology consulting, the patient be continued  Left-sided  weakness, left facial droop and dysarthria.    Cognitive eval-  Parts of NCSE and nonstandard cognitive linguistic eval completed with pt demonstrating current moderate to severe cognitive linguistic deficits. Per NCSE xyxifnj-czhyydftgom-2/12= average; attention-7/8=average; memory-4/12=severe; calculations-2/4=mild; similarities-2/8=severe; and judgement-2/6=moderate. Pt with left neglect and probable field cut with decreased oral reading of words past mild line and when placed at the right visual field. Pt with severe disorganized clock drawing with left neglect and incorrect number placement. Pt with current decreased legibility with cursive writing at the word level. Pt demonstrating reduced insight when verbalizing he was upset that he would need to stay one more day in the hospital when SLP provided educ regarding continued therapy recommended.      Additional factors influencing patient status/progress: significant visual perceptual deficit r/t left neglect/inattention  and possible field cut on left.     Plan  Simple visual perceptual tasks targeting attention to midline with larger bold printed material and or objects. Writing word level. Orientation using larger printed material   Recommend Speech Therapy 4 times per week until therapy goals are met for the following treatments:  Cognitive-Linguistic Training/reading word to phrase level, writing word level.     Discharge Recommendations: (P) Recommend post-acute placement for additional speech therapy services prior to discharge home       02/04/22 1155   Verbal Expression   Verbal Expression / Aphasia Eval (WDL) WDL   Auditory Comprehension   Auditory Comprehension (WDL) X   Understands Simple, Structured Conversation  Supervision (5)   Reading Comprehension   Reading Comprehension (WDL) X   Reading Phrases Severe (2)   Comments significant left neglect and probable field cut   Written Expression   Written Expression (WDL) X   Functional Writing: Name Severe (2)   Functional Writing Dictation: Word Severe (2)   Dominant Hand Right   Skilled Intervention Verbal Cueing   Cognitive-Linguistic   Cognitive-Linguistic (WDL) X   Level of Consciousness Alert   Orientation Level Not Oriented to Day;Not Oriented to Time   Short Term Memory Severe (2)   Simple Reasoning / Problem Solving Severe (2)   Insight into Deficits Severe (2)   Auditory Math Minimal (4)   Clock Drawing Disorganization;Poor Planning;Left Neglect   Outcome Measures   Outcome Measures Utilized   (parts of NCSE and nonstandard cognitive  linguistic)   Short Term Goals   Short Term Goal # 3 Pt will attend to  midline during simple visual tasks with moderate visual and verbal cues and 75% accuracy.    Goal Outcome  # 3 Goal not met   Short Term Goal # 4 Pt will state simple temporal and spatial orientation reading large printed material placed towards midline and right visual field and with mod to max cues and 75% accuracy.    Goal Outcome  # 4 Goal not met   Session  Information   Priority 3  (4x.RMCA, CVA x3, [id number],COVID,SB6/TNO,cogdone)

## 2022-02-05 NOTE — PROGRESS NOTES
Hospital Medicine Daily Progress Note    Date of Service  2/5/2022    Chief Complaint  Geoff Scott is a 66 y.o. male admitted 1/27/2022 with an acute CVA    Hospital Course  This is a unfortunate 66-year-old male with a history of right below-knee amputation, prior stroke, presented on 1/27, COVID-19 positive, acute onset of left-sided weakness and dysarthria, the patient initially received IV TPA and underwent endovascular clot retrieval, he was noted to have hemorrhagic transformation of the stroke, on 1/29 had acute neuro changes again with right gaze deviation and worsening dysarthria, the patient taken back to the IR suite for additional thrombectomy of the M2 and P1 segment, started on a heparin drip.  Neurology consulting, the patient be continued  Left-sided  weakness, left facial droop and dysarthria.    Interval Problem Update  Patient was seen and examined today.  No acute events overnight.  No active complaint a.m. rounds.  Patient is awake, pleasant and following commands, answering questions appropriately.   COVID recovered on 2/3   PT/OT   Rehab following   SNF pending - medically cleared     I have personally seen and examined the patient at bedside. I discussed the plan of care with patient, bedside RN and charge RN.    Consultants/Specialty  neurology    Code Status  Full Code    Disposition  Patient is medically cleared for discharge.   Anticipate discharge to to skilled nursing facility.  Versus acute rehab  I have placed the appropriate orders for post-discharge needs.    Review of Systems  Review of Systems   Constitutional: Positive for malaise/fatigue. Negative for chills and fever.   HENT: Negative.  Negative for congestion, ear discharge, ear pain, sinus pain and sore throat.    Eyes: Negative.  Negative for blurred vision and double vision.   Respiratory: Negative.  Negative for cough, sputum production, shortness of breath and wheezing.    Cardiovascular: Negative.  Negative for chest  pain, palpitations and leg swelling.   Gastrointestinal: Negative.  Negative for abdominal pain, constipation, diarrhea, nausea and vomiting.   Genitourinary: Negative.  Negative for dysuria, frequency and urgency.   Musculoskeletal: Negative.  Negative for myalgias.   Skin: Negative.    Neurological: Positive for speech change, focal weakness and weakness. Negative for dizziness and headaches.   Endo/Heme/Allergies: Negative.    Psychiatric/Behavioral: The patient is not nervous/anxious.    All other systems reviewed and are negative.       Physical Exam  Temp:  [36.5 °C (97.7 °F)-37.1 °C (98.7 °F)] 36.7 °C (98 °F)  Pulse:  [75-91] 75  Resp:  [16-18] 18  BP: (103-120)/(76-79) 113/76  SpO2:  [92 %-96 %] 92 %    Physical Exam  Vitals and nursing note reviewed.   Constitutional:       Appearance: He is well-developed.   HENT:      Head: Normocephalic.      Comments: Left facial droop  Eyes:      Pupils: Pupils are equal, round, and reactive to light.   Cardiovascular:      Rate and Rhythm: Normal rate and regular rhythm.      Heart sounds: Normal heart sounds.   Pulmonary:      Effort: Pulmonary effort is normal.   Abdominal:      General: Bowel sounds are normal.      Palpations: Abdomen is soft.   Genitourinary:     Penis: Normal.       Rectum: Normal.   Musculoskeletal:         General: Normal range of motion.      Cervical back: Normal range of motion.      Comments: Right BKA   Skin:     General: Skin is warm.   Neurological:      Mental Status: He is alert and oriented to person, place, and time.      Motor: Weakness present.      Coordination: Coordination abnormal.         Fluids    Intake/Output Summary (Last 24 hours) at 2/5/2022 1041  Last data filed at 2/5/2022 0748  Gross per 24 hour   Intake --   Output 500 ml   Net -500 ml       Laboratory                        Imaging  DX-CHEST-PORTABLE (1 VIEW)   Final Result         1.  Patchy pulmonary infiltrates, greater on the right. Similar compared to prior  study.      CT-CTA HEAD WITH & W/O-POST PROCESS   Final Result         1.  Previously visualized partial occlusion of right M1 segment has resolved status post thrombectomy.   2.  Persistent occlusion of the right distal MCA branch vessel supplying area of infarct, similar to prior study.   3.  Evolving infarct in the right parietotemporal adnexal lobe region.   4.  Stable hyperdensities in the right sylvian fissure and sulci of the right parietotemporal lobe likely stable hemorrhages.      EC-ECHOCARDIOGRAM LTD W/ CONT   Final Result      IR-THROMBO MECHANICAL ARTERY,INIT   Final Result      1.  Filling defects at the inferior M2 segment and fetal right posterior cerebral artery.   2.  Spontaneous resolution of inferior M2 segment filling defect.   3.  Mechanical thrombectomy of fetal posterior cerebral artery using RED 62. Final angiogram demonstrates patent blood vessels indicating TICI 2b flow.   4.  Successful right internal jugular central venous catheter placement.      IR-APPLE,GROSHONG PLACEMENT >5   Final Result      1.  Filling defects at the inferior M2 segment and fetal right posterior cerebral artery.   2.  Spontaneous resolution of inferior M2 segment filling defect.   3.  Mechanical thrombectomy of fetal posterior cerebral artery using RED 62. Final angiogram demonstrates patent blood vessels indicating TICI 2b flow.   4.  Successful right internal jugular central venous catheter placement.      DX-CHEST-PORTABLE (1 VIEW)   Final Result      1.  Right central line projects over the SVC.   2.  No pneumothorax.   3.  Bilateral airspace opacities compatible with multifocal Covid 19 pneumonia which are not significantly changed.      CT-CEREBRAL PERFUSION ANALYSIS   Final Result      1.  Cerebral blood flow less than 30% likely representing completed infarct = 62 mL.      2.  T Max more than 6 seconds likely representing combination of completed infarct and ischemia = 76 mL.      3.  Mismatched volume  likely representing ischemic brain/penumbra = 14 mL      4.  Please note that the cerebral perfusion was performed on the limited brain tissue around the basal ganglia region. Infarct/ischemia outside the CT perfusion sections can be missed in this study.      CT-CTA HEAD WITH & W/O-POST PROCESS   Final Result      Subacute right parieto-occipital infarction without hemorrhagic transformation. The MCA branch vessel supplying this again show contrast cut off      Left thalamic vasogenic edema and subacute hemorrhage exerts mild mass effect, slightly worse than on comparison      New eccentric nonocclusive filling defects in the distal right internal carotid and M1 segments concerning for small thrombus favored over spasm      Similar distal right vertebral artery segmental narrowing may be from spasm or chronic. No focal occlusion and the left vertebral artery is dominant with normal vertebrobasilar confluence      MR-BRAIN-W/O   Final Result      1.  Multifocal areas of acute infarcts in the right frontal, temporal and parietal lobes. . The gradient echo images demonstrates linear area of magnetic susceptibility artifact at the insular cortex likely representing small area hemorrhagic    transformation or thrombus within the vessels.There are petechial microhemorrhages in the right medial posterior temporal lobe.   2.  There is an area of subacute hemorrhage with surrounding white matter edema in the left thalamus. This finding is noted on the previous CT scan dated 1/27/2022.   3.  Mild cerebral volume loss.   4.  Mild chronic microvascular ischemic disease.      CT-HEAD W/O   Final Result      1. Developing hypodensity in the right temporal occipital region consistent with acute infarct.   2. Tiny foci of hemorrhage in the right insular ribbon and possible tiny foci of subarachnoid hemorrhage or contrast staining in the right parietal and temporo-occipital regions.      These findings were messaged via Voalte with  CHARMAINEY M GONDA on 1/28/2022 11:58 AM.            US-EXTREMITY VENOUS LOWER BILAT   Final Result      DX-CHEST-PORTABLE (1 VIEW)   Final Result      Patchy airspace process consistent with edema or atelectasis      IR-THROMBO MECHANICAL ARTERY,INIT   Final Result      1.  Occluded inferior M2, superior M2 and some of the M3 branches.   2.  The fetal right PCA demonstrates thrombus at the middle of the procedure.   3.  Aspiration thrombectomy- inferior M2-RED 62-patent branches.   4.  Aspiration thrombectomy-superior M2-RED 62-patent branches.   5.  Aspiration thrombectomy-fetal right posterior cerebral artery-RED 62-patent branches.   6.  The final TICI score:2b      CT-CTA NECK WITH & W/O-POST PROCESSING   Final Result   Addendum 1 of 1   Addendum:   There is a small nonocclusive arterial thrombus within the right    innominate artery. This is seen on series 9 image 116.      These findings were discussed with REMY CARRERO by Dr. Rokc on    1/27/2022.      Final      1.  No hemodynamically significant stenosis of the neck arteries.   2.  Bilateral ill-defined pulmonary opacities in keeping with Covid pneumonia.      CT-CTA HEAD WITH & W/O-POST PROCESS   Final Result      1.  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            Comment: Results discussed with Dr. Carrero at approximately 3:35 PM      CT-CEREBRAL PERFUSION ANALYSIS   Final Result      1.  Nondiagnostic scan secondary to patient motion.      CT-HEAD W/O   Final Result         NO ACUTE ABNORMALITIES ARE NOTED ON CT SCAN OF THE HEAD.                    Assessment/Plan  * Acute ischemic stroke (HCC)- (present on admission)  Assessment & Plan  Atorvastatin 80mg q HS  Now on Eliquis  BP monitoring  Neurochecks closely while on heparin drip and MRI finding of hemorrhage.  Decreased to every 4 hours, transfer to neurology  Neurology consulting.  Aspiration precautions  SLP/PT/OT  COVID recovered on 2/3   Rehab to re-eval the  pt since the pt is Covid recovered.    Type 2 diabetes mellitus with neurologic complication, without long-term current use of insulin (HCC)- (present on admission)  Assessment & Plan  1/29 A1c:6.6  Monitor accuchecks.  SSI if needed  Diabetic diet.    COVID-19  Assessment & Plan  Isolation precautions.  Supportive care  COVID recovered on 2/3   P     Plan  Transition to Eliquis  Continued efforts with PT OT speech therapy  GDMT, blood pressure control  Anticoagulation  Await rehab decisions  See orders  Medically complex high risk patient    VTE prophylaxis: therapeutic anticoagulation with Heparin    I have performed a physical exam and reviewed and updated ROS and Plan today (2/5/2022). In review of yesterday's note (2/4/2022), there are no changes except as documented above.      Please note that this dictation was created using voice recognition software. I have made every reasonable attempt to correct obvious errors, but I expect that there are errors of grammar and possibly context that I did not discover before finalizing the note.

## 2022-02-05 NOTE — CARE PLAN
The patient is Stable - Low risk of patient condition declining or worsening    Shift Goals  Clinical Goals: safety  Patient Goals: ambulate  Family Goals: Improvements in defecits    Progress made toward(s) clinical / shift goals:  Patient at risk for falls due to stroke, bed alarm on, walker out of sight, nonskid socks on, call light within reach, personal belongings within reach, toileting offered.       Patient is not progressing towards the following goals:

## 2022-02-05 NOTE — CARE PLAN
The patient is Stable - Low risk of patient condition declining or worsening    Shift Goals  Clinical Goals: Monitor neuro status/Safety  Patient Goals: Sleep  Family Goals: Improvements in defecits     Progress made toward(s) clinical / shift goals: Q4hr neuro checks are in place; patient is A&Ox4. Fall precautions are in place - treaded slippers are on, fall risk bracelet is on, bed alarm is on, bed is low and locked and fall risk sign is outside of door.     Patient is not progressing towards the following goals:    Problem: Urinary Elimination  Goal: Establish and maintain regular urinary output  Outcome: Not Progressing  Note: Patient instructed to press call light numerous times, however patient continues to have episodes of incontinence.

## 2022-02-05 NOTE — PROGRESS NOTES
Assumed care of patient at 1900. Patient is A&Ox4 and Q4hr neuro checks are being completed. Encouraging Q2hr turns, patient is on SHEY mattress to maintain skin integrity. Bed is low and locked, bed alarm is on, call light is within reach, hourly rounding continues.

## 2022-02-06 PROCEDURE — 700102 HCHG RX REV CODE 250 W/ 637 OVERRIDE(OP): Performed by: STUDENT IN AN ORGANIZED HEALTH CARE EDUCATION/TRAINING PROGRAM

## 2022-02-06 PROCEDURE — 770001 HCHG ROOM/CARE - MED/SURG/GYN PRIV*

## 2022-02-06 PROCEDURE — 700102 HCHG RX REV CODE 250 W/ 637 OVERRIDE(OP): Performed by: NURSE PRACTITIONER

## 2022-02-06 PROCEDURE — 700102 HCHG RX REV CODE 250 W/ 637 OVERRIDE(OP): Performed by: HOSPITALIST

## 2022-02-06 PROCEDURE — A9270 NON-COVERED ITEM OR SERVICE: HCPCS | Performed by: NURSE PRACTITIONER

## 2022-02-06 PROCEDURE — A9270 NON-COVERED ITEM OR SERVICE: HCPCS | Performed by: HOSPITALIST

## 2022-02-06 PROCEDURE — A9270 NON-COVERED ITEM OR SERVICE: HCPCS | Performed by: STUDENT IN AN ORGANIZED HEALTH CARE EDUCATION/TRAINING PROGRAM

## 2022-02-06 PROCEDURE — 99232 SBSQ HOSP IP/OBS MODERATE 35: CPT | Performed by: STUDENT IN AN ORGANIZED HEALTH CARE EDUCATION/TRAINING PROGRAM

## 2022-02-06 RX ADMIN — DOCUSATE SODIUM 50 MG AND SENNOSIDES 8.6 MG 2 TABLET: 8.6; 5 TABLET, FILM COATED ORAL at 04:26

## 2022-02-06 RX ADMIN — AMLODIPINE BESYLATE 5 MG: 5 TABLET ORAL at 04:26

## 2022-02-06 RX ADMIN — ATORVASTATIN CALCIUM 80 MG: 40 TABLET, FILM COATED ORAL at 16:15

## 2022-02-06 RX ADMIN — APIXABAN 5 MG: 5 TABLET, FILM COATED ORAL at 04:26

## 2022-02-06 RX ADMIN — CALCIUM CARBONATE 500 MG: 500 TABLET, CHEWABLE ORAL at 11:58

## 2022-02-06 RX ADMIN — APIXABAN 5 MG: 5 TABLET, FILM COATED ORAL at 16:15

## 2022-02-06 RX ADMIN — CALCIUM CARBONATE 500 MG: 500 TABLET, CHEWABLE ORAL at 07:39

## 2022-02-06 RX ADMIN — CALCIUM CARBONATE 500 MG: 500 TABLET, CHEWABLE ORAL at 16:14

## 2022-02-06 ASSESSMENT — ENCOUNTER SYMPTOMS
SHORTNESS OF BREATH: 0
MYALGIAS: 0
PALPITATIONS: 0
HEADACHES: 0
FOCAL WEAKNESS: 1
MUSCULOSKELETAL NEGATIVE: 1
SORE THROAT: 0
NAUSEA: 0
CONSTIPATION: 0
CARDIOVASCULAR NEGATIVE: 1
WEAKNESS: 1
SINUS PAIN: 0
SPUTUM PRODUCTION: 0
NERVOUS/ANXIOUS: 0
DIZZINESS: 0
WHEEZING: 0
SPEECH CHANGE: 1
ABDOMINAL PAIN: 0
BLURRED VISION: 0
DOUBLE VISION: 0
DIARRHEA: 0
COUGH: 0
CHILLS: 0
EYES NEGATIVE: 1
FEVER: 0
VOMITING: 0
RESPIRATORY NEGATIVE: 1
GASTROINTESTINAL NEGATIVE: 1

## 2022-02-06 ASSESSMENT — PAIN DESCRIPTION - PAIN TYPE
TYPE: ACUTE PAIN
TYPE: ACUTE PAIN

## 2022-02-06 NOTE — PROGRESS NOTES
Hospital Medicine Daily Progress Note    Date of Service  2/6/2022    Chief Complaint  Geoff Scott is a 66 y.o. male admitted 1/27/2022 with an acute CVA    Hospital Course  This is a unfortunate 66-year-old male with a history of right below-knee amputation, prior stroke, presented on 1/27, COVID-19 positive, acute onset of left-sided weakness and dysarthria, the patient initially received IV TPA and underwent endovascular clot retrieval, he was noted to have hemorrhagic transformation of the stroke, on 1/29 had acute neuro changes again with right gaze deviation and worsening dysarthria, the patient taken back to the IR suite for additional thrombectomy of the M2 and P1 segment, started on a heparin drip.  Neurology consulting, the patient be continued  Left-sided  weakness, left facial droop and dysarthria.    Interval Problem Update  Patient was seen and examined today.  No acute events overnight.  No active complaint a.m. rounds.  Patient is awake, pleasant and following commands, answering questions appropriately.   Vital stable  COVID recovered on 2/3   PT/OT   Rehab following   SNF pending - medically cleared     I have personally seen and examined the patient at bedside. I discussed the plan of care with patient, bedside RN and charge RN.    Consultants/Specialty  neurology    Code Status  Full Code    Disposition  Patient is medically cleared for discharge.   Anticipate discharge to to skilled nursing facility.  Versus acute rehab  I have placed the appropriate orders for post-discharge needs.    Review of Systems  Review of Systems   Constitutional: Positive for malaise/fatigue. Negative for chills and fever.   HENT: Negative.  Negative for congestion, ear discharge, ear pain, sinus pain and sore throat.    Eyes: Negative.  Negative for blurred vision and double vision.   Respiratory: Negative.  Negative for cough, sputum production, shortness of breath and wheezing.    Cardiovascular: Negative.   Negative for chest pain, palpitations and leg swelling.   Gastrointestinal: Negative.  Negative for abdominal pain, constipation, diarrhea, nausea and vomiting.   Genitourinary: Negative.  Negative for dysuria, frequency and urgency.   Musculoskeletal: Negative.  Negative for myalgias.   Skin: Negative.    Neurological: Positive for speech change, focal weakness and weakness. Negative for dizziness and headaches.   Endo/Heme/Allergies: Negative.    Psychiatric/Behavioral: The patient is not nervous/anxious.    All other systems reviewed and are negative.       Physical Exam  Temp:  [36.8 °C (98.2 °F)-37 °C (98.6 °F)] 36.9 °C (98.4 °F)  Pulse:  [66-98] 83  Resp:  [16-18] 16  BP: (106-131)/(69-83) 116/83  SpO2:  [94 %-95 %] 94 %    Physical Exam  Vitals and nursing note reviewed.   Constitutional:       Appearance: He is well-developed.   HENT:      Head: Normocephalic.      Comments: Left facial droop  Eyes:      Pupils: Pupils are equal, round, and reactive to light.   Cardiovascular:      Rate and Rhythm: Normal rate and regular rhythm.      Heart sounds: Normal heart sounds.   Pulmonary:      Effort: Pulmonary effort is normal.   Abdominal:      General: Bowel sounds are normal.      Palpations: Abdomen is soft.   Genitourinary:     Penis: Normal.       Rectum: Normal.   Musculoskeletal:         General: Normal range of motion.      Cervical back: Normal range of motion.      Comments: Right BKA   Skin:     General: Skin is warm.   Neurological:      Mental Status: He is alert and oriented to person, place, and time.      Motor: Weakness present.      Coordination: Coordination abnormal.         Fluids    Intake/Output Summary (Last 24 hours) at 2/6/2022 0936  Last data filed at 2/6/2022 0433  Gross per 24 hour   Intake 360 ml   Output 225 ml   Net 135 ml       Laboratory                        Imaging  DX-CHEST-PORTABLE (1 VIEW)   Final Result         1.  Patchy pulmonary infiltrates, greater on the right.  Similar compared to prior study.      CT-CTA HEAD WITH & W/O-POST PROCESS   Final Result         1.  Previously visualized partial occlusion of right M1 segment has resolved status post thrombectomy.   2.  Persistent occlusion of the right distal MCA branch vessel supplying area of infarct, similar to prior study.   3.  Evolving infarct in the right parietotemporal adnexal lobe region.   4.  Stable hyperdensities in the right sylvian fissure and sulci of the right parietotemporal lobe likely stable hemorrhages.      EC-ECHOCARDIOGRAM LTD W/ CONT   Final Result      IR-THROMBO MECHANICAL ARTERY,INIT   Final Result      1.  Filling defects at the inferior M2 segment and fetal right posterior cerebral artery.   2.  Spontaneous resolution of inferior M2 segment filling defect.   3.  Mechanical thrombectomy of fetal posterior cerebral artery using RED 62. Final angiogram demonstrates patent blood vessels indicating TICI 2b flow.   4.  Successful right internal jugular central venous catheter placement.      IR-APPLE,GROSHONG PLACEMENT >5   Final Result      1.  Filling defects at the inferior M2 segment and fetal right posterior cerebral artery.   2.  Spontaneous resolution of inferior M2 segment filling defect.   3.  Mechanical thrombectomy of fetal posterior cerebral artery using RED 62. Final angiogram demonstrates patent blood vessels indicating TICI 2b flow.   4.  Successful right internal jugular central venous catheter placement.      DX-CHEST-PORTABLE (1 VIEW)   Final Result      1.  Right central line projects over the SVC.   2.  No pneumothorax.   3.  Bilateral airspace opacities compatible with multifocal Covid 19 pneumonia which are not significantly changed.      CT-CEREBRAL PERFUSION ANALYSIS   Final Result      1.  Cerebral blood flow less than 30% likely representing completed infarct = 62 mL.      2.  T Max more than 6 seconds likely representing combination of completed infarct and ischemia = 76 mL.       3.  Mismatched volume likely representing ischemic brain/penumbra = 14 mL      4.  Please note that the cerebral perfusion was performed on the limited brain tissue around the basal ganglia region. Infarct/ischemia outside the CT perfusion sections can be missed in this study.      CT-CTA HEAD WITH & W/O-POST PROCESS   Final Result      Subacute right parieto-occipital infarction without hemorrhagic transformation. The MCA branch vessel supplying this again show contrast cut off      Left thalamic vasogenic edema and subacute hemorrhage exerts mild mass effect, slightly worse than on comparison      New eccentric nonocclusive filling defects in the distal right internal carotid and M1 segments concerning for small thrombus favored over spasm      Similar distal right vertebral artery segmental narrowing may be from spasm or chronic. No focal occlusion and the left vertebral artery is dominant with normal vertebrobasilar confluence      MR-BRAIN-W/O   Final Result      1.  Multifocal areas of acute infarcts in the right frontal, temporal and parietal lobes. . The gradient echo images demonstrates linear area of magnetic susceptibility artifact at the insular cortex likely representing small area hemorrhagic    transformation or thrombus within the vessels.There are petechial microhemorrhages in the right medial posterior temporal lobe.   2.  There is an area of subacute hemorrhage with surrounding white matter edema in the left thalamus. This finding is noted on the previous CT scan dated 1/27/2022.   3.  Mild cerebral volume loss.   4.  Mild chronic microvascular ischemic disease.      CT-HEAD W/O   Final Result      1. Developing hypodensity in the right temporal occipital region consistent with acute infarct.   2. Tiny foci of hemorrhage in the right insular ribbon and possible tiny foci of subarachnoid hemorrhage or contrast staining in the right parietal and temporo-occipital regions.      These findings  were messaged via Voalte with JEREMY M GONDA on 1/28/2022 11:58 AM.            US-EXTREMITY VENOUS LOWER BILAT   Final Result      DX-CHEST-PORTABLE (1 VIEW)   Final Result      Patchy airspace process consistent with edema or atelectasis      IR-THROMBO MECHANICAL ARTERY,INIT   Final Result      1.  Occluded inferior M2, superior M2 and some of the M3 branches.   2.  The fetal right PCA demonstrates thrombus at the middle of the procedure.   3.  Aspiration thrombectomy- inferior M2-RED 62-patent branches.   4.  Aspiration thrombectomy-superior M2-RED 62-patent branches.   5.  Aspiration thrombectomy-fetal right posterior cerebral artery-RED 62-patent branches.   6.  The final TICI score:2b      CT-CTA NECK WITH & W/O-POST PROCESSING   Final Result   Addendum 1 of 1   Addendum:   There is a small nonocclusive arterial thrombus within the right    innominate artery. This is seen on series 9 image 116.      These findings were discussed with REMY CARRERO by Dr. Rock on    1/27/2022.      Final      1.  No hemodynamically significant stenosis of the neck arteries.   2.  Bilateral ill-defined pulmonary opacities in keeping with Covid pneumonia.      CT-CTA HEAD WITH & W/O-POST PROCESS   Final Result      1.  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            Comment: Results discussed with Dr. Carrero at approximately 3:35 PM      CT-CEREBRAL PERFUSION ANALYSIS   Final Result      1.  Nondiagnostic scan secondary to patient motion.      CT-HEAD W/O   Final Result         NO ACUTE ABNORMALITIES ARE NOTED ON CT SCAN OF THE HEAD.                    Assessment/Plan  * Acute ischemic stroke (HCC)- (present on admission)  Assessment & Plan  Atorvastatin 80mg q HS  Now on Eliquis  BP monitoring  Neurochecks closely while on heparin drip and MRI finding of hemorrhage.  Decreased to every 4 hours, transfer to neurology  Neurology consulting.  Aspiration precautions  SLP/PT/OT  COVID recovered  on 2/3   Rehab to re-eval the pt since the pt is Covid recovered.    Type 2 diabetes mellitus with neurologic complication, without long-term current use of insulin (HCC)- (present on admission)  Assessment & Plan  1/29 A1c:6.6  Monitor accuchecks.  SSI if needed  Diabetic diet.    COVID-19  Assessment & Plan  On RA  COVID recovered on 2/3        VTE prophylaxis: therapeutic anticoagulation with Eliquis     I have performed a physical exam and reviewed and updated ROS and Plan today (2/6/2022). In review of yesterday's note (2/5/2022), there are no changes except as documented above.

## 2022-02-06 NOTE — CARE PLAN
"  SUBJECTIVE:     Lazaro Colmenares is a 4 day old male, here for a routine health maintenance visit,   accompanied by his mother and maternal grandmother.    Patient was roomed by: Laevrne ALVARADO CMA    Do you have any forms to be completed?  no    BIRTH HISTORY  Birth History     Birth     Length: 1' 9\" (0.533 m)     Weight: 8 lb 12 oz (3.969 kg)     HC 13.25\" (33.7 cm)     Apgar     One: 2     Five: 8     Ten: 9     Delivery Method: , Low Transverse     Gestation Age: 39 4/7 wks     Duration of Labor: 1st: 6h 2m / 2nd: 3h 21m     PNP called to delivery for  for arrest of decent, mom under general anesthesia due to high spinal anesthetic.  Infant delivered with assist of pushing from below and brought to the warmer for resuscitative efforts, meconium noted in fluid.  HR <60, baby blue and limp with no spontaneous respiratory drive.  PPV started at 30 seconds and continued for 1 minute, HR increased to >100 and baby started to pink up.  PPV continued until 2.5 minutes of life when baby started crying with some spontaneous respirations, CPAP started at that time and continued until about 4 minutes of life.  At 5 minutes, infant was pink with sats 95%, good cry, no retractions or grunting, intermittent nasal flaring noted.  Fluid noted in mouth, deleed for 3 mls.  Apgars were 2, 8, & 9.       Hepatitis B # 1 given in nursery: yes   metabolic screening: Results not known at this time--FAX request to [facility] at [phone]   hearing screen: Passed--data reviewed     SOCIAL HISTORY  Child lives with: mother and father  Who takes care of your infant: mother and maternal grandmother  Language(s) spoken at home: English  Recent family changes/social stressors: none noted    SAFETY/HEALTH RISK  Does anyone who takes care of your child smoke?:  No  TB exposure:  No  Is your car seat less than 6 years old, in the back seat, rear-facing, 5-point restraint:  Yes    DAILY ACTIVITIES  WATER SOURCE: " The patient is Stable - Low risk of patient condition declining or worsening    Shift Goals  Clinical Goals: increase PO intake, comfort  Patient Goals: up to chair  Family Goals: rest    Progress made toward(s) clinical / shift goals:  Patient had one episode of Nocturnal incontinence overnight, linen change performed as well as a partial bed bath. Patient denied any pain through shift. Family called around midnight concerned patient may remove PIV, but there has been no evidence of tampering with the PIV. Call light in reach, bed low and locked, hourly rounding in place.    Patient is not progressing towards the following goals:       "city water    NUTRITION  Breastfeeding:exclusively breastfeeding  And supplemental feedings    SLEEP  Arrangements:    franciscaantwon    sleeps on back  Problems    none    ELIMINATION  Stools:    normal breast milk stools  Urination:    normal wet diapers    QUESTIONS/CONCERNS: None  Doing well-was treated for trevor-need to recheck. Eating well-lots of milk-supplementing.  ==================      PROBLEM LISTBirth History   Diagnosis     Single liveborn, born in hospital, delivered by  delivery      hyperbilirubinemia       MEDICATIONS  No current outpatient prescriptions on file.        ALLERGY  No Known Allergies    IMMUNIZATIONS  Immunization History   Administered Date(s) Administered     HepB-peds 2017       HEALTH HISTORY  No major problems since discharge from nursery    DEVELOPMENT  Milestones (by observation/ exam/ report. 75-90% ile):   PERSONAL/ SOCIAL/COGNITIVE:    Regards face    Spontaneous smile  LANGUAGE:    Vocalizes    Responds to sound  GROSS MOTOR:    Equal movements    Lifts head  FINE MOTOR/ ADAPTIVE:    Reflexive grasp    Visually fixates    ROS  GENERAL: See health history, nutrition and daily activities   SKIN:  No  significant rash or lesions.  HEENT: Hearing/vision: see above.  No eye, nasal, ear concerns  RESP: No cough or other concerns  CV: No concerns  GI: See nutrition and elimination. No concerns.  : See elimination. No concerns  NEURO: See development    OBJECTIVE:                                                    EXAM  Pulse 132  Temp 98.3  F (36.8  C) (Rectal)  Ht 1' 9.75\" (0.552 m)  Wt 8 lb 9.5 oz (3.898 kg)  HC 14.25\" (36.2 cm)  SpO2 99%  BMI 12.77 kg/m2  >99 %ile based on WHO (Boys, 0-2 years) length-for-age data using vitals from 2017.  78 %ile based on WHO (Boys, 0-2 years) weight-for-age data using vitals from 2017.  86 %ile based on WHO (Boys, 0-2 years) head circumference-for-age data using vitals from 2017.  GENERAL: Active, alert, " in no acute distress.  SKIN: Clear. No significant rash, abnormal pigmentation or lesions  HEAD: Normocephalic. Normal fontanels and sutures.  EYES: Conjunctivae and cornea normal. Red reflexes present bilaterally.  EARS: Normal canals. Tympanic membranes are normal; gray and translucent.  NOSE: Normal without discharge.  MOUTH/THROAT: Clear. No oral lesions.  NECK: Supple, no masses.  LYMPH NODES: No adenopathy  LUNGS: Clear. No rales, rhonchi, wheezing or retractions  HEART: Regular rhythm. Normal S1/S2. No murmurs. Normal femoral pulses.  ABDOMEN: Soft, non-tender, not distended, no masses or hepatosplenomegaly. Normal umbilicus and bowel sounds.   GENITALIA: Normal male external genitalia. Hernando stage I,  Testes descended bilateraly, no hernia or hydrocele.    EXTREMITIES: Hips normal with negative Ortolani and Patricia. Symmetric creases and  no deformities  NEUROLOGIC: Normal tone throughout. Normal reflexes for age    ASSESSMENT/PLAN:                                                    1. Fetal and  jaundice  Bili OK-will recheck in 2 days.  Continue to supplement when not feeding well.  - Bilirubin Direct and Total    Anticipatory Guidance  The following topics were discussed:  SOCIAL/FAMILY  NUTRITION:  HEALTH/ SAFETY:    Preventive Care Plan  Immunizations     Reviewed, up to date  Referrals/Ongoing Specialty care: No   See other orders in EpicCare    FOLLOW-UP:      in 2 days for bili recheck.    Mariella Smith MD, MD  [facility]

## 2022-02-06 NOTE — CARE PLAN
The patient is Stable - Low risk of patient condition declining or worsening    Shift Goals  Clinical Goals: safety, mobilize  Patient Goals: walk to bathroom  Family Goals: n/a    Progress made toward(s) clinical / shift goals:     Problem: Dysphagia  Goal: Dysphagia will improve  Outcome: Progressing  Note: Pt on modified diet, SLP following.      Problem: Mobility - Stroke  Goal: Patient's capacity to carry out activities will improve  Outcome: Progressing  Note: Pt up to chair for all meals and able to assist in shower and other ADLs.        Patient is not progressing towards the following goals: n/a

## 2022-02-07 PROCEDURE — A9270 NON-COVERED ITEM OR SERVICE: HCPCS | Performed by: NURSE PRACTITIONER

## 2022-02-07 PROCEDURE — 700102 HCHG RX REV CODE 250 W/ 637 OVERRIDE(OP): Performed by: STUDENT IN AN ORGANIZED HEALTH CARE EDUCATION/TRAINING PROGRAM

## 2022-02-07 PROCEDURE — 97535 SELF CARE MNGMENT TRAINING: CPT | Mod: CO

## 2022-02-07 PROCEDURE — A9270 NON-COVERED ITEM OR SERVICE: HCPCS | Performed by: HOSPITALIST

## 2022-02-07 PROCEDURE — A9270 NON-COVERED ITEM OR SERVICE: HCPCS | Performed by: STUDENT IN AN ORGANIZED HEALTH CARE EDUCATION/TRAINING PROGRAM

## 2022-02-07 PROCEDURE — 700102 HCHG RX REV CODE 250 W/ 637 OVERRIDE(OP): Performed by: NURSE PRACTITIONER

## 2022-02-07 PROCEDURE — 700102 HCHG RX REV CODE 250 W/ 637 OVERRIDE(OP): Performed by: HOSPITALIST

## 2022-02-07 PROCEDURE — 770001 HCHG ROOM/CARE - MED/SURG/GYN PRIV*

## 2022-02-07 PROCEDURE — 99232 SBSQ HOSP IP/OBS MODERATE 35: CPT | Performed by: STUDENT IN AN ORGANIZED HEALTH CARE EDUCATION/TRAINING PROGRAM

## 2022-02-07 RX ADMIN — APIXABAN 5 MG: 5 TABLET, FILM COATED ORAL at 04:39

## 2022-02-07 RX ADMIN — AMLODIPINE BESYLATE 5 MG: 5 TABLET ORAL at 04:39

## 2022-02-07 RX ADMIN — CALCIUM CARBONATE 500 MG: 500 TABLET, CHEWABLE ORAL at 16:29

## 2022-02-07 RX ADMIN — APIXABAN 5 MG: 5 TABLET, FILM COATED ORAL at 16:28

## 2022-02-07 RX ADMIN — CALCIUM CARBONATE 500 MG: 500 TABLET, CHEWABLE ORAL at 11:48

## 2022-02-07 RX ADMIN — DOCUSATE SODIUM 50 MG AND SENNOSIDES 8.6 MG 2 TABLET: 8.6; 5 TABLET, FILM COATED ORAL at 16:29

## 2022-02-07 RX ADMIN — DOCUSATE SODIUM 50 MG AND SENNOSIDES 8.6 MG 2 TABLET: 8.6; 5 TABLET, FILM COATED ORAL at 04:39

## 2022-02-07 RX ADMIN — CALCIUM CARBONATE 500 MG: 500 TABLET, CHEWABLE ORAL at 09:16

## 2022-02-07 RX ADMIN — ATORVASTATIN CALCIUM 80 MG: 40 TABLET, FILM COATED ORAL at 16:29

## 2022-02-07 ASSESSMENT — PAIN DESCRIPTION - PAIN TYPE
TYPE: ACUTE PAIN
TYPE: ACUTE PAIN

## 2022-02-07 ASSESSMENT — ENCOUNTER SYMPTOMS
PALPITATIONS: 0
DIZZINESS: 0
SPEECH CHANGE: 1
CONSTIPATION: 0
FOCAL WEAKNESS: 1
NERVOUS/ANXIOUS: 0
CHILLS: 0
VOMITING: 0
DOUBLE VISION: 0
BLURRED VISION: 0
MUSCULOSKELETAL NEGATIVE: 1
SORE THROAT: 0
SHORTNESS OF BREATH: 0
RESPIRATORY NEGATIVE: 1
DIARRHEA: 0
CARDIOVASCULAR NEGATIVE: 1
NAUSEA: 0
WHEEZING: 0
ABDOMINAL PAIN: 0
SPUTUM PRODUCTION: 0
SINUS PAIN: 0
GASTROINTESTINAL NEGATIVE: 1
FEVER: 0
MYALGIAS: 0
WEAKNESS: 1
HEADACHES: 0
EYES NEGATIVE: 1
COUGH: 0

## 2022-02-07 ASSESSMENT — COGNITIVE AND FUNCTIONAL STATUS - GENERAL
PERSONAL GROOMING: A LITTLE
SUGGESTED CMS G CODE MODIFIER DAILY ACTIVITY: CK
DRESSING REGULAR UPPER BODY CLOTHING: A LITTLE
TOILETING: A LITTLE
EATING MEALS: A LITTLE
HELP NEEDED FOR BATHING: A LITTLE
DAILY ACTIVITIY SCORE: 18
DRESSING REGULAR LOWER BODY CLOTHING: A LITTLE

## 2022-02-07 NOTE — CARE PLAN
The patient is Stable - Low risk of patient condition declining or worsening    Shift Goals  Clinical Goals: up to chair for all meals, ambulate to bathroom  Patient Goals: go home  Family Goals: n/a    Progress made toward(s) clinical / shift goals:  Pt sitting in chair throughout the day, able to ambulate to bathroom with FWW and prosthetic.       Problem: Knowledge Deficit - Stroke Education  Goal: Patient's knowledge of stroke and risk factors will improve  Outcome: Progressing     Problem: Neuro Status  Goal: Neuro status will remain stable or improve  Outcome: Progressing     Problem: Self Care  Goal: Patient will have the ability to perform ADLs independently or with assistance (bathe, groom, dress, toilet and feed)  Outcome: Progressing       Patient is not progressing towards the following goals: n/a

## 2022-02-07 NOTE — THERAPY
Occupational Therapy  Daily Treatment     Patient Name: Geoff Scott  Age:  66 y.o., Sex:  male  Medical Record #: [id number]  Today's Date: 2/7/2022       Precautions: Fall Risk,Swallow Precautions ( See Comments)  Comments: hx of R BKA     Assessment    Pt seen for OT tx. Continues to be limited by decreased activity tolerance, balance deficits and LUE weakness impacting ability to complete ADLs and ADL transfers independently. Amb w/ FWW in room w/ min A for balance and assistance keeping L hand on FWW d/t weak grasp. Pt running into objects on L side w/ FWW d/t inattention. Min A toilet transfer. Pt reports he was able to don his prosthetic w/o assistance but demonstrated difficulty adjusting sleeve d/t L hand weakness and FM deficits. Will continue to benefit from OT services while in house.     Plan    Continue current treatment plan.    DC Equipment Recommendations: Unable to determine at this time  Discharge Recommendations: Recommend post-acute placement for additional occupational therapy services prior to discharge home       02/07/22 1018   Cognition    Cognition / Consciousness X   Safety Awareness Impaired   Attention Impaired   Active ROM Upper Body   Active ROM Upper Body  X   Comments LUE ROM d/t weakness   Strength Upper Body   Upper Body Strength  X   Comments LUE weaker, L gross grasp weak    Balance   Sitting Balance (Static) Fair -   Sitting Balance (Dynamic) Fair -   Standing Balance (Static) Fair -   Standing Balance (Dynamic) Fair -   Weight Shift Sitting Fair   Weight Shift Standing Fair   Bed Mobility    Comments up in chair pre and post session    Activities of Daily Living   Grooming Minimal Assist;Standing   Upper Body Dressing Minimal Assist   Lower Body Dressing Minimal Assist   Toileting Minimal Assist   Functional Mobility   Sit to Stand Minimal Assist   Bed, Chair, Wheelchair Transfer Minimal Assist   Toilet Transfers Minimal Assist   Short Term Goals   Short Term Goal # 1  attend to L side w/min v/cs during ADL/txfs   Goal Outcome # 1 Progressing as expected   Short Term Goal # 2 LB dressing with min A   Goal Outcome # 2 Progressing as expected   Short Term Goal # 3 BSC txf with min A   Goal Outcome # 3 Progressing as expected   Short Term Goal # 4 use of LUE w/o v/cs during BUE seated g/h req min A   Goal Outcome # 4 Progressing as expected   Anticipated Discharge Equipment and Recommendations   DC Equipment Recommendations Unable to determine at this time   Discharge Recommendations Recommend post-acute placement for additional occupational therapy services prior to discharge home

## 2022-02-07 NOTE — CARE PLAN
The patient is Stable - Low risk of patient condition declining or worsening    Shift Goals  Clinical Goals: reduce NOC incontinence  Patient Goals: discharge  Family Goals: discharge    Progress made toward(s) clinical / shift goals:  Patient had one episode of incontinence overnight, wondering why it was happening. Pt educated on side effects of condition. Pt family caught feeding patient chicken nuggets and cheeseburgers from [employer] and were asked to stop feeding the pt such foods. Educated family on risk of aspiration from stroke. Strip and frame alarm on, call light in reach.     Patient is not progressing towards the following goals:

## 2022-02-07 NOTE — PROGRESS NOTES
Hospital Medicine Daily Progress Note    Date of Service  2/7/2022    Chief Complaint  Geoff Scott is a 66 y.o. male admitted 1/27/2022 with an acute CVA    Hospital Course  This is a unfortunate 66-year-old male with a history of right below-knee amputation, prior stroke, presented on 1/27, COVID-19 positive, acute onset of left-sided weakness and dysarthria, the patient initially received IV TPA and underwent endovascular clot retrieval, he was noted to have hemorrhagic transformation of the stroke, on 1/29 had acute neuro changes again with right gaze deviation and worsening dysarthria, the patient taken back to the IR suite for additional thrombectomy of the M2 and P1 segment, started on a heparin drip.  Neurology consulting, the patient be continued  Left-sided  weakness, left facial droop and dysarthria.    Interval Problem Update  Patient was seen and examined today.  No acute events overnight.  No active complaint a.m. rounds.  Patient is awake, pleasant and following commands, answering questions appropriately.   Vital stable  COVID recovered on 2/3   PT/OT   Rehab following   SNF pending - medically cleared   He has no insurance.     I have personally seen and examined the patient at bedside. I discussed the plan of care with patient, bedside RN, charge RN and .    Consultants/Specialty  neurology    Code Status  Full Code    Disposition  Patient is medically cleared for discharge.   Anticipate discharge to to skilled nursing facility.  Versus acute rehab  I have placed the appropriate orders for post-discharge needs.    Review of Systems  Review of Systems   Constitutional: Positive for malaise/fatigue. Negative for chills and fever.   HENT: Negative.  Negative for congestion, ear discharge, ear pain, sinus pain and sore throat.    Eyes: Negative.  Negative for blurred vision and double vision.   Respiratory: Negative.  Negative for cough, sputum production, shortness of breath and wheezing.     Cardiovascular: Negative.  Negative for chest pain, palpitations and leg swelling.   Gastrointestinal: Negative.  Negative for abdominal pain, constipation, diarrhea, nausea and vomiting.   Genitourinary: Negative.  Negative for dysuria, frequency and urgency.   Musculoskeletal: Negative.  Negative for myalgias.   Skin: Negative.    Neurological: Positive for speech change, focal weakness and weakness. Negative for dizziness and headaches.   Endo/Heme/Allergies: Negative.    Psychiatric/Behavioral: The patient is not nervous/anxious.    All other systems reviewed and are negative.       Physical Exam  Temp:  [36.5 °C (97.7 °F)-37 °C (98.6 °F)] 36.5 °C (97.7 °F)  Pulse:  [69-93] 81  Resp:  [16-18] 18  BP: (103-129)/(69-85) 111/79  SpO2:  [90 %-92 %] 92 %    Physical Exam  Vitals and nursing note reviewed.   Constitutional:       Appearance: He is well-developed.   HENT:      Head: Normocephalic.      Comments: Left facial droop  Eyes:      Pupils: Pupils are equal, round, and reactive to light.   Cardiovascular:      Rate and Rhythm: Normal rate and regular rhythm.      Heart sounds: Normal heart sounds.   Pulmonary:      Effort: Pulmonary effort is normal.   Abdominal:      General: Bowel sounds are normal.      Palpations: Abdomen is soft.   Genitourinary:     Penis: Normal.       Rectum: Normal.   Musculoskeletal:         General: Normal range of motion.      Cervical back: Normal range of motion.      Comments: Right BKA   Skin:     General: Skin is warm.   Neurological:      Mental Status: He is alert and oriented to person, place, and time.      Motor: Weakness present.      Coordination: Coordination abnormal.         Fluids    Intake/Output Summary (Last 24 hours) at 2/7/2022 1226  Last data filed at 2/7/2022 0444  Gross per 24 hour   Intake --   Output 400 ml   Net -400 ml       Laboratory                        Imaging  DX-CHEST-PORTABLE (1 VIEW)   Final Result         1.  Patchy pulmonary infiltrates,  greater on the right. Similar compared to prior study.      CT-CTA HEAD WITH & W/O-POST PROCESS   Final Result         1.  Previously visualized partial occlusion of right M1 segment has resolved status post thrombectomy.   2.  Persistent occlusion of the right distal MCA branch vessel supplying area of infarct, similar to prior study.   3.  Evolving infarct in the right parietotemporal adnexal lobe region.   4.  Stable hyperdensities in the right sylvian fissure and sulci of the right parietotemporal lobe likely stable hemorrhages.      EC-ECHOCARDIOGRAM LTD W/ CONT   Final Result      IR-THROMBO MECHANICAL ARTERY,INIT   Final Result      1.  Filling defects at the inferior M2 segment and fetal right posterior cerebral artery.   2.  Spontaneous resolution of inferior M2 segment filling defect.   3.  Mechanical thrombectomy of fetal posterior cerebral artery using RED 62. Final angiogram demonstrates patent blood vessels indicating TICI 2b flow.   4.  Successful right internal jugular central venous catheter placement.      IR-APPLE,GROSHONG PLACEMENT >5   Final Result      1.  Filling defects at the inferior M2 segment and fetal right posterior cerebral artery.   2.  Spontaneous resolution of inferior M2 segment filling defect.   3.  Mechanical thrombectomy of fetal posterior cerebral artery using RED 62. Final angiogram demonstrates patent blood vessels indicating TICI 2b flow.   4.  Successful right internal jugular central venous catheter placement.      DX-CHEST-PORTABLE (1 VIEW)   Final Result      1.  Right central line projects over the SVC.   2.  No pneumothorax.   3.  Bilateral airspace opacities compatible with multifocal Covid 19 pneumonia which are not significantly changed.      CT-CEREBRAL PERFUSION ANALYSIS   Final Result      1.  Cerebral blood flow less than 30% likely representing completed infarct = 62 mL.      2.  T Max more than 6 seconds likely representing combination of completed infarct  and ischemia = 76 mL.      3.  Mismatched volume likely representing ischemic brain/penumbra = 14 mL      4.  Please note that the cerebral perfusion was performed on the limited brain tissue around the basal ganglia region. Infarct/ischemia outside the CT perfusion sections can be missed in this study.      CT-CTA HEAD WITH & W/O-POST PROCESS   Final Result      Subacute right parieto-occipital infarction without hemorrhagic transformation. The MCA branch vessel supplying this again show contrast cut off      Left thalamic vasogenic edema and subacute hemorrhage exerts mild mass effect, slightly worse than on comparison      New eccentric nonocclusive filling defects in the distal right internal carotid and M1 segments concerning for small thrombus favored over spasm      Similar distal right vertebral artery segmental narrowing may be from spasm or chronic. No focal occlusion and the left vertebral artery is dominant with normal vertebrobasilar confluence      MR-BRAIN-W/O   Final Result      1.  Multifocal areas of acute infarcts in the right frontal, temporal and parietal lobes. . The gradient echo images demonstrates linear area of magnetic susceptibility artifact at the insular cortex likely representing small area hemorrhagic    transformation or thrombus within the vessels.There are petechial microhemorrhages in the right medial posterior temporal lobe.   2.  There is an area of subacute hemorrhage with surrounding white matter edema in the left thalamus. This finding is noted on the previous CT scan dated 1/27/2022.   3.  Mild cerebral volume loss.   4.  Mild chronic microvascular ischemic disease.      CT-HEAD W/O   Final Result      1. Developing hypodensity in the right temporal occipital region consistent with acute infarct.   2. Tiny foci of hemorrhage in the right insular ribbon and possible tiny foci of subarachnoid hemorrhage or contrast staining in the right parietal and temporo-occipital regions.       These findings were messaged via Vo[employer] with JEREMY M GONDA on 1/28/2022 11:58 AM.            US-EXTREMITY VENOUS LOWER BILAT   Final Result      DX-CHEST-PORTABLE (1 VIEW)   Final Result      Patchy airspace process consistent with edema or atelectasis      IR-THROMBO MECHANICAL ARTERY,INIT   Final Result      1.  Occluded inferior M2, superior M2 and some of the M3 branches.   2.  The fetal right PCA demonstrates thrombus at the middle of the procedure.   3.  Aspiration thrombectomy- inferior M2-RED 62-patent branches.   4.  Aspiration thrombectomy-superior M2-RED 62-patent branches.   5.  Aspiration thrombectomy-fetal right posterior cerebral artery-RED 62-patent branches.   6.  The final TICI score:2b      CT-CTA NECK WITH & W/O-POST PROCESSING   Final Result   Addendum 1 of 1   Addendum:   There is a small nonocclusive arterial thrombus within the right    innominate artery. This is seen on series 9 image 116.      These findings were discussed with REMY CARRERO by Dr. Rock on    1/27/2022.      Final      1.  No hemodynamically significant stenosis of the neck arteries.   2.  Bilateral ill-defined pulmonary opacities in keeping with Covid pneumonia.      CT-CTA HEAD WITH & W/O-POST PROCESS   Final Result      1.  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            Comment: Results discussed with Dr. Carrero at approximately 3:35 PM      CT-CEREBRAL PERFUSION ANALYSIS   Final Result      1.  Nondiagnostic scan secondary to patient motion.      CT-HEAD W/O   Final Result         NO ACUTE ABNORMALITIES ARE NOTED ON CT SCAN OF THE HEAD.                    Assessment/Plan  * Acute ischemic stroke (HCC)- (present on admission)  Assessment & Plan  Atorvastatin 80mg q HS  Now on Eliquis  BP monitoring  Neurochecks closely while on heparin drip and MRI finding of hemorrhage.  Decreased to every 4 hours, transfer to neurology  Neurology consulting.  Aspiration  precautions  SLP/PT/OT  COVID recovered on 2/3   Rehab declined.  SNF pending - medically cleared   He has no insurance     Type 2 diabetes mellitus with neurologic complication, without long-term current use of insulin (HCC)- (present on admission)  Assessment & Plan  1/29 A1c:6.6  Monitor accuchecks.  SSI if needed  Diabetic diet.    COVID-19  Assessment & Plan  On RA  COVID recovered on 2/3        VTE prophylaxis: therapeutic anticoagulation with Eliquis     I have performed a physical exam and reviewed and updated ROS and Plan today (2/7/2022). In review of yesterday's note (2/6/2022), there are no changes except as documented above.

## 2022-02-08 PROCEDURE — 97116 GAIT TRAINING THERAPY: CPT | Mod: CQ

## 2022-02-08 PROCEDURE — 770001 HCHG ROOM/CARE - MED/SURG/GYN PRIV*

## 2022-02-08 PROCEDURE — 99231 SBSQ HOSP IP/OBS SF/LOW 25: CPT | Performed by: HOSPITALIST

## 2022-02-08 PROCEDURE — 97530 THERAPEUTIC ACTIVITIES: CPT | Mod: CQ

## 2022-02-08 PROCEDURE — 700102 HCHG RX REV CODE 250 W/ 637 OVERRIDE(OP): Performed by: HOSPITALIST

## 2022-02-08 PROCEDURE — 700102 HCHG RX REV CODE 250 W/ 637 OVERRIDE(OP): Performed by: NURSE PRACTITIONER

## 2022-02-08 PROCEDURE — A9270 NON-COVERED ITEM OR SERVICE: HCPCS | Performed by: NURSE PRACTITIONER

## 2022-02-08 PROCEDURE — A9270 NON-COVERED ITEM OR SERVICE: HCPCS | Performed by: HOSPITALIST

## 2022-02-08 RX ORDER — CALCIUM CARBONATE 500 MG/1
500 TABLET, CHEWABLE ORAL 3 TIMES DAILY PRN
Status: DISCONTINUED | OUTPATIENT
Start: 2022-02-08 | End: 2022-02-10 | Stop reason: HOSPADM

## 2022-02-08 RX ADMIN — APIXABAN 5 MG: 5 TABLET, FILM COATED ORAL at 16:28

## 2022-02-08 RX ADMIN — AMLODIPINE BESYLATE 5 MG: 5 TABLET ORAL at 04:26

## 2022-02-08 RX ADMIN — ATORVASTATIN CALCIUM 80 MG: 40 TABLET, FILM COATED ORAL at 16:28

## 2022-02-08 RX ADMIN — APIXABAN 5 MG: 5 TABLET, FILM COATED ORAL at 04:26

## 2022-02-08 RX ADMIN — DOCUSATE SODIUM 50 MG AND SENNOSIDES 8.6 MG 2 TABLET: 8.6; 5 TABLET, FILM COATED ORAL at 04:26

## 2022-02-08 ASSESSMENT — PAIN DESCRIPTION - PAIN TYPE
TYPE: ACUTE PAIN
TYPE: ACUTE PAIN

## 2022-02-08 ASSESSMENT — ENCOUNTER SYMPTOMS
MYALGIAS: 0
CHILLS: 0
NAUSEA: 0
VOMITING: 0
WEAKNESS: 1
PALPITATIONS: 0
SPEECH CHANGE: 1
DIZZINESS: 0
COUGH: 0
BLURRED VISION: 0
FOCAL WEAKNESS: 1
NERVOUS/ANXIOUS: 0
HEMOPTYSIS: 0
HEADACHES: 0
DOUBLE VISION: 0
FEVER: 0
ABDOMINAL PAIN: 0

## 2022-02-08 ASSESSMENT — COGNITIVE AND FUNCTIONAL STATUS - GENERAL
MOVING TO AND FROM BED TO CHAIR: A LITTLE
STANDING UP FROM CHAIR USING ARMS: A LITTLE
CLIMB 3 TO 5 STEPS WITH RAILING: A LOT
MOVING FROM LYING ON BACK TO SITTING ON SIDE OF FLAT BED: A LOT
WALKING IN HOSPITAL ROOM: A LITTLE
TURNING FROM BACK TO SIDE WHILE IN FLAT BAD: A LITTLE
SUGGESTED CMS G CODE MODIFIER MOBILITY: CK
MOBILITY SCORE: 16

## 2022-02-08 ASSESSMENT — GAIT ASSESSMENTS
GAIT LEVEL OF ASSIST: MINIMAL ASSIST
DEVIATION: INCREASED BASE OF SUPPORT
DISTANCE (FEET): 100
ASSISTIVE DEVICE: FRONT WHEEL WALKER

## 2022-02-08 NOTE — CARE PLAN
The patient is Stable - Low risk of patient condition declining or worsening    Shift Goals  Clinical Goals: PT  Patient Goals: work with PT, DC  Family Goals: CORRINA    Progress made toward(s) clinical / shift goals:  Appropriate assistive device provided when ambulating. Pt up to chair for meals, diet instruction reinforced.     Patient is not progressing towards the following goals:

## 2022-02-08 NOTE — THERAPY
Physical Therapy   Daily Treatment     Patient Name: Geoff Scott  Age:  66 y.o., Sex:  male  Medical Record #: [id number]  Today's Date: 2/8/2022       Precautions: Fall Risk;Swallow Precautions (See Comments)  Comments: hx of R BKA     Assessment    Pt was pleasant and agreeable to therapy session. He was able to increase and improve with all mobility today. He ambulated increased distance with fww and Tati. Due to left inattention he required frequent vc to not run into objects and scan to left to decrease risk of falling. As well vc to increase step height on LLE as he has tendency to drag LLE. He completed transfers with Tati and requiring vc for sequencing and hand placement. He donned prosthesis at EOB but does require assist. Will continue to follow while in house to focus on current impairments     Plan    Continue current treatment plan.    DC Equipment Recommendations: Unable to determine at this time  Discharge Recommendations: Recommend post-acute placement for additional physical therapy services prior to discharge home         02/08/22 0932   Other Treatments   Other Treatments Provided donning prosthetic at EOB extra time and did require Tati    Balance   Sitting Balance (Static) Fair -   Sitting Balance (Dynamic) Fair -   Standing Balance (Static) Fair -   Standing Balance (Dynamic) Fair -   Weight Shift Sitting Fair   Weight Shift Standing Fair   Skilled Intervention Verbal Cuing;Sequencing   Gait Analysis   Gait Level Of Assist Minimal Assist   Assistive Device Front Wheel Walker   Distance (Feet) 100   # of Times Distance was Traveled 1   Deviation Increased Base Of Support   Skilled Intervention Verbal Cuing;Sequencing   Comments with prosthetic Tati for balance, frequent vc for veering to left. As well shuffles LLE, with vc to increase step height    Bed Mobility    Supine to Sit Minimal Assist   Sit to Supine Minimal Assist   Scooting Minimal Assist   Skilled Intervention Verbal  Cuing;Sequencing   Comments Tati, able to get to long sitting and than reach EOB    Functional Mobility   Sit to Stand Minimal Assist   Bed, Chair, Wheelchair Transfer Minimal Assist   Toilet Transfers Minimal Assist   Skilled Intervention Verbal Cuing;Tactile Cuing;Sequencing   Comments vc to keep fww close during all transfers and vc for proper sequencing. Tati for chair and toilet transfers    Short Term Goals    Short Term Goal # 1 Patient will move supine<>sitting EOB without bed features with supervision within 6tx in order to get in/out of bed   Goal Outcome # 1 Progressing as expected   Short Term Goal # 2 Patient will move sitting<>standing with supervision within 6tx in order to initiate transfers and gait   Goal Outcome # 2 Progressing as expected   Short Term Goal # 3 Patient will ambulate 15ft with mod A within 6tx in order to access environment   Goal Outcome # 3 Goal met

## 2022-02-08 NOTE — CARE PLAN
The patient is Stable - Low risk of patient condition declining or worsening    Shift Goals  Clinical Goals: comfort, rest  Patient Goals: sleep  Family Goals: get insurance    Progress made toward(s) clinical / shift goals:  Patient did quite well overnight. No acute events to report. Family called to report that wife has patients insurance information and will be bring it in today.     Patient is not progressing towards the following goals:

## 2022-02-08 NOTE — PROGRESS NOTES
[facility] Medicine Daily Progress Note    Date of Service  2/8/2022    Chief Complaint  Geoff Scott is a 66 y.o. male admitted 1/27/2022 with an acute CVA    Hospital Course  This is a unfortunate 66-year-old male with a history of right below-knee amputation, prior stroke, presented on 1/27, COVID-19 positive, acute onset of left-sided weakness and dysarthria, the patient initially received IV TPA and underwent endovascular clot retrieval, he was noted to have hemorrhagic transformation of the stroke, on 1/29 had acute neuro changes again with right gaze deviation and worsening dysarthria, the patient taken back to the IR suite for additional thrombectomy of the M2 and P1 segment, started on a heparin drip.  Neurology consulting, the patient be continued  Left-sided  weakness, left facial droop and dysarthria.    Interval Problem Update  2/8 in bed, no fever or chills in good spirit, no new complains, pending snf placement.     I have personally seen and examined the patient at bedside. I discussed the plan of care with patient, bedside RN, charge RN and .    Consultants/Specialty  neurology    Code Status  Full Code    Disposition  Patient is medically cleared for discharge.   Anticipate discharge to to skilled nursing facility.  Versus acute rehab  I have placed the appropriate orders for post-discharge needs.    Review of Systems  Review of Systems   Constitutional: Negative for chills, fever and malaise/fatigue.   HENT: Negative for congestion, ear discharge and ear pain.    Eyes: Negative for blurred vision and double vision.   Respiratory: Negative for cough and hemoptysis.    Cardiovascular: Negative for chest pain and palpitations.   Gastrointestinal: Negative for abdominal pain, nausea and vomiting.   Genitourinary: Negative for dysuria, frequency and urgency.   Musculoskeletal: Negative for myalgias.   Neurological: Positive for speech change, focal weakness and weakness. Negative for  dizziness and headaches.   Psychiatric/Behavioral: The patient is not nervous/anxious.         Physical Exam  Temp:  [36.1 °C (97 °F)-36.8 °C (98.2 °F)] 36.7 °C (98.1 °F)  Pulse:  [72-86] 81  Resp:  [16-18] 18  BP: (110-126)/(78-82) 113/79  SpO2:  [92 %-95 %] 93 %    Physical Exam  Vitals and nursing note reviewed.   Constitutional:       Appearance: He is well-developed.   HENT:      Head: Normocephalic.      Comments: Left facial droop  Eyes:      General: No scleral icterus.     Pupils: Pupils are equal, round, and reactive to light.   Cardiovascular:      Rate and Rhythm: Normal rate and regular rhythm.      Heart sounds: Normal heart sounds.   Pulmonary:      Effort: Pulmonary effort is normal. No respiratory distress.   Abdominal:      General: Bowel sounds are normal. There is no distension.      Palpations: Abdomen is soft.      Tenderness: There is no abdominal tenderness.   Genitourinary:     Penis: Normal.       Rectum: Normal.   Musculoskeletal:         General: Normal range of motion.      Cervical back: Normal range of motion.      Comments: Right BKA   Skin:     General: Skin is warm.   Neurological:      Mental Status: He is alert and oriented to person, place, and time.      Motor: Weakness present.      Coordination: Coordination abnormal.         Fluids    Intake/Output Summary (Last 24 hours) at 2/8/2022 1321  Last data filed at 2/8/2022 0030  Gross per 24 hour   Intake --   Output 350 ml   Net -350 ml       Laboratory                        Imaging  DX-CHEST-PORTABLE (1 VIEW)   Final Result         1.  Patchy pulmonary infiltrates, greater on the right. Similar compared to prior study.      CT-CTA HEAD WITH & W/O-POST PROCESS   Final Result         1.  Previously visualized partial occlusion of right M1 segment has resolved status post thrombectomy.   2.  Persistent occlusion of the right distal MCA branch vessel supplying area of infarct, similar to prior study.   3.  Evolving infarct in the  right parietotemporal adnexal lobe region.   4.  Stable hyperdensities in the right sylvian fissure and sulci of the right parietotemporal lobe likely stable hemorrhages.      EC-ECHOCARDIOGRAM LTD W/ CONT   Final Result      IR-THROMBO MECHANICAL ARTERY,INIT   Final Result      1.  Filling defects at the inferior M2 segment and fetal right posterior cerebral artery.   2.  Spontaneous resolution of inferior M2 segment filling defect.   3.  Mechanical thrombectomy of fetal posterior cerebral artery using RED 62. Final angiogram demonstrates patent blood vessels indicating TICI 2b flow.   4.  Successful right internal jugular central venous catheter placement.      IR-APPLE,GROSHONG PLACEMENT >5   Final Result      1.  Filling defects at the inferior M2 segment and fetal right posterior cerebral artery.   2.  Spontaneous resolution of inferior M2 segment filling defect.   3.  Mechanical thrombectomy of fetal posterior cerebral artery using RED 62. Final angiogram demonstrates patent blood vessels indicating TICI 2b flow.   4.  Successful right internal jugular central venous catheter placement.      DX-CHEST-PORTABLE (1 VIEW)   Final Result      1.  Right central line projects over the SVC.   2.  No pneumothorax.   3.  Bilateral airspace opacities compatible with multifocal Covid 19 pneumonia which are not significantly changed.      CT-CEREBRAL PERFUSION ANALYSIS   Final Result      1.  Cerebral blood flow less than 30% likely representing completed infarct = 62 mL.      2.  T Max more than 6 seconds likely representing combination of completed infarct and ischemia = 76 mL.      3.  Mismatched volume likely representing ischemic brain/penumbra = 14 mL      4.  Please note that the cerebral perfusion was performed on the limited brain tissue around the basal ganglia region. Infarct/ischemia outside the CT perfusion sections can be missed in this study.      CT-CTA HEAD WITH & W/O-POST PROCESS   Final Result       Subacute right parieto-occipital infarction without hemorrhagic transformation. The MCA branch vessel supplying this again show contrast cut off      Left thalamic vasogenic edema and subacute hemorrhage exerts mild mass effect, slightly worse than on comparison      New eccentric nonocclusive filling defects in the distal right internal carotid and M1 segments concerning for small thrombus favored over spasm      Similar distal right vertebral artery segmental narrowing may be from spasm or chronic. No focal occlusion and the left vertebral artery is dominant with normal vertebrobasilar confluence      MR-BRAIN-W/O   Final Result      1.  Multifocal areas of acute infarcts in the right frontal, temporal and parietal lobes. . The gradient echo images demonstrates linear area of magnetic susceptibility artifact at the insular cortex likely representing small area hemorrhagic    transformation or thrombus within the vessels.There are petechial microhemorrhages in the right medial posterior temporal lobe.   2.  There is an area of subacute hemorrhage with surrounding white matter edema in the left thalamus. This finding is noted on the previous CT scan dated 1/27/2022.   3.  Mild cerebral volume loss.   4.  Mild chronic microvascular ischemic disease.      CT-HEAD W/O   Final Result      1. Developing hypodensity in the right temporal occipital region consistent with acute infarct.   2. Tiny foci of hemorrhage in the right insular ribbon and possible tiny foci of subarachnoid hemorrhage or contrast staining in the right parietal and temporo-occipital regions.      These findings were messaged via [employer] with JEREMY M GONDA on 1/28/2022 11:58 AM.            US-EXTREMITY VENOUS LOWER BILAT   Final Result      DX-CHEST-PORTABLE (1 VIEW)   Final Result      Patchy airspace process consistent with edema or atelectasis      IR-THROMBO MECHANICAL ARTERY,INIT   Final Result      1.  Occluded inferior M2, superior M2 and some  of the M3 branches.   2.  The fetal right PCA demonstrates thrombus at the middle of the procedure.   3.  Aspiration thrombectomy- inferior M2-RED 62-patent branches.   4.  Aspiration thrombectomy-superior M2-RED 62-patent branches.   5.  Aspiration thrombectomy-fetal right posterior cerebral artery-RED 62-patent branches.   6.  The final TICI score:2b      CT-CTA NECK WITH & W/O-POST PROCESSING   Final Result   Addendum 1 of 1   Addendum:   There is a small nonocclusive arterial thrombus within the right    innominate artery. This is seen on series 9 image 116.      These findings were discussed with REMY CARRERO by Dr. Rock on    1/27/2022.      Final      1.  No hemodynamically significant stenosis of the neck arteries.   2.  Bilateral ill-defined pulmonary opacities in keeping with Covid pneumonia.      CT-CTA HEAD WITH & W/O-POST PROCESS   Final Result      1.  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            Comment: Results discussed with Dr. Carrero at approximately 3:35 PM      CT-CEREBRAL PERFUSION ANALYSIS   Final Result      1.  Nondiagnostic scan secondary to patient motion.      CT-HEAD W/O   Final Result         NO ACUTE ABNORMALITIES ARE NOTED ON CT SCAN OF THE HEAD.                    Assessment/Plan  * Acute ischemic stroke (HCC)- (present on admission)  Assessment & Plan  Atorvastatin 80mg q HS  Now on Eliquis  BP monitoring  Neurochecks closely while on heparin drip and MRI finding of hemorrhage.  Decreased to every 4 hours, transfer to neurology  Neurology consulting.  Aspiration precautions  SLP/PT/OT  COVID recovered on 2/3   Rehab declined.  SNF pending - medically cleared   He has no insurance     Type 2 diabetes mellitus with neurologic complication, without long-term current use of insulin (HCC)- (present on admission)  Assessment & Plan  1/29 A1c:6.6  Monitor accuchecks.  SSI if needed  Diabetic diet.    COVID-19  Assessment & Plan  On RA  COVID  recovered on 2/3        VTE prophylaxis: therapeutic anticoagulation with Eliquis     I have performed a physical exam and reviewed and updated ROS and Plan today (2/8/2022). In review of yesterday's note (2/7/2022), there are no changes except as documented above.

## 2022-02-09 PROCEDURE — 700102 HCHG RX REV CODE 250 W/ 637 OVERRIDE(OP): Performed by: HOSPITALIST

## 2022-02-09 PROCEDURE — 700102 HCHG RX REV CODE 250 W/ 637 OVERRIDE(OP): Performed by: NURSE PRACTITIONER

## 2022-02-09 PROCEDURE — A9270 NON-COVERED ITEM OR SERVICE: HCPCS | Performed by: HOSPITALIST

## 2022-02-09 PROCEDURE — 99231 SBSQ HOSP IP/OBS SF/LOW 25: CPT | Performed by: HOSPITALIST

## 2022-02-09 PROCEDURE — A9270 NON-COVERED ITEM OR SERVICE: HCPCS | Performed by: NURSE PRACTITIONER

## 2022-02-09 PROCEDURE — 92526 ORAL FUNCTION THERAPY: CPT

## 2022-02-09 PROCEDURE — 97535 SELF CARE MNGMENT TRAINING: CPT | Mod: CO

## 2022-02-09 PROCEDURE — 770001 HCHG ROOM/CARE - MED/SURG/GYN PRIV*

## 2022-02-09 RX ADMIN — DOCUSATE SODIUM 50 MG AND SENNOSIDES 8.6 MG 2 TABLET: 8.6; 5 TABLET, FILM COATED ORAL at 04:28

## 2022-02-09 RX ADMIN — APIXABAN 5 MG: 5 TABLET, FILM COATED ORAL at 06:00

## 2022-02-09 RX ADMIN — APIXABAN 5 MG: 5 TABLET, FILM COATED ORAL at 17:21

## 2022-02-09 RX ADMIN — AMLODIPINE BESYLATE 5 MG: 5 TABLET ORAL at 04:25

## 2022-02-09 RX ADMIN — ATORVASTATIN CALCIUM 80 MG: 40 TABLET, FILM COATED ORAL at 17:21

## 2022-02-09 RX ADMIN — MINERAL OIL, PETROLATUM 1 APPLICATION: 425; 573 OINTMENT OPHTHALMIC at 16:38

## 2022-02-09 ASSESSMENT — ENCOUNTER SYMPTOMS
NERVOUS/ANXIOUS: 0
FEVER: 0
VOMITING: 0
HEMOPTYSIS: 0
HEADACHES: 0
MYALGIAS: 0
DIZZINESS: 0
CHILLS: 0
FOCAL WEAKNESS: 1
NAUSEA: 0
COUGH: 0
PALPITATIONS: 0
SPEECH CHANGE: 1
BLURRED VISION: 0
ABDOMINAL PAIN: 0
WEAKNESS: 1

## 2022-02-09 ASSESSMENT — COGNITIVE AND FUNCTIONAL STATUS - GENERAL
EATING MEALS: A LITTLE
DRESSING REGULAR UPPER BODY CLOTHING: A LITTLE
DRESSING REGULAR LOWER BODY CLOTHING: A LITTLE
SUGGESTED CMS G CODE MODIFIER DAILY ACTIVITY: CK
TOILETING: A LITTLE
PERSONAL GROOMING: A LITTLE
DAILY ACTIVITIY SCORE: 18
HELP NEEDED FOR BATHING: A LITTLE

## 2022-02-09 ASSESSMENT — PAIN DESCRIPTION - PAIN TYPE
TYPE: ACUTE PAIN
TYPE: ACUTE PAIN

## 2022-02-09 NOTE — CARE PLAN
The patient is Stable - Low risk of patient condition declining or worsening    Shift Goals  Clinical Goals: comfort, PT  Patient Goals: rest, go for a walk  Family Goals: get insurance    Progress made toward(s) clinical / shift goals:  Pt remains A&O x4. Up to chair during the day and for meals. FWW provided when ambulating. Bed and chair alarm in use.    Patient is not progressing towards the following goals:

## 2022-02-09 NOTE — THERAPY
Speech Language Pathology  Daily Treatment     Patient Name: Geoff Scott  Age:  66 y.o., Sex:  male  Medical Record #: [id number]  Today's Date: 2/9/2022     Precautions: Fall Risk,Swallow Precautions ( See Comments)  Comments: hx of R IRLANDA     Assessment    Patient was seen on this date for dysphagia treatment. Patient seen with a SB6/TN0 meal tray in addition to PO trials of regular solids. Patient was impulsive taking larger bites, taking large consecutive sips, and eating at a rapid rate. Pt required mod fade to min verbal cues to adhere to swallow precautions. Patient had no overt s/sx of aspiration across all trials tested including consecutive sips of thins. Voice remained clear. Mastication was timely with regular solids.    Recommendations  1) Regular and Thin Liquid diet   -assistance with tray set up   -close monitoring  2) SLP following for cognitive-linguistic treatment     Plan    Continue current treatment plan.    Discharge Recommendations: Recommend post-acute placement for additional speech therapy services prior to discharge home     Objective       02/09/22 1139   Vitals   O2 Delivery Device None - Room Air   Dysphagia    Diet / Liquid Recommendation Regular (7);Thin (0)   Recommended Route of Medication Administration   Medication Administration  Whole with Liquid Wash   Short Term Goals   Short Term Goal # 1 Patient will consume regular diet w/ thin liquids w/ assistance as needed w/ no overt s/sx of aspiration.    Goal Outcome # 1 Progressing as expected

## 2022-02-09 NOTE — CARE PLAN
The patient is Stable - Low risk of patient condition declining or worsening    Shift Goals  Clinical Goals: Monitor neuro status, safety  Patient Goals: Sleep  Family Goals: CORRINA    Progress made toward(s) clinical / shift goals:    Problem: Optimal Care of the Stroke Patient  Goal: Optimal emergency care for the stroke patient  Outcome: Progressing   Patient educated on stroke symptoms. Mindful of weak left side when turning and moving.     Problem: Neuro Status  Goal: Neuro status will remain stable or improve  Outcome: Progressing   Neuro status remains stable. Q4 hr neuro checks.     Problem: Psychosocial - Patient Condition  Goal: Patient's ability to verbalize feelings about condition will improve  Outcome: Progressing   The patient has a flat affect and quiet voice but makes humorous remarks and laughs about his course of stay.     Patient is not progressing towards the following goals:

## 2022-02-09 NOTE — DIETARY
Nutrition Services: Update   Day 13 of admit.  Geoff Scott is a 66 y.o. male with admitting DX of Acute ischemic stroke.    Pt is currently on regular diet. PO intake is mainly % meals with a few meals of 0% or 25-50%, does not appear to be drinking Boost. Wt 2/1: 65.5 kg via bed scale - weight down 1.5 kg during 2 week admit, overall stable and pt is -1.6L fluids per I/O. No wounds or edema noted. Labs/meds reviewed. Last BM: 2/8. SLP upgraded diet to regular.     Malnutrition Risk: No new risk identified.    Recommendations/Plan:  1. Diet per SLP, continue Boost if pt wants.  2. Encourage intake of meals  3. Document intake of all meals as % taken in ADL's to provide interdisciplinary communication across all shifts.   4. Monitor weight.  5. Nutrition rep will continue to see patient for ongoing meal and snack preferences.    RD following

## 2022-02-09 NOTE — THERAPY
Occupational Therapy  Daily Treatment     Patient Name: Geoff Scott  Age:  66 y.o., Sex:  male  Medical Record #: [id number]  Today's Date: 2/9/2022       Precautions: Fall Risk,Swallow Precautions ( See Comments)  Comments: hx of R BKA    Assessment    Pt seen for OT tx. Continues to be limited by decreased activity tolerance, balance deficits, inattention and L neglect impacting ability to complete ADLs and ADL transfers independently. Continued assistance for donning/doffing prosthetic. Continues to have L neglect impacting safety w/ FWW. Will continue to benefit from OT services while in house.     Plan    Continue current treatment plan.    DC Equipment Recommendations: Unable to determine at this time  Discharge Recommendations: Recommend post-acute placement for additional occupational therapy services prior to discharge home. However should placement not be an option recommend home w/ family support, should they be able to provide supervision and assistance at current level.        02/09/22 1315   Cognition    Cognition / Consciousness X   Safety Awareness Impaired   Attention Impaired   Active ROM Upper Body   Active ROM Upper Body  X   Comments LUE ROM limited d/t weakness, weak L hand strength    Strength Upper Body   Upper Body Strength  X   Comments L hand grossly weak, RUE WFL   Balance   Sitting Balance (Static) Fair -   Sitting Balance (Dynamic) Fair -   Standing Balance (Static) Fair -   Standing Balance (Dynamic) Fair -   Weight Shift Sitting Fair   Weight Shift Standing Fair   Bed Mobility    Supine to Sit Minimal Assist   Sit to Supine Minimal Assist   Scooting Minimal Assist   Activities of Daily Living   Grooming Minimal Assist;Standing   Upper Body Dressing Minimal Assist   Lower Body Dressing Moderate Assist   Toileting Minimal Assist   Functional Mobility   Sit to Stand Minimal Assist   Bed, Chair, Wheelchair Transfer Minimal Assist   Toilet Transfers Minimal Assist   Short Term Goals    Short Term Goal # 1 attend to L side w/min v/cs during ADL/txfs   Goal Outcome # 1 Progressing as expected   Short Term Goal # 2 LB dressing with min A   Goal Outcome # 2 Progressing as expected   Short Term Goal # 3 BSC txf with min A   Goal Outcome # 3 Progressing as expected   Short Term Goal # 4 use of LUE w/o v/cs during BUE seated g/h req min A   Goal Outcome # 4 Progressing as expected   Anticipated Discharge Equipment and Recommendations   DC Equipment Recommendations Unable to determine at this time   Discharge Recommendations Recommend post-acute placement for additional occupational therapy services prior to discharge home

## 2022-02-09 NOTE — DISCHARGE PLANNING
Anticipated Discharge Disposition:   Home    Action:   Discussed during IDT rounds. MD is planning to dc pt tomorrow. Met with pt and he is able to sit on a chair, alert and oriented x4. He said that his grandson Baljit can help him and daughter David is about to move in with them to help. FWW ordered through [employer].     Met with granddequan Smiley. He reassured this CM and CN Shane that he will assist pt at home and CN Shane will get PT/OT/ST instructions for Baljit to follow at home. Baljit also confirmed that his mom David is moving in with pt to help him.     Barriers to Discharge:   None-family will be here to  early tomorrow.     Plan:   Dc tomorrow.

## 2022-02-09 NOTE — PROGRESS NOTES
Hospital Medicine Daily Progress Note    Date of Service  2/9/2022    Chief Complaint  Geoff Scott is a 66 y.o. male admitted 1/27/2022 with an acute CVA    Hospital Course  This is a unfortunate 66-year-old male with a history of right below-knee amputation, prior stroke, presented on 1/27, COVID-19 positive, acute onset of left-sided weakness and dysarthria, the patient initially received IV TPA and underwent endovascular clot retrieval, he was noted to have hemorrhagic transformation of the stroke, on 1/29 had acute neuro changes again with right gaze deviation and worsening dysarthria, the patient taken back to the IR suite for additional thrombectomy of the M2 and P1 segment, started on a heparin drip.  Neurology consulting, the patient be continued  Left-sided  weakness, left facial droop and dysarthria.    Interval Problem Update  2/8 in bed, no fever or chills in good spirit, no new complains, pending snf placement.  2/9 in chair, he would like to go home, he understands that he is waiting for insurance approval, patient stated that he has good support at home in [place] with his family and friends and he would like to dc to home with family, discussed with CM who will confirm home support and probably dc home in am.      I have personally seen and examined the patient at bedside. I discussed the plan of care with patient, bedside RN, charge RN and .    Consultants/Specialty  neurology    Code Status  Full Code    Disposition  Patient is medically cleared for discharge.   Anticipate discharge to to home with close outpatient follow-up.    I have placed the appropriate orders for post-discharge needs.    Review of Systems  Review of Systems   Constitutional: Negative for chills and fever.   HENT: Negative for congestion, ear discharge and ear pain.    Eyes: Negative for blurred vision.   Respiratory: Negative for cough and hemoptysis.    Cardiovascular: Negative for chest pain and  palpitations.   Gastrointestinal: Negative for abdominal pain, nausea and vomiting.   Genitourinary: Negative for dysuria and urgency.   Musculoskeletal: Negative for myalgias.   Neurological: Positive for speech change, focal weakness and weakness (improved). Negative for dizziness and headaches.   Psychiatric/Behavioral: The patient is not nervous/anxious.         Physical Exam  Temp:  [36.2 °C (97.2 °F)-36.3 °C (97.4 °F)] 36.2 °C (97.2 °F)  Pulse:  [87-98] 95  Resp:  [18] 18  BP: (106-119)/(71-81) 110/78  SpO2:  [97 %] 97 %    Physical Exam  Vitals and nursing note reviewed.   Constitutional:       Appearance: He is well-developed.   HENT:      Head: Normocephalic.      Comments: Left facial droop  Eyes:      General: No scleral icterus.     Pupils: Pupils are equal, round, and reactive to light.   Cardiovascular:      Rate and Rhythm: Normal rate and regular rhythm.      Heart sounds: Normal heart sounds.   Pulmonary:      Effort: Pulmonary effort is normal. No respiratory distress.   Abdominal:      General: Bowel sounds are normal. There is no distension.      Palpations: Abdomen is soft.      Tenderness: There is no guarding.   Genitourinary:     Penis: Normal.       Rectum: Normal.   Musculoskeletal:         General: Normal range of motion.      Cervical back: Normal range of motion.      Comments: Right BKA   Skin:     General: Skin is warm.   Neurological:      Mental Status: He is alert and oriented to person, place, and time.      Motor: Weakness present.      Coordination: Coordination abnormal.         Fluids    Intake/Output Summary (Last 24 hours) at 2/9/2022 1530  Last data filed at 2/9/2022 0900  Gross per 24 hour   Intake 200 ml   Output 100 ml   Net 100 ml       Laboratory                        Imaging  DX-CHEST-PORTABLE (1 VIEW)   Final Result         1.  Patchy pulmonary infiltrates, greater on the right. Similar compared to prior study.      CT-CTA HEAD WITH & W/O-POST PROCESS   Final Result          1.  Previously visualized partial occlusion of right M1 segment has resolved status post thrombectomy.   2.  Persistent occlusion of the right distal MCA branch vessel supplying area of infarct, similar to prior study.   3.  Evolving infarct in the right parietotemporal adnexal lobe region.   4.  Stable hyperdensities in the right sylvian fissure and sulci of the right parietotemporal lobe likely stable hemorrhages.      EC-ECHOCARDIOGRAM LTD W/ CONT   Final Result      IR-THROMBO MECHANICAL ARTERY,INIT   Final Result      1.  Filling defects at the inferior M2 segment and fetal right posterior cerebral artery.   2.  Spontaneous resolution of inferior M2 segment filling defect.   3.  Mechanical thrombectomy of fetal posterior cerebral artery using RED 62. Final angiogram demonstrates patent blood vessels indicating TICI 2b flow.   4.  Successful right internal jugular central venous catheter placement.      IR-APPLESERAFIN CALIX PLACEMENT >5   Final Result      1.  Filling defects at the inferior M2 segment and fetal right posterior cerebral artery.   2.  Spontaneous resolution of inferior M2 segment filling defect.   3.  Mechanical thrombectomy of fetal posterior cerebral artery using RED 62. Final angiogram demonstrates patent blood vessels indicating TICI 2b flow.   4.  Successful right internal jugular central venous catheter placement.      DX-CHEST-PORTABLE (1 VIEW)   Final Result      1.  Right central line projects over the SVC.   2.  No pneumothorax.   3.  Bilateral airspace opacities compatible with multifocal Covid 19 pneumonia which are not significantly changed.      CT-CEREBRAL PERFUSION ANALYSIS   Final Result      1.  Cerebral blood flow less than 30% likely representing completed infarct = 62 mL.      2.  T Max more than 6 seconds likely representing combination of completed infarct and ischemia = 76 mL.      3.  Mismatched volume likely representing ischemic brain/penumbra = 14 mL      4.   Please note that the cerebral perfusion was performed on the limited brain tissue around the basal ganglia region. Infarct/ischemia outside the CT perfusion sections can be missed in this study.      CT-CTA HEAD WITH & W/O-POST PROCESS   Final Result      Subacute right parieto-occipital infarction without hemorrhagic transformation. The MCA branch vessel supplying this again show contrast cut off      Left thalamic vasogenic edema and subacute hemorrhage exerts mild mass effect, slightly worse than on comparison      New eccentric nonocclusive filling defects in the distal right internal carotid and M1 segments concerning for small thrombus favored over spasm      Similar distal right vertebral artery segmental narrowing may be from spasm or chronic. No focal occlusion and the left vertebral artery is dominant with normal vertebrobasilar confluence      MR-BRAIN-W/O   Final Result      1.  Multifocal areas of acute infarcts in the right frontal, temporal and parietal lobes. . The gradient echo images demonstrates linear area of magnetic susceptibility artifact at the insular cortex likely representing small area hemorrhagic    transformation or thrombus within the vessels.There are petechial microhemorrhages in the right medial posterior temporal lobe.   2.  There is an area of subacute hemorrhage with surrounding white matter edema in the left thalamus. This finding is noted on the previous CT scan dated 1/27/2022.   3.  Mild cerebral volume loss.   4.  Mild chronic microvascular ischemic disease.      CT-HEAD W/O   Final Result      1. Developing hypodensity in the right temporal occipital region consistent with acute infarct.   2. Tiny foci of hemorrhage in the right insular ribbon and possible tiny foci of subarachnoid hemorrhage or contrast staining in the right parietal and temporo-occipital regions.      These findings were messaged via Vo[employer] with JEREMY M GONDA on 1/28/2022 11:58 AM.             US-EXTREMITY VENOUS LOWER BILAT   Final Result      DX-CHEST-PORTABLE (1 VIEW)   Final Result      Patchy airspace process consistent with edema or atelectasis      IR-THROMBO MECHANICAL ARTERY,INIT   Final Result      1.  Occluded inferior M2, superior M2 and some of the M3 branches.   2.  The fetal right PCA demonstrates thrombus at the middle of the procedure.   3.  Aspiration thrombectomy- inferior M2-RED 62-patent branches.   4.  Aspiration thrombectomy-superior M2-RED 62-patent branches.   5.  Aspiration thrombectomy-fetal right posterior cerebral artery-RED 62-patent branches.   6.  The final TICI score:2b      CT-CTA NECK WITH & W/O-POST PROCESSING   Final Result   Addendum 1 of 1   Addendum:   There is a small nonocclusive arterial thrombus within the right    innominate artery. This is seen on series 9 image 116.      These findings were discussed with REMY CARRERO by Dr. Rock on    1/27/2022.      Final      1.  No hemodynamically significant stenosis of the neck arteries.   2.  Bilateral ill-defined pulmonary opacities in keeping with Covid pneumonia.      CT-CTA HEAD WITH & W/O-POST PROCESS   Final Result      1.  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            Comment: Results discussed with Dr. Carrero at approximately 3:35 PM      CT-CEREBRAL PERFUSION ANALYSIS   Final Result      1.  Nondiagnostic scan secondary to patient motion.      CT-HEAD W/O   Final Result         NO ACUTE ABNORMALITIES ARE NOTED ON CT SCAN OF THE HEAD.                    Assessment/Plan  * Acute ischemic stroke (HCC)- (present on admission)  Assessment & Plan  Atorvastatin 80mg q HS  Now on Eliquis  BP monitoring  Neurochecks closely while on heparin drip and MRI finding of hemorrhage.  Decreased to every 4 hours, transfer to neurology  Neurology consulting.  Aspiration precautions  SLP/PT/OT  COVID recovered on 2/3   Rehab declined.  SNF pending - medically cleared   He has no  insurance     Type 2 diabetes mellitus with neurologic complication, without long-term current use of insulin (HCC)- (present on admission)  Assessment & Plan  1/29 A1c:6.6  Monitor accuchecks.  SSI if needed  Diabetic diet.    COVID-19  Assessment & Plan  On RA  COVID recovered on 2/3        VTE prophylaxis: therapeutic anticoagulation with Eliquis     I have performed a physical exam and reviewed and updated ROS and Plan today (2/9/2022). In review of yesterday's note (2/8/2022), there are no changes except as documented above.

## 2022-02-10 VITALS
HEART RATE: 90 BPM | WEIGHT: 144.45 LBS | TEMPERATURE: 97.3 F | HEIGHT: 66 IN | RESPIRATION RATE: 17 BRPM | DIASTOLIC BLOOD PRESSURE: 72 MMHG | OXYGEN SATURATION: 93 % | BODY MASS INDEX: 23.21 KG/M2 | SYSTOLIC BLOOD PRESSURE: 115 MMHG

## 2022-02-10 PROBLEM — U07.1 COVID-19: Status: RESOLVED | Noted: 2022-01-27 | Resolved: 2022-02-10

## 2022-02-10 PROBLEM — I63.9 ACUTE ISCHEMIC STROKE (HCC): Status: RESOLVED | Noted: 2022-01-27 | Resolved: 2022-02-10

## 2022-02-10 PROCEDURE — 700102 HCHG RX REV CODE 250 W/ 637 OVERRIDE(OP): Performed by: HOSPITALIST

## 2022-02-10 PROCEDURE — A9270 NON-COVERED ITEM OR SERVICE: HCPCS | Performed by: NURSE PRACTITIONER

## 2022-02-10 PROCEDURE — 99239 HOSP IP/OBS DSCHRG MGMT >30: CPT | Performed by: HOSPITALIST

## 2022-02-10 PROCEDURE — 97535 SELF CARE MNGMENT TRAINING: CPT | Mod: CQ

## 2022-02-10 PROCEDURE — 700102 HCHG RX REV CODE 250 W/ 637 OVERRIDE(OP): Performed by: NURSE PRACTITIONER

## 2022-02-10 PROCEDURE — A9270 NON-COVERED ITEM OR SERVICE: HCPCS | Performed by: HOSPITALIST

## 2022-02-10 PROCEDURE — 97535 SELF CARE MNGMENT TRAINING: CPT | Mod: CO

## 2022-02-10 RX ORDER — ATORVASTATIN CALCIUM 80 MG/1
80 TABLET, FILM COATED ORAL EVERY EVENING
Qty: 30 TABLET | Refills: 0 | Status: SHIPPED | OUTPATIENT
Start: 2022-02-10

## 2022-02-10 RX ORDER — AMLODIPINE BESYLATE 5 MG/1
5 TABLET ORAL DAILY
Qty: 30 TABLET | Refills: 0 | Status: ON HOLD | OUTPATIENT
Start: 2022-02-11 | End: 2023-11-29

## 2022-02-10 RX ADMIN — APIXABAN 5 MG: 5 TABLET, FILM COATED ORAL at 04:20

## 2022-02-10 RX ADMIN — AMLODIPINE BESYLATE 5 MG: 5 TABLET ORAL at 04:21

## 2022-02-10 ASSESSMENT — COGNITIVE AND FUNCTIONAL STATUS - GENERAL
TOILETING: A LITTLE
HELP NEEDED FOR BATHING: A LITTLE
SUGGESTED CMS G CODE MODIFIER MOBILITY: CK
WALKING IN HOSPITAL ROOM: A LITTLE
DAILY ACTIVITIY SCORE: 18
EATING MEALS: A LITTLE
PERSONAL GROOMING: A LITTLE
MOVING FROM LYING ON BACK TO SITTING ON SIDE OF FLAT BED: A LITTLE
DRESSING REGULAR LOWER BODY CLOTHING: A LITTLE
TURNING FROM BACK TO SIDE WHILE IN FLAT BAD: A LITTLE
MOVING TO AND FROM BED TO CHAIR: A LITTLE
MOBILITY SCORE: 18
SUGGESTED CMS G CODE MODIFIER DAILY ACTIVITY: CK
CLIMB 3 TO 5 STEPS WITH RAILING: A LITTLE
DRESSING REGULAR UPPER BODY CLOTHING: A LITTLE
STANDING UP FROM CHAIR USING ARMS: A LITTLE

## 2022-02-10 ASSESSMENT — PAIN DESCRIPTION - PAIN TYPE: TYPE: ACUTE PAIN

## 2022-02-10 ASSESSMENT — GAIT ASSESSMENTS
ASSISTIVE DEVICE: FRONT WHEEL WALKER
GAIT LEVEL OF ASSIST: CONTACT GUARD ASSIST

## 2022-02-10 NOTE — CARE PLAN
The patient is Stable - Low risk of patient condition declining or worsening    Shift Goals  Clinical Goals: Monitor neuro status  Patient Goals: Go home  Family Goals: CORRINA    Progress made toward(s) clinical / shift goals:    Problem: Knowledge Deficit - Stroke Education  Goal: Patient's knowledge of stroke and risk factors will improve  Outcome: Progressing   Patient has been educated on plan of care, including his expected discharge for the morning at 0930.    Problem: Neuro Status  Goal: Neuro status will remain stable or improve  Outcome: Progressing   Neuro status remains stable. Q4 hr neuro checks.    Problem: Fall Risk  Goal: Patient will remain free from falls  Outcome: Progressing   Fall precautions in place. Patient calls appropriately for bathroom needs.    Patient is not progressing towards the following goals:

## 2022-02-10 NOTE — CARE PLAN
The patient is Stable - Low risk of patient condition declining or worsening    Shift Goals  Clinical Goals: Monitor neuro status  Patient Goals: Go home  Family Goals: CORRINA    Progress made toward(s) clinical / shift goals:    Problem: Knowledge Deficit - Stroke Education  Goal: Patient's knowledge of stroke and risk factors will improve  Outcome: Progressing  Note: Pt verbalizes understanding of plan of care.      Problem: Neuro Status  Goal: Neuro status will remain stable or improve  Outcome: Progressing  Note: Pt's neuro status has remained stable.      Problem: Mobility - Stroke  Goal: Patient's capacity to carry out activities will improve  Outcome: Progressing  Note: Pt able to ambulate around unit with FWW and assistance of one.

## 2022-02-10 NOTE — THERAPY
Physical Therapy   Daily Treatment     Patient Name: Geoff Scott  Age:  66 y.o., Sex:  male  Medical Record #: [id number]  Today's Date: 2/10/2022       Precautions: Fall Risk;Swallow Precautions ( See Comments)  Comments: hx of BKA     Assessment    Pt was pleasant, family member present for caregiver family training to dc home. Educated on safety with gait training and transfers. As well provided handout for exercises for continued strengthening. As well discussed energy conservation, safety with fww in home environment and overall balance and transfer safety. Pt and family member very pleasant and receptive to all education at this time.     Plan    Continue current treatment plan.    DC Equipment Recommendations: front wheeled walker  Discharge Recommendations: Recommend post-acute placement for additional physical therapy services prior to discharge home. However if unable to than will recommend dc home with family support.          02/10/22 1418   Other Treatments   Other Treatments Provided Present for family training. Provided grandson with hand out for exercises. Discussed progression and form. As well discussed balance with gait training. Educated on providing vc due to left veering. As well educated on proper sequencing and safety with transfers. Pt family receptive and reports no concerns regarding progression or safety at home   Balance   Standing Balance (Static) Fair -   Standing Balance (Dynamic) Fair -   Weight Shift Sitting Fair   Weight Shift Standing Fair   Skilled Intervention Verbal Cuing;Sequencing   Gait Analysis   Gait Level Of Assist Contact Guard Assist   Assistive Device Front Wheel Walker   Skilled Intervention Verbal Cuing;Sequencing   Comments with prosthetic, family reporting has walked entire lap with patient and aware of his balacne    Bed Mobility    Comments up in chair upon arrival    Functional Mobility   Sit to Stand Contact Guard Assist   Bed, Chair, Wheelchair Transfer  Minimal Assist   Skilled Intervention Verbal Cuing;Sequencing   Comments vc for sequencing and technique with fww    Short Term Goals    Short Term Goal # 1 Patient will move supine<>sitting EOB without bed features with supervision within 6tx in order to get in/out of bed   Goal Outcome # 1 Progressing as expected   Short Term Goal # 2 Patient will move sitting<>standing with supervision within 6tx in order to initiate transfers and gait   Goal Outcome # 2 Progressing as expected   Short Term Goal # 3 Patient will ambulate 15ft with mod A within 6tx in order to access environment   Goal Outcome # 3 Progressing as expected   Short Term Goal # 3 B Pt will ambulate > 200 ft with LRAD and SPV to access community needs in 6 visits   Goal Outcome # 3 B Progressing as expected

## 2022-02-10 NOTE — DISCHARGE SUMMARY
Discharge Summary    CHIEF COMPLAINT ON ADMISSION  Chief Complaint   Patient presents with   • Possible Stroke     Onset at 1400, R sided gaze deviation & L sided facial droop, L sided arm & leg drift. BG 98 ons scene with REMSA. IV placed en route, no thinners.       Reason for Admission  EMS     Admission Date  1/27/2022    CODE STATUS  Full code    HPI & HOSPITAL COURSE  This is a unfortunate 66-year-old male with a history of right below-knee amputation, prior stroke, presented on 1/27, COVID-19 positive, acute onset of left-sided weakness and dysarthria, the patient initially received IV TPA and underwent endovascular clot retrieval, he was noted to have hemorrhagic transformation of the stroke, on 1/29 had acute neuro changes again with right gaze deviation and worsening dysarthria, the patient taken back to the IR suite for additional thrombectomy of the M2 and P1 segment, started on a heparin drip.  Neurology consulting, the patient be continued  Left-sided  weakness, left facial droop and dysarthria.  Neurology recommended to transition to eliquis   Patient recovered from covid 19 on 2/3/22  Patient evaluated by pt/ot/st and recommended SNF, unfortunately patient was not accepted in any SNF, patient gradually has improved and he would like to go home and do outpatient pt/ot, patient has good support at home and family/friends are willing to help, patient is hemodynamically stable, plan of care has been discussed with patient, all questions answered. Therapy met with patient and family today for recommendations regarding care at home.       Therefore, he is discharged in good and stable condition to home with close outpatient follow-up.    The patient met 2-midnight criteria for an inpatient stay at the time of discharge.    Discharge Date  2/10/2022    FOLLOW UP ITEMS POST DISCHARGE  Neurology  Primary care.    DISCHARGE DIAGNOSES  Principal Problem (Resolved):    Acute ischemic stroke (HCC) POA:  Yes  Active Problems:    Type 2 diabetes mellitus with neurologic complication, without long-term current use of insulin (HCC) POA: Yes  Resolved Problems:    COVID-19 POA: Unknown      FOLLOW UP  Future Appointments   Date Time Provider Department Center   2/24/2022 10:00 AM [facility] None     PCP    In 1 week  Follow up with PCP      MEDICATIONS ON DISCHARGE     Medication List      START taking these medications      Instructions   amLODIPine 5 MG Tabs  Start taking on: February 11, 2022  Commonly known as: NORVASC   Take 1 Tablet by mouth every day.  Dose: 5 mg     apixaban 5mg Tabs  Commonly known as: ELIQUIS   Take 1 Tablet by mouth 2 times a day. Indications: Thromboembolism secondary to Atrial Fibrillation  Dose: 5 mg     atorvastatin 80 MG tablet  Commonly known as: LIPITOR   Take 1 Tablet by mouth every evening.  Dose: 80 mg        CONTINUE taking these medications      Instructions   acetaminophen 325 MG Tabs  Commonly known as: Tylenol   Take 650 mg by mouth every four hours as needed (Mild Pain or Headache). 2 tablets = 650 mg.  Dose: 650 mg            Allergies  Allergies   Allergen Reactions   • Banana Hives and Itching   • Grape, Artificial Hives and Itching     ALLERGIC TO GRAPES.       DIET  Healthy diet    ACTIVITY  As tolerated.  Weight bearing as tolerated    CONSULTATIONS  Neuro  IR    PROCEDURES  Mechanical thrombectomy     LABORATORY  Lab Results   Component Value Date    SODIUM 136 02/02/2022    POTASSIUM 3.9 02/02/2022    CHLORIDE 103 02/02/2022    CO2 23 02/02/2022    GLUCOSE 94 02/02/2022    BUN 13 02/02/2022    CREATININE 0.82 02/02/2022        Lab Results   Component Value Date    WBC 11.0 (H) 02/02/2022    HEMOGLOBIN 15.0 02/02/2022    HEMATOCRIT 43.0 02/02/2022    PLATELETCT 284 02/02/2022        Total time of the discharge process exceeds 35 minutes.

## 2022-02-10 NOTE — DISCHARGE INSTRUCTIONS
Discharge Instructions per Rocco Mares M.D.    Follow up with primary care doctor in 1 week   follow up with neurology     DIET: healthy diet    ACTIVITY: as tolerated    DIAGNOSIS: stroke    Return to ER if symptoms return, fever, chills, chest pain, shortness of breath.                     Discharge Instructions    Discharged to home by car with relative. Discharged via wheelchair, hospital escort: Yes.  Special equipment needed: Walker    Be sure to schedule a follow-up appointment with your primary care doctor or any specialists as instructed.     Discharge Plan:   Diet Plan: Discussed  Activity Level: Discussed  Confirmed Follow up Appointment: Patient to Call and Schedule Appointment  Confirmed Symptoms Management: Discussed  Medication Reconciliation Updated: Yes    I understand that a diet low in cholesterol, fat, and sodium is recommended for good health. Unless I have been given specific instructions below for another diet, I accept this instruction as my diet prescription.   Other diet: Regular    Special Instructions:     Stroke/CVA/TIA/Hemorrhagic Ischemia Discharge Instructions  You have had a stroke. Your risk factors have been identified as follows:  Age - Over 55  Gender - Men are at a higher risk than women  Diabetes  High Cholesterol and lipids  It is important that you reduce your risk factors to avoid another stroke in the future. Here are some general guidelines to follow:  · Eat healthy - avoid food high in fat.  · Get regular exercise.  · Maintain a healthy weight.  · Avoid smoking.  · Avoid alcohol and illegal drug use.  · Take your medications as directed.  For more information regarding risk factors, refer to pages 17-19 in your Stroke Patient Education Guide. Stroke Education Guide was given to patient.    Warning signs of a stroke include (which can also be found on page 3 of your Stroke Patient Education Guide):  · Sudden numbness of weakness of the face, arm or leg  (especially on one side of the body).  · Sudden confusion, trouble speaking or understanding.  · Sudden trouble seeing in one or both eyes.  · Sudden trouble walking, dizziness, loss of balance or coordination.  · Sudden severe headache with no known cause.  It is very important to get treatment quickly when a stroke occurs. If you experience any of the above warning signs, call [phone] immediately.     Some patients who have had a stroke will be going home on a blood thinner medication called Warfarin (Coumadin).  This medication requires very close monitoring and follow up.  This follow up can be provided by either your Primary Care Physician or by [facility]tico[facility].  The Outpatient Anticoagulation Service is located at the [facility] at [facility] ([facility]).  If you do not know when your follow up appointment is scheduled, call [phone] to verify your appointment time.      · Is patient discharged on Warfarin / Coumadin?   No     Depression / Suicide Risk    As you are discharged from this [facility], it is important to learn how to keep safe from harming yourself.    Recognize the warning signs:  · Abrupt changes in personality, positive or negative- including increase in energy   · Giving away possessions  · Change in eating patterns- significant weight changes-  positive or negative  · Change in sleeping patterns- unable to sleep or sleeping all the time   · Unwillingness or inability to communicate  · Depression  · Unusual sadness, discouragement and loneliness  · Talk of wanting to die  · Neglect of personal appearance   · Rebelliousness- reckless behavior  · Withdrawal from people/activities they love  · Confusion- inability to concentrate     If you or a loved one observes any of these behaviors or has concerns about self-harm, here's what you can do:  · Talk about it- your feelings and reasons for harming  yourself  · Remove any means that you might use to hurt yourself (examples: pills, rope, extension cords, firearm)  · Get professional help from the community (Mental Health, Substance Abuse, psychological counseling)  · Do not be alone:Call your Safe Contact- someone whom you trust who will be there for you.  · Call your local CRISIS HOTLINE 009-9147 or 428-898-3031  · Call your local Children's Mobile Crisis Response Team [place] (795) 857-8271 or wwwViddsee  · Call the toll free National Suicide Prevention Hotlines   · National Suicide Prevention Lifeline 757-261-WWDG (6839)  · National Hope Line Network 800-SUICIDE (541-6088)      Stroke Prevention  Some medical conditions and lifestyle choices can lead to a higher risk for a stroke. You can help to prevent a stroke by making nutrition, lifestyle, and other changes.  What nutrition changes can be made?    · Eat healthy foods.  ? Choose foods that are high in fiber. These include:  § Fresh fruits.  § Fresh vegetables.  § Whole grains.  ? Eat at least 5 or more servings of fruits and vegetables each day. Try to fill half of your plate at each meal with fruits and vegetables.  ? Choose lean protein foods. These include:  § Lowfat (lean) cuts of meat.  § Chicken without skin.  § Fish.  § Tofu.  § Beans.  § Nuts.  ? Eat low-fat dairy products.  ? Avoid foods that:  § Are high in salt (sodium).  § Have saturated fat.  § Have trans fat.  § Have cholesterol.  § Are processed.  § Are premade.  · Follow eating guidelines as told by your doctor. These may include:  ? Reducing how many calories you eat and drink each day.  ? Limiting how much salt you eat or drink each day to 1,500 milligrams (mg).  ? Using only healthy fats for cooking. These include:  § Olive oil.  § Canola oil.  § Sunflower oil.  ? Counting how many carbohydrates you eat and drink each day.  What lifestyle changes can be made?  · Try to stay at a healthy weight. Talk to your doctor about  what a good weight is for you.  · Get at least 30 minutes of moderate physical activity at least 5 days a week. This can include:  ? Fast walking.  ? Biking.  ? Swimming.  · Do not use any products that have nicotine or tobacco. This includes cigarettes and e-cigarettes. If you need help quitting, ask your doctor. Avoid being around tobacco smoke in general.  · Limit how much alcohol you drink to no more than 1 drink a day for nonpregnant women and 2 drinks a day for men. One drink equals 12 oz of beer, 5 oz of wine, or 1½ oz of hard liquor.  · Do not use drugs.  · Avoid taking birth control pills. Talk to your doctor about the risks of taking birth control pills if:  ? You are over 35 years old.  ? You smoke.  ? You get migraines.  ? You have had a blood clot.  What other changes can be made?  · Manage your cholesterol.  ? It is important to eat a healthy diet.  ? If your cholesterol cannot be managed through your diet, you may also need to take medicines. Take medicines as told by your doctor.  · Manage your diabetes.  ? It is important to eat a healthy diet and to exercise regularly.  ? If your blood sugar cannot be managed through diet and exercise, you may need to take medicines. Take medicines as told by your doctor.  · Control your high blood pressure (hypertension).  ? Try to keep your blood pressure below 130/80. This can help lower your risk of stroke.  ? It is important to eat a healthy diet and to exercise regularly.  ? If your blood pressure cannot be managed through diet and exercise, you may need to take medicines. Take medicines as told by your doctor.  ? Ask your doctor if you should check your blood pressure at home.  ? Have your blood pressure checked every year. Do this even if your blood pressure is normal.  · Talk to your doctor about getting checked for a sleep disorder. Signs of this can include:  ? Snoring a lot.  ? Feeling very tired.  · Take over-the-counter and prescription medicines only  "as told by your doctor. These may include aspirin or blood thinners (antiplatelets or anticoagulants).  · Make sure that any other medical conditions you have are managed.  Where to find more information  · American Stroke Association: www.strokeassociation.org  · National Stroke Association: www.stroke.org  Get help right away if:  · You have any symptoms of stroke. \"BE FAST\" is an easy way to remember the main warning signs:  ? B - Balance. Signs are dizziness, sudden trouble walking, or loss of balance.  ? E - Eyes. Signs are trouble seeing or a sudden change in how you see.  ? F - Face. Signs are sudden weakness or loss of feeling of the face, or the face or eyelid drooping on one side.  ? A - Arms. Signs are weakness or loss of feeling in an arm. This happens suddenly and usually on one side of the body.  ? S - Speech. Signs are sudden trouble speaking, slurred speech, or trouble understanding what people say.  ? T - Time. Time to call emergency services. Write down what time symptoms started.  · You have other signs of stroke, such as:  ? A sudden, very bad headache with no known cause.  ? Feeling sick to your stomach (nausea).  ? Throwing up (vomiting).  ? Jerky movements you cannot control (seizure).  These symptoms may represent a serious problem that is an emergency. Do not wait to see if the symptoms will go away. Get medical help right away. Call your local emergency services (911 in the [place] Do not drive yourself to the hospital.  Summary  · You can prevent a stroke by eating healthy, exercising, not smoking, drinking less alcohol, and treating other health problems, such as diabetes, high blood pressure, or high cholesterol.  · Do not use any products that contain nicotine or tobacco, such as cigarettes and e-cigarettes.  · Get help right away if you have any signs or symptoms of a stroke.  This information is not intended to replace advice given to you by your health care provider. Make sure you " discuss any questions you have with your health care provider.  Document Released: 06/18/2013 Document Revised: 02/13/2020 Document Reviewed: 03/21/2018  Elsevier Patient Education © 2020 Elsevier Inc.

## 2022-02-14 ENCOUNTER — ANTICOAGULATION VISIT (OUTPATIENT)
Dept: VASCULAR LAB | Facility: MEDICAL CENTER | Age: 67
End: 2022-02-14
Attending: INTERNAL MEDICINE
Payer: MEDICARE

## 2022-02-14 ENCOUNTER — DOCUMENTATION (OUTPATIENT)
Dept: VASCULAR LAB | Facility: MEDICAL CENTER | Age: 67
End: 2022-02-14

## 2022-02-14 VITALS — HEART RATE: 74 BPM | DIASTOLIC BLOOD PRESSURE: 87 MMHG | SYSTOLIC BLOOD PRESSURE: 118 MMHG

## 2022-02-14 DIAGNOSIS — I63.9 CEREBROVASCULAR ACCIDENT (CVA), UNSPECIFIED MECHANISM (HCC): ICD-10-CM

## 2022-02-14 PROCEDURE — 99213 OFFICE O/P EST LOW 20 MIN: CPT | Performed by: NURSE PRACTITIONER

## 2022-02-14 NOTE — PATIENT INSTRUCTIONS
- continue taking Eliquis 5 mg by mouth twice daily  - go to the ER for any signs of a stroke or for shortness of breath, chest pain, pain with deep inhalation, new onset leg swelling and/or pain in calf or leg   - let all your providers know you take this medication  - don't stop this medication without permission from your doctor or our clinic  -  refills before you run out  - continue complete avoidance of tobacco products  - avoid hormonal therapies including estrogen or testosterone-containing meds, or raloxifene or tamoxifene (commonly used for osteoporosis)  - to avoid Aspirin and anti-inflammatories (eg. Advil, ibuprofen, Aleve, naproxen, etc) while anticoagulated   - Avoid skiing or other dangerous activities to reduce risk of head injury and brain bleeds  - elevate legs as much as possible, use compression stockings/socks if directed by your provider  - if any bleeding lasting 30min without stopping, please seek care with your PCP, urgent care, or ED  - if having any invasive procedure, please make sure the doctor knows of your history of blood clots and current anticoagulation status  - recommended to see your PCP to discuss if you need age-appropriate cancer screenings as a small % of blood clots may be caused by an underlying malignancy  - reversal agents for most blood thinners are now available and used if you have major bleeding

## 2022-02-14 NOTE — PROGRESS NOTES
NEW DOAC   .  Anticoagulation Summary  As of 2/14/2022    INR goal:     TTR:  --   INR used for dosing:  No new INR was available at the time of this encounter.   Warfarin maintenance plan:  No maintenance plan   Next INR check:  3/14/2022   Target end date:  6/21/2022    Indications    Acute ischemic stroke (HCC) [I63.9]             Anticoagulation Episode Summary     INR check location:      Preferred lab:      Send INR reminders to:      Comments:        Anticoagulation Care Providers     Provider Role Specialty Phone number    Rocco Mares M.D. Referring Internal Medicine [phone]    [facility]ticoagulation [facility] Responsible  [phone]        Anticoagulation Patient Findings      PCP: Pcp Pt States None  Cardiologist: none  Dx: acute ischemic CVA  CHADSVASC = n/a  HAS-BLED = 1 (age)  Target End Date = indefinite    Pt Hx: Pt hospitalized 1/27-2/10 for new onset stroke. He came in with facial drooping and weakness. Noted to be COVID-19 positive. He initially received IV TPA and underwent endovascular clot retrieval and had a hemorrhagic transformation of the stroke. On 1/29 had acute neuro changes again with right gaze deviation and worsening dysarthria and was taken back to the IR suite for additional thrombectomy of the M2 and P1 segment. Was started on a heparin drip and transitioned to Eliquis.  Hx of CVA in 1982. Also has DM2 with BKA.     Labs:  Lab Results   Component Value Date/Time    WBC 11.0 (H) 02/02/2022 04:30 AM    RBC 4.56 (L) 02/02/2022 04:30 AM    HEMOGLOBIN 15.0 02/02/2022 04:30 AM    HEMATOCRIT 43.0 02/02/2022 04:30 AM    MCV 94.3 02/02/2022 04:30 AM    MCH 32.9 02/02/2022 04:30 AM    MCHC 34.9 02/02/2022 04:30 AM    MPV 9.0 02/02/2022 04:30 AM    NEUTSPOLYS 67.40 01/27/2022 03:08 PM    LYMPHOCYTES 22.20 01/27/2022 03:08 PM    MONOCYTES 9.70 01/27/2022 03:08 PM    EOSINOPHILS 0.30 01/27/2022 03:08 PM    BASOPHILS 0.10 01/27/2022 03:08 PM      Lab Results   Component Value  Date/Time    SODIUM 136 02/02/2022 04:30 AM    POTASSIUM 3.9 02/02/2022 04:30 AM    CHLORIDE 103 02/02/2022 04:30 AM    CO2 23 02/02/2022 04:30 AM    GLUCOSE 94 02/02/2022 04:30 AM    BUN 13 02/02/2022 04:30 AM    CREATININE 0.82 02/02/2022 04:30 AM          Pt is new to Eliquis and new to [facility]. Discussed:   · Indication for DOAC therapy.  · Importance of monitoring and compliance.   · Monitoring parameters, signs and symptoms of bleeding or clotting.  · DOAC therapy, side effects, potential DDIs, OTC medications  · Hormonal therapy - to avoid  · Pregnancy - n/a  · DDI none  · Pt is not on and educated to avoid antiplatelet/NSAID therapy.  · Lifestyle safety, ie smoking, ETOH, hobby safety, fall safety/prevention  · Procedures for missed doses or suspected missed doses, surgeries/procedures, travel, dental work, any medication changes    Continue taking Eliquis 5 mg by mouth twice daily    DOAC affordable = no; currently uninsured by trying to get on medicare and medicaid. His family is helping him with paperwork. Gave information for patient assistance with Eliquis. Also given 30 day free coupon and ~10 monthly copay card. He will let us know the status of insurance at his next visit. Emphasized importance of this medication and instructed NOT to miss any doses. He is aware that warfarin is a most cost effective option and that we can switch him if needed.    Samples provided: yes; gave 5 weeks of medication.  Lives in [place], [place].    F/U - 4 weeks    LALO Gonzalez.    Cc:  Dr Bloch    Addendum: Spoke with pt and his daughter. Let them know that Eliquis is off label in this setting but that we will continue to use until otherwise directed by neurology. Pt and his daughter verbalize understanding. Will f/u in 1 month.

## 2022-02-15 NOTE — PROGRESS NOTES
Initial anticoagulation clinic note and most recent inpatient neurology notes and hospital discharge summary reviewed    Patient was started on Eliquis at the direction of neuro hospitalist for ischemic CVA, possibly related to Covid coagulopathy. Echocardiogram demonstrated no obvious source and ultrasound of lower extremity veins was negative for DVT    Although Eliquis is off label for this indication, reasonable to continue unless patient for outpatient neurology objects as this was the drug that was recommended by inpatient neurology    May need rhythm monitor in the future to completely rule out atrial fibrillation, but will leave that determination to neurology. Will defer further work-up and management to neurology unless otherwise requested and continue to concentrate on management of patient's day to day anticoagulation needs per referral.     Michael Bloch, MD  Anticoagulation Clinic    Cc:  TATIANNA Carvalho

## 2022-03-15 ENCOUNTER — TELEPHONE (OUTPATIENT)
Dept: VASCULAR LAB | Facility: MEDICAL CENTER | Age: 67
End: 2022-03-15
Payer: COMMERCIAL

## 2022-03-15 NOTE — TELEPHONE ENCOUNTER
Pt missed their f/u Eliquis appointment on 3/14.    Unable to LVM to reschedule as mb is full.  Will send letter    Emma Reese, DarcyD

## 2022-03-15 NOTE — LETTER
Geoff Scott  [address]  [place] [address]      Dear Geoff Scott ,    We have been unsuccessful in our attempts to contact you regarding your Anticoagulation Service referral.  Anticoagulants are medications that can cause potential harmful side effects when not monitored properly. Without proper monitoring there is potential for serious, sometimes life-threatening bleeding problems or life-threatening blood clots or stroke could result.    Please contact our clinic so we may assist you.  We are open Monday-Friday 8 am until 5 pm.  You may reach our Service at [phone].    To monitor your anticoagulant effectively, we need to be able to communicate with you.  This is a requirement to be followed by our Service.  Please contact the clinic as soon as possible to establish care and provide your current contact information.  Thank you.        Sincerely,        Nhan Reeves PharmD, [facility]  Pharmacy Clinical Supervisor  [facility]  Outpatient Anticoagulation Service

## 2022-03-22 ENCOUNTER — TELEPHONE (OUTPATIENT)
Dept: VASCULAR LAB | Facility: MEDICAL CENTER | Age: 67
End: 2022-03-22
Payer: COMMERCIAL

## 2022-03-22 NOTE — TELEPHONE ENCOUNTER
Pt missed their f/u Eliquis appointment on 3/14.     Unable to LVM to reschedule as mb is full.    Letter sent.    Yunior Ruiz, PharmD, BCACP

## 2022-03-25 ENCOUNTER — TELEPHONE (OUTPATIENT)
Dept: VASCULAR LAB | Facility: MEDICAL CENTER | Age: 67
End: 2022-03-25
Payer: COMMERCIAL

## 2022-03-25 NOTE — LETTER
Geoff Scott  [address]  [place] [place] [address]      Dear Geoff Scott ,    We have been unsuccessful in our attempts to contact you regarding your Anticoagulation Service referral.  Anticoagulants are medications that can cause potential harmful side effects when not monitored properly. Without proper monitoring there is potential for serious, sometimes life-threatening bleeding problems or life-threatening blood clots or stroke could result.    Please contact our clinic so we may assist you.  We are open Monday-Friday 8 am until 5 pm.  You may reach our Service at [phone].    To monitor your anticoagulant effectively, we need to be able to communicate with you.  This is a requirement to be followed by our Service.  Please contact the clinic as soon as possible to establish care and provide your current contact information.  Thank you.        Sincerely,        Nhan Reeves PharmD, [facility]  Pharmacy Clinical Supervisor  [facility]  Outpatient Anticoagulation Service

## 2022-03-25 NOTE — TELEPHONE ENCOUNTER
Pt missed their f/u Eliquis appointment on 3/14.     Unable to LVM to reschedule as mb is full.     Letter sent x 2.     Yunior Ruiz, PharmD, BCACP

## 2022-04-01 ENCOUNTER — TELEPHONE (OUTPATIENT)
Dept: VASCULAR LAB | Facility: MEDICAL CENTER | Age: 67
End: 2022-04-01
Payer: MEDICARE

## 2022-04-01 NOTE — TELEPHONE ENCOUNTER
Pt missed their f/u Eliquis appointment on 3/14.     Unable to LVM to reschedule as mb is full.    Fourth Call     Second letter sent last week.     Wilbert Joseph, DarcyD

## 2022-04-15 ENCOUNTER — TELEPHONE (OUTPATIENT)
Dept: VASCULAR LAB | Facility: MEDICAL CENTER | Age: 67
End: 2022-04-15
Payer: MEDICARE

## 2022-04-15 NOTE — TELEPHONE ENCOUNTER
Pt missed their f/u Eliquis appointment on 3/14.     Left voicemail with call back instructions.      5th Call     Second letter sent last week.     Wilbert Joseph, DarcyD

## 2022-04-18 ENCOUNTER — TELEPHONE (OUTPATIENT)
Dept: OCCUPATIONAL THERAPY | Facility: MEDICAL CENTER | Age: 67
End: 2022-04-18

## 2022-04-25 ENCOUNTER — ANTICOAGULATION MONITORING (OUTPATIENT)
Dept: VASCULAR LAB | Facility: MEDICAL CENTER | Age: 67
End: 2022-04-25
Payer: MEDICARE

## 2022-04-25 ENCOUNTER — TELEPHONE (OUTPATIENT)
Dept: VASCULAR LAB | Facility: MEDICAL CENTER | Age: 67
End: 2022-04-25
Payer: MEDICARE

## 2022-04-25 DIAGNOSIS — I63.9 ACUTE ISCHEMIC STROKE (HCC): ICD-10-CM

## 2022-04-25 NOTE — PROGRESS NOTES
Discharged from [facility] d/t noncompliance  Emma Reese, PharmD

## 2022-04-25 NOTE — TELEPHONE ENCOUNTER
Pt missed their f/u Eliquis appointment on 3/14.     6th call  LM to r/s missed appt  Also lm with pt's emergency contact    2 letters have been sent     Emma Reese, PharmD    CC Dr Bloch

## 2022-05-10 ENCOUNTER — TELEPHONE (OUTPATIENT)
Dept: OCCUPATIONAL THERAPY | Facility: MEDICAL CENTER | Age: 67
End: 2022-05-10

## 2022-05-10 NOTE — THERAPY
Called patient between 75 and 120 days after discharge. Assessed Modified [place] Scale to be 3.

## 2022-07-06 ENCOUNTER — HOSPITAL ENCOUNTER (OUTPATIENT)
Dept: RADIOLOGY | Facility: MEDICAL CENTER | Age: 67
End: 2022-07-06

## 2022-07-06 ENCOUNTER — HOSPITAL ENCOUNTER (INPATIENT)
Facility: REHABILITATION | Age: 67
LOS: 1 days | End: 2022-07-06
Attending: PHYSICAL MEDICINE & REHABILITATION | Admitting: PHYSICAL MEDICINE & REHABILITATION
Payer: COMMERCIAL

## 2022-07-06 ENCOUNTER — HOSPITAL ENCOUNTER (INPATIENT)
Facility: MEDICAL CENTER | Age: 67
LOS: 1 days | DRG: 057 | End: 2022-07-07
Attending: STUDENT IN AN ORGANIZED HEALTH CARE EDUCATION/TRAINING PROGRAM | Admitting: STUDENT IN AN ORGANIZED HEALTH CARE EDUCATION/TRAINING PROGRAM
Payer: MEDICARE

## 2022-07-06 DIAGNOSIS — I63.9 ACUTE STROKE DUE TO ISCHEMIA (HCC): ICD-10-CM

## 2022-07-06 PROBLEM — E78.5 HLD (HYPERLIPIDEMIA): Status: ACTIVE | Noted: 2022-07-06

## 2022-07-06 PROBLEM — I10 HTN (HYPERTENSION): Status: ACTIVE | Noted: 2022-07-06

## 2022-07-06 PROBLEM — G45.9 TIA (TRANSIENT ISCHEMIC ATTACK): Status: ACTIVE | Noted: 2022-07-06

## 2022-07-06 PROBLEM — Z86.73 H/O: CVA (CEREBROVASCULAR ACCIDENT): Status: ACTIVE | Noted: 2022-07-06

## 2022-07-06 PROCEDURE — 99254 IP/OBS CNSLTJ NEW/EST MOD 60: CPT | Performed by: PSYCHIATRY & NEUROLOGY

## 2022-07-06 PROCEDURE — 770010 HCHG ROOM/CARE - REHAB SEMI PRIVAT*

## 2022-07-06 PROCEDURE — 770020 HCHG ROOM/CARE - TELE (206)

## 2022-07-06 PROCEDURE — 99222 1ST HOSP IP/OBS MODERATE 55: CPT | Performed by: STUDENT IN AN ORGANIZED HEALTH CARE EDUCATION/TRAINING PROGRAM

## 2022-07-06 RX ORDER — ATORVASTATIN CALCIUM 80 MG/1
80 TABLET, FILM COATED ORAL EVERY EVENING
Status: DISCONTINUED | OUTPATIENT
Start: 2022-07-07 | End: 2022-07-07 | Stop reason: HOSPADM

## 2022-07-06 RX ORDER — HYDRALAZINE HYDROCHLORIDE 20 MG/ML
10 INJECTION INTRAMUSCULAR; INTRAVENOUS EVERY 4 HOURS PRN
Status: DISCONTINUED | OUTPATIENT
Start: 2022-07-06 | End: 2022-07-07 | Stop reason: HOSPADM

## 2022-07-06 RX ORDER — ONDANSETRON 4 MG/1
4 TABLET, ORALLY DISINTEGRATING ORAL EVERY 4 HOURS PRN
Status: DISCONTINUED | OUTPATIENT
Start: 2022-07-06 | End: 2022-07-07 | Stop reason: HOSPADM

## 2022-07-06 RX ORDER — ACETAMINOPHEN 325 MG/1
650 TABLET ORAL EVERY 6 HOURS PRN
Status: DISCONTINUED | OUTPATIENT
Start: 2022-07-06 | End: 2022-07-07 | Stop reason: HOSPADM

## 2022-07-06 RX ORDER — BISACODYL 10 MG
10 SUPPOSITORY, RECTAL RECTAL
Status: DISCONTINUED | OUTPATIENT
Start: 2022-07-06 | End: 2022-07-07 | Stop reason: HOSPADM

## 2022-07-06 RX ORDER — POLYETHYLENE GLYCOL 3350 17 G/17G
1 POWDER, FOR SOLUTION ORAL
Status: DISCONTINUED | OUTPATIENT
Start: 2022-07-06 | End: 2022-07-07 | Stop reason: HOSPADM

## 2022-07-06 RX ORDER — ATORVASTATIN CALCIUM 80 MG/1
80 TABLET, FILM COATED ORAL EVERY EVENING
Status: DISCONTINUED | OUTPATIENT
Start: 2022-07-06 | End: 2022-07-06

## 2022-07-06 RX ORDER — AMLODIPINE BESYLATE 5 MG/1
5 TABLET ORAL DAILY
Status: DISCONTINUED | OUTPATIENT
Start: 2022-07-07 | End: 2022-07-07 | Stop reason: HOSPADM

## 2022-07-06 RX ORDER — GUAIFENESIN/DEXTROMETHORPHAN 100-10MG/5
10 SYRUP ORAL EVERY 6 HOURS PRN
Status: DISCONTINUED | OUTPATIENT
Start: 2022-07-06 | End: 2022-07-07 | Stop reason: HOSPADM

## 2022-07-06 RX ORDER — ONDANSETRON 2 MG/ML
4 INJECTION INTRAMUSCULAR; INTRAVENOUS EVERY 4 HOURS PRN
Status: DISCONTINUED | OUTPATIENT
Start: 2022-07-06 | End: 2022-07-07 | Stop reason: HOSPADM

## 2022-07-06 RX ORDER — AMOXICILLIN 250 MG
2 CAPSULE ORAL 2 TIMES DAILY
Status: DISCONTINUED | OUTPATIENT
Start: 2022-07-06 | End: 2022-07-07 | Stop reason: HOSPADM

## 2022-07-06 RX ORDER — ASPIRIN 81 MG/1
81 TABLET, CHEWABLE ORAL DAILY
Status: DISCONTINUED | OUTPATIENT
Start: 2022-07-07 | End: 2022-07-07 | Stop reason: HOSPADM

## 2022-07-06 ASSESSMENT — LIFESTYLE VARIABLES
EVER HAD A DRINK FIRST THING IN THE MORNING TO STEADY YOUR NERVES TO GET RID OF A HANGOVER: NO
ALCOHOL_USE: NO
HAVE YOU EVER FELT YOU SHOULD CUT DOWN ON YOUR DRINKING: NO
EVER FELT BAD OR GUILTY ABOUT YOUR DRINKING: NO
TOTAL SCORE: 0
AVERAGE NUMBER OF DAYS PER WEEK YOU HAVE A DRINK CONTAINING ALCOHOL: 0
TOTAL SCORE: 0
CONSUMPTION TOTAL: NEGATIVE
HOW MANY TIMES IN THE PAST YEAR HAVE YOU HAD 5 OR MORE DRINKS IN A DAY: 0
SUBSTANCE_ABUSE: 0
HAVE PEOPLE ANNOYED YOU BY CRITICIZING YOUR DRINKING: NO
ON A TYPICAL DAY WHEN YOU DRINK ALCOHOL HOW MANY DRINKS DO YOU HAVE: 0
DOES PATIENT WANT TO STOP DRINKING: NO
TOTAL SCORE: 0

## 2022-07-06 ASSESSMENT — ENCOUNTER SYMPTOMS
SENSORY CHANGE: 0
CHILLS: 0
SHORTNESS OF BREATH: 0
DIZZINESS: 0
PALPITATIONS: 0
HEADACHES: 0
HEARTBURN: 0
TINGLING: 0
FEVER: 0
WEAKNESS: 0
TREMORS: 0
SPEECH CHANGE: 1
VOMITING: 0
MYALGIAS: 0
COUGH: 0
BRUISES/BLEEDS EASILY: 0
BLURRED VISION: 0
SEIZURES: 0
DOUBLE VISION: 0
FOCAL WEAKNESS: 0
DEPRESSION: 0
NAUSEA: 0
ABDOMINAL PAIN: 0
FLANK PAIN: 0
FALLS: 0
LOSS OF CONSCIOUSNESS: 0

## 2022-07-06 ASSESSMENT — COGNITIVE AND FUNCTIONAL STATUS - GENERAL
WALKING IN HOSPITAL ROOM: A LITTLE
TURNING FROM BACK TO SIDE WHILE IN FLAT BAD: A LITTLE
CLIMB 3 TO 5 STEPS WITH RAILING: A LITTLE
MOVING FROM LYING ON BACK TO SITTING ON SIDE OF FLAT BED: A LITTLE
SUGGESTED CMS G CODE MODIFIER DAILY ACTIVITY: CI
TOILETING: A LITTLE
SUGGESTED CMS G CODE MODIFIER MOBILITY: CK
MOBILITY SCORE: 19
TOILETING: A LITTLE
DAILY ACTIVITIY SCORE: 23
STANDING UP FROM CHAIR USING ARMS: A LITTLE

## 2022-07-06 ASSESSMENT — FIBROSIS 4 INDEX
FIB4 SCORE: 1.02
FIB4 SCORE: 1.02

## 2022-07-06 ASSESSMENT — PAIN DESCRIPTION - PAIN TYPE: TYPE: ACUTE PAIN

## 2022-07-06 NOTE — PROGRESS NOTES
I spoke with Dr. Jameson at [facility]. Mr Tyler is a past medical history of strokes in the past and presents to their hospital 3 days of stuttering episodes of slurred speech.  CT there reveals a subacute ischemic infarct.  CTA of the head is negative.  He spoke with Dr. Arechiga neurology who will consult.  Apparently he recently ran out of his Plavix and they have restarted there.  They have initiated high intensity statin.  He will be transferred here for further stroke work-up.

## 2022-07-07 ENCOUNTER — NON-PROVIDER VISIT (OUTPATIENT)
Dept: CARDIOLOGY | Facility: MEDICAL CENTER | Age: 67
End: 2022-07-07
Payer: COMMERCIAL

## 2022-07-07 VITALS
DIASTOLIC BLOOD PRESSURE: 77 MMHG | TEMPERATURE: 98.1 F | WEIGHT: 144.4 LBS | BODY MASS INDEX: 22.66 KG/M2 | RESPIRATION RATE: 16 BRPM | SYSTOLIC BLOOD PRESSURE: 116 MMHG | HEART RATE: 66 BPM | HEIGHT: 67 IN | OXYGEN SATURATION: 96 %

## 2022-07-07 DIAGNOSIS — G45.9 TIA (TRANSIENT ISCHEMIC ATTACK): ICD-10-CM

## 2022-07-07 LAB
ALBUMIN SERPL BCP-MCNC: 4.1 G/DL (ref 3.2–4.9)
BASOPHILS # BLD AUTO: 0.5 % (ref 0–1.8)
BASOPHILS # BLD: 0.04 K/UL (ref 0–0.12)
BUN SERPL-MCNC: 15 MG/DL (ref 8–22)
CALCIUM SERPL-MCNC: 9.3 MG/DL (ref 8.5–10.5)
CHLORIDE SERPL-SCNC: 104 MMOL/L (ref 96–112)
CHOLEST SERPL-MCNC: 160 MG/DL (ref 100–199)
CO2 SERPL-SCNC: 22 MMOL/L (ref 20–33)
CREAT SERPL-MCNC: 0.85 MG/DL (ref 0.5–1.4)
EOSINOPHIL # BLD AUTO: 0.11 K/UL (ref 0–0.51)
EOSINOPHIL NFR BLD: 1.5 % (ref 0–6.9)
ERYTHROCYTE [DISTWIDTH] IN BLOOD BY AUTOMATED COUNT: 51 FL (ref 35.9–50)
EST. AVERAGE GLUCOSE BLD GHB EST-MCNC: 143 MG/DL
GFR SERPLBLD CREATININE-BSD FMLA CKD-EPI: 96 ML/MIN/1.73 M 2
GLUCOSE SERPL-MCNC: 126 MG/DL (ref 65–99)
HBA1C MFR BLD: 6.6 % (ref 4–5.6)
HCT VFR BLD AUTO: 51.8 % (ref 42–52)
HDLC SERPL-MCNC: 35 MG/DL
HGB BLD-MCNC: 17.5 G/DL (ref 14–18)
IMM GRANULOCYTES # BLD AUTO: 0.02 K/UL (ref 0–0.11)
IMM GRANULOCYTES NFR BLD AUTO: 0.3 % (ref 0–0.9)
LDLC SERPL CALC-MCNC: 92 MG/DL
LYMPHOCYTES # BLD AUTO: 2.7 K/UL (ref 1–4.8)
LYMPHOCYTES NFR BLD: 36 % (ref 22–41)
MAGNESIUM SERPL-MCNC: 2.1 MG/DL (ref 1.5–2.5)
MCH RBC QN AUTO: 32.1 PG (ref 27–33)
MCHC RBC AUTO-ENTMCNC: 33.8 G/DL (ref 33.7–35.3)
MCV RBC AUTO: 94.9 FL (ref 81.4–97.8)
MONOCYTES # BLD AUTO: 0.63 K/UL (ref 0–0.85)
MONOCYTES NFR BLD AUTO: 8.4 % (ref 0–13.4)
NEUTROPHILS # BLD AUTO: 4.01 K/UL (ref 1.82–7.42)
NEUTROPHILS NFR BLD: 53.3 % (ref 44–72)
NRBC # BLD AUTO: 0 K/UL
NRBC BLD-RTO: 0 /100 WBC
PHOSPHATE SERPL-MCNC: 1.9 MG/DL (ref 2.5–4.5)
PLATELET # BLD AUTO: 253 K/UL (ref 164–446)
PMV BLD AUTO: 8.9 FL (ref 9–12.9)
POTASSIUM SERPL-SCNC: 4.1 MMOL/L (ref 3.6–5.5)
RBC # BLD AUTO: 5.46 M/UL (ref 4.7–6.1)
SODIUM SERPL-SCNC: 138 MMOL/L (ref 135–145)
TRIGL SERPL-MCNC: 163 MG/DL (ref 0–149)
WBC # BLD AUTO: 7.5 K/UL (ref 4.8–10.8)

## 2022-07-07 PROCEDURE — 700102 HCHG RX REV CODE 250 W/ 637 OVERRIDE(OP): Performed by: PSYCHIATRY & NEUROLOGY

## 2022-07-07 PROCEDURE — 97162 PT EVAL MOD COMPLEX 30 MIN: CPT

## 2022-07-07 PROCEDURE — 36415 COLL VENOUS BLD VENIPUNCTURE: CPT

## 2022-07-07 PROCEDURE — 80061 LIPID PANEL: CPT

## 2022-07-07 PROCEDURE — 99239 HOSP IP/OBS DSCHRG MGMT >30: CPT | Performed by: STUDENT IN AN ORGANIZED HEALTH CARE EDUCATION/TRAINING PROGRAM

## 2022-07-07 PROCEDURE — 85025 COMPLETE CBC W/AUTO DIFF WBC: CPT

## 2022-07-07 PROCEDURE — 99999 PR NO CHARGE: CPT | Performed by: STUDENT IN AN ORGANIZED HEALTH CARE EDUCATION/TRAINING PROGRAM

## 2022-07-07 PROCEDURE — A9270 NON-COVERED ITEM OR SERVICE: HCPCS | Performed by: PSYCHIATRY & NEUROLOGY

## 2022-07-07 PROCEDURE — 97166 OT EVAL MOD COMPLEX 45 MIN: CPT

## 2022-07-07 PROCEDURE — 80069 RENAL FUNCTION PANEL: CPT

## 2022-07-07 PROCEDURE — 83735 ASSAY OF MAGNESIUM: CPT

## 2022-07-07 PROCEDURE — 97535 SELF CARE MNGMENT TRAINING: CPT

## 2022-07-07 PROCEDURE — 92523 SPEECH SOUND LANG COMPREHEN: CPT

## 2022-07-07 PROCEDURE — 83036 HEMOGLOBIN GLYCOSYLATED A1C: CPT

## 2022-07-07 RX ORDER — ASPIRIN 81 MG/1
81 TABLET, CHEWABLE ORAL DAILY
Qty: 100 TABLET | Refills: 0 | Status: ON HOLD | OUTPATIENT
Start: 2022-07-08 | End: 2023-12-01

## 2022-07-07 RX ADMIN — ASPIRIN 81 MG: 81 TABLET, CHEWABLE ORAL at 05:31

## 2022-07-07 ASSESSMENT — PAIN DESCRIPTION - PAIN TYPE: TYPE: ACUTE PAIN

## 2022-07-07 ASSESSMENT — COGNITIVE AND FUNCTIONAL STATUS - GENERAL
DAILY ACTIVITIY SCORE: 24
SUGGESTED CMS G CODE MODIFIER MOBILITY: CJ
MOBILITY SCORE: 21
CLIMB 3 TO 5 STEPS WITH RAILING: A LITTLE
SUGGESTED CMS G CODE MODIFIER DAILY ACTIVITY: CH
STANDING UP FROM CHAIR USING ARMS: A LITTLE
WALKING IN HOSPITAL ROOM: A LITTLE

## 2022-07-07 ASSESSMENT — GAIT ASSESSMENTS
DISTANCE (FEET): 150
GAIT LEVEL OF ASSIST: SUPERVISED
DEVIATION: SHUFFLED GAIT

## 2022-07-07 ASSESSMENT — ACTIVITIES OF DAILY LIVING (ADL): TOILETING: INDEPENDENT

## 2022-07-07 NOTE — DISCHARGE SUMMARY
Discharge Summary    CHIEF COMPLAINT ON ADMISSION  No chief complaint on file.      Reason for Admission  Subacute stroke     Admission Date  7/6/2022    CODE STATUS  Full Code    HPI & HOSPITAL COURSE  This is a 67 y.o. male here with dysarthria. Patient with history of right MCA stroke 1/2022 on eliquis, who presented for dysarthria. Patient unable to afford eliquis medication and had been off of it for two weeks. CT head shows chronic right MCA infarct, no acute infarcts. CTA head and neck negative for large vessel occlusion. Neurology consulted who recommend stopping eliquis as this was started for COVID related hypercoagulability and is no longer needed. Patient started on aspirin. Ziopatch ordered for patient for cardiac monitoring. Patient's symptoms may be related to recrudescence of old stroke and not acute stroke. Patient feeling well, symptoms improving. Patient to follow up with stroke clinic.    Therefore, he is discharged in good and stable condition to home with close outpatient follow-up.    The patient recovered much more quickly than anticipated on admission.    Discharge Date  7/7/2022    FOLLOW UP ITEMS POST DISCHARGE  Take medications as prescribed.  Follow up with PCP and stroke clinic.    DISCHARGE DIAGNOSES  Principal Problem:    TIA (transient ischemic attack) POA: Unknown  Active Problems:    Acute stroke due to ischemia (HCC) POA: Yes    H/O: CVA (cerebrovascular accident) POA: Unknown    HLD (hyperlipidemia) POA: Unknown    HTN (hypertension) POA: Unknown  Resolved Problems:    * No resolved hospital problems. *      FOLLOW UP  No future appointments.  [facility] - Neurology  [address], [address]  [facility] [address] [phone]  Follow up in 2 week(s)        MEDICATIONS ON DISCHARGE     Medication List      START taking these medications      Instructions   aspirin 81 MG Chew chewable tablet  Start taking on: July 8, 2022  Commonly known as: ASA   Chew 1  Tablet every day.  Dose: 81 mg        CONTINUE taking these medications      Instructions   amLODIPine 5 MG Tabs  Commonly known as: NORVASC   Take 1 Tablet by mouth every day.  Dose: 5 mg     atorvastatin 80 MG tablet  Commonly known as: LIPITOR   Take 1 Tablet by mouth every evening.  Dose: 80 mg        STOP taking these medications    apixaban 5mg Tabs  Commonly known as: ELIQUIS            Allergies  Allergies   Allergen Reactions   • Banana Hives and Itching   • Grape, Artificial Hives and Itching     ALLERGIC TO GRAPES.       DIET  Orders Placed This Encounter   Procedures   • Diet Order Diet: Cardiac     Standing Status:   Standing     Number of Occurrences:   1     Order Specific Question:   Diet:     Answer:   Cardiac [6]       ACTIVITY  As tolerated.  Weight bearing as tolerated    CONSULTATIONS  neurology    PROCEDURES  none    LABORATORY  Lab Results   Component Value Date    SODIUM 138 07/07/2022    POTASSIUM 4.1 07/07/2022    CHLORIDE 104 07/07/2022    CO2 22 07/07/2022    GLUCOSE 126 (H) 07/07/2022    BUN 15 07/07/2022    CREATININE 0.85 07/07/2022        Lab Results   Component Value Date    WBC 7.5 07/07/2022    HEMOGLOBIN 17.5 07/07/2022    HEMATOCRIT 51.8 07/07/2022    PLATELETCT 253 07/07/2022        Total time of the discharge process exceeds 34 minutes.

## 2022-07-07 NOTE — CONSULTS
Neurology Initial Consult H&P  Neurohospitalist Service, [facility]    Referring Physician: Linwood Morley M.D.    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  He presented to [facility] after family members noted intermittent slurred speech and generalized malaise.  Family went to nearby [place] for July 4th festivities. They noted that Eddi throughout the course of the last several days to intermittent slurred speech, appearing more lethargic, and at times slumping to the L side.  Earlier today, they noted that he was more short-tempered.  He went to [facility] to be evaluated.  A head CT was completed and revealed chronic R MCA infarct, primarily in R frontal and parietal lobes.  A CT angiogram of head and neck was a normal study with no large vessel occlusion.  He was transferred to [facility] for stroke evaluation.  On arrival, Eddi reports no issues, and feels he is at his neurologic baseline.  He reports that he ran out of eliquis about 2 weeks ago.  Family started him on a daily ASA.  Otherwise he is compliant on his other home medications of atorvastatin and amlodipine.  On ROS he denies infectious prodrome, constitutional symptoms.  He actually has a made a remarkable motor recovery since I last saw him after his relatively large R MCA stroke.  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  He was started on eliquis, meant to be short-term for presumed COVID hypercoagulability.    Review of systems: In addition to what is detailed in the HPI above, all other systems reviewed and are negative.    Past Medical History:    has a past medical history of right BKA (HCC) and Stroke (HCC).    FHx:  No family history of early strokes or blood clot disorders.    SHx:   reports  "that he has been smoking. He has never used smokeless tobacco. He reports current drug use. Drug: Inhaled. He reports that he does not drink alcohol.    Allergies:  Allergies   Allergen Reactions   • Banana Hives and Itching   • Grape, Artificial Hives and Itching     ALLERGIC TO GRAPES.       Medications:    Current Facility-Administered Medications:   •  senna-docusate (PERICOLACE or SENOKOT S) 8.6-50 MG per tablet 2 Tablet, 2 Tablet, Oral, BID **AND** polyethylene glycol/lytes (MIRALAX) PACKET 1 Packet, 1 Packet, Oral, QDAY PRN **AND** magnesium hydroxide (MILK OF MAGNESIA) suspension 30 mL, 30 mL, Oral, QDAY PRN **AND** bisacodyl (DULCOLAX) suppository 10 mg, 10 mg, Rectal, QDAY PRN, Linwood Morley M.D.  •  acetaminophen (Tylenol) tablet 650 mg, 650 mg, Oral, Q6HRS PRN, Linwood Morley M.D.  •  hydrALAZINE (APRESOLINE) injection 10 mg, 10 mg, Intravenous, Q4HRS PRN, Linwood Morley M.D.  •  ondansetron (ZOFRAN) syringe/vial injection 4 mg, 4 mg, Intravenous, Q4HRS PRN, Linwood Morley M.D.  •  ondansetron (ZOFRAN ODT) dispertab 4 mg, 4 mg, Oral, Q4HRS PRN, Linwood Morley M.D.  •  guaiFENesin dextromethorphan (ROBITUSSIN DM) 100-10 MG/5ML syrup 10 mL, 10 mL, Oral, Q6HRS PRN, Linwood Morley M.D.  •  [START ON 7/7/2022] amLODIPine (NORVASC) tablet 5 mg, 5 mg, Oral, DAILY, Linwood Morley M.D.  •  [START ON 7/7/2022] apixaban (ELIQUIS) tablet 5 mg, 5 mg, Oral, BID, Linwood Morley M.D.  •  [START ON 7/7/2022] atorvastatin (LIPITOR) tablet 80 mg, 80 mg, Oral, Q EVENING, Linwood Morley M.D.    Physical Examination:     General: Patient is awake and in no acute distress  Eye: Examination of optic disks not indicated at this time given acuity of consult  Neck: There is normal range of motion  CV: regular rate   Extremities:  clear, dry, intact, without peripheral edema.  R BKA.    NEUROLOGICAL EXAM:     Ht 1.702 m (5' 7.01\")   Wt 65.5 kg (144 lb 6.4 oz)   BMI 22.61 kg/m²       Mental status: Awake, alert and fully oriented  Speech " and language: Speech is clear and fluent. The patient is able to name and repeat, and follow commands  Cranial nerve exam:  Visual fields are full. There is no nystagmus. Extraocular muscles are intact. Face is symmetric. Sensation in the face is intact to light touch. Palate elevates symmetrically. Tongue is midline.  Motor exam: There is sustained antigravity with no downward drift in bilateral arms and legs.  There is no pronator drift. Tone is normal. No abnormal movements were seen on exam.  Sensory exam: Reacts to tactile in all 4 extremities, no neglect to double stim   Coordination: No ataxia on bilateral finger-to-nose testing  Gait: Deferred due to patient preference      NIHSS: National Institutes of Health Stroke Scale    [0] 1a:Level of Consciousness    0-alert 1-drowsy   2-stupor   3-coma  [0] 1b:LOC Questions                  0-both  1-one      2-neither  [0] 1c:LOC Commands                   0-both  1-one      2-neither  [0] 2: Best Gaze                     0-nl    1-partial  2-forced  [0] 3: Visual Fields                   0-nl    1-partial  2-complete 3-bilat  [0] 4: Facial Paresis                0-nl    1-minor    2-partial  3-full  MOTOR                       0-nl  [0] 5: Right Arm           1-drift  [0] 6: Left Arm             2-some effort vs gravity  [0] 7: Right Leg           3-no effort vs gravity  [0] 8: Left Leg             4-no movement                             x-untestable  [0] 9: Limb Ataxia                    0-abs   1-1_limb   2-2+_limbs       x-untestable  [0] 10:Sensory                        0-nl    1-partial  2-dense  [0] 11:Best Language/Aphasia         0-nl    1-mild/mod 2-severe   3-mute  [0] 12:Dysarthria                     0-nl    1-mild/mod 2-severe       x-untestable  [0] 13:Neglect/Inattention            0-none  1-partial  2-complete  [0] TOTAL      Objective Data:    Labs:  Lab Results   Component Value Date/Time    PROTHROMBTM 15.1 (H) 01/29/2022 11:58 AM    INR 1.23  (H) 01/29/2022 11:58 AM      Lab Results   Component Value Date/Time    WBC 11.0 (H) 02/02/2022 04:30 AM    RBC 4.56 (L) 02/02/2022 04:30 AM    HEMOGLOBIN 15.0 02/02/2022 04:30 AM    HEMATOCRIT 43.0 02/02/2022 04:30 AM    MCV 94.3 02/02/2022 04:30 AM    MCH 32.9 02/02/2022 04:30 AM    MCHC 34.9 02/02/2022 04:30 AM    MPV 9.0 02/02/2022 04:30 AM    NEUTSPOLYS 67.40 01/27/2022 03:08 PM    LYMPHOCYTES 22.20 01/27/2022 03:08 PM    MONOCYTES 9.70 01/27/2022 03:08 PM    EOSINOPHILS 0.30 01/27/2022 03:08 PM    BASOPHILS 0.10 01/27/2022 03:08 PM      Lab Results   Component Value Date/Time    SODIUM 136 02/02/2022 04:30 AM    POTASSIUM 3.9 02/02/2022 04:30 AM    CHLORIDE 103 02/02/2022 04:30 AM    CO2 23 02/02/2022 04:30 AM    GLUCOSE 94 02/02/2022 04:30 AM    BUN 13 02/02/2022 04:30 AM    CREATININE 0.82 02/02/2022 04:30 AM      Lab Results   Component Value Date/Time    CHOLSTRLTOT 183 01/28/2022 04:50 AM     (H) 01/28/2022 04:50 AM    HDL 23 (A) 01/28/2022 04:50 AM    TRIGLYCERIDE 128 01/28/2022 04:50 AM       Lab Results   Component Value Date/Time    ALKPHOSPHAT 100 (H) 01/29/2022 02:20 AM    ASTSGOT 24 01/29/2022 02:20 AM    ALTSGPT 30 01/29/2022 02:20 AM    TBILIRUBIN 0.9 01/29/2022 02:20 AM        Imaging/Testing:    I interpreted and/or reviewed the patient's neuroimaging    CTH from OSH:  R cortical encephalomalacia consistent with chronic infarction, primarily in R frontal and parietal cortices    CT angiogram of head and neck from OSH:  No critical stenosis, no large vessel occlusion    Assessment and Plan:  Geoff Scott is a 67 year-old man with transient slurred speech, generalized malaise for last couple days, currently at his neurologic baseline.  Symptoms noted are previous symptoms of his R MCA stroke, with no new lateralizing symptoms.  Given transient nature, stereotypy, and worsening of prior stroke symptoms I suspect stroke recrudescence and not new ischemia.  Non contrast head CT with  no evidence of new ischemia- the chronic findings match the stroke burden seen on his MRI from his last stroke admission.  He does not need to be on eliquis at this time as this was meant to protect from COVID-related hypercoagulability.  I do recommend he complete his stroke workup which is a long-term cardiac monitor to evaluate for occult atrial fibrillation.    Problem list:  1.  Transient neurologic symptoms  2.  History of R MCA stroke    Plan:   - q4h neurochecks ok   - stop apixaban, start ASA 81mg daily   - continue atorvastatin for LDL goal < 70   - continue amlodipine for long-term BP goal 110-130/60-80   - no need for MRI or ECHO at this time, as unlikely new ischemic process   - PT/OT/SLP clearance for discharge   - long-term cardiac monitoring at discharge (order placed)   - stroke bridge clinic follow up   - please reconsult Neurology with any additional questions or concerns.    The evaluation of the patient, and recommended management, was discussed with the attending hospitalist.    Alec Arechiga MD  Neurohospitalist, Acute Care Services

## 2022-07-07 NOTE — THERAPY
"Physical Therapy   Initial Evaluation     Patient Name: Geoff Scott  Age:  67 y.o., Sex:  male  Medical Record #: [id number]  Today's Date: 7/7/2022     Precautions  Precautions: Fall Risk  Comments: L side neglect, peripheral vision loss. R BKA w/ prosthesis    Assessment  Patient is 67 y.o. male admitted with impaired speech, stroke work up negative for acute infarct. Known chronic MCA CVA Jan 2022 with L weakness and impaired peripheral vision. PMHx of R BKA with prosthesis, HTN, HLD. Pt appears to be at baseline for balance and mobility. Required SPV to ambulate 150ft with no AD and negotiate 5 steps with B rails. Pt with impaired safety awareness 2/2 L neglect and impaired peripheral vision, does not compensate well despite self awareness. Pt lives with wife and 2 grandchildren who are able to assist as needed. Pt with no further acute PT needs, will d/c pt from PT.    Edu pt on self management and compensatory strategies with mobility, scanning L to compensate for impaired L peripheral vision.     Plan    Recommend Physical Therapy for Evaluation only     DC Equipment Recommendations: None  Discharge Recommendations: Anticipate that the patient will have no further physical therapy needs after discharge from the hospital       Subjective    \"You want me to run or jog?\"   \"I'm the one supposed to be getting medical care here and you won't let me run.\"      Objective     07/07/22 0853   Vitals   O2 Delivery Device None - Room Air   Pain 0 - 10 Group   Therapist Pain Assessment Post Activity Pain Same as Prior to Activity;Nurse Notified;0   Prior Living Situation   Prior Services None   Housing / Facility 1 Story House   Steps Into Home 5   Steps In Home 0   Rail Both Rail (Steps into Home)   Bathroom Set up Bathtub / Shower Combination;Shower Chair   Equipment Owned Front-Wheel Walker;Single Point Cane   Lives with - Patient's Self Care Capacity Spouse  (and 2 grandchildren)   Comments Reports both " grandchildren (15) are able to assist   Prior Level of Functional Mobility   Bed Mobility Independent   Transfer Status Independent   Ambulation Independent   Distance Ambulation (Feet)   (community)   Assistive Devices Used None  (R prothesis)   Stairs Independent   Cognition    Cognition / Consciousness X   Level of Consciousness Alert   Ability To Follow Commands 2 Step   Safety Awareness Impaired   Comments Pleasant and cooperative. Cues for safety awareness,  attending to L side 2/2 neglect and peripheral vision impairement   Passive ROM Lower Body   Passive ROM Lower Body WDL   Active ROM Lower Body    Active ROM Lower Body  WDL   Strength Lower Body   Lower Body Strength  WDL   Sensation Lower Body   Lower Extremity Sensation   WDL   Comments denies N/T, intact to LT BLE   Lower Body Muscle Tone   Lower Body Muscle Tone  WDL   Strength Upper Body   Upper Body Strength  WDL   Upper Body Muscle Tone   Upper Body Muscle Tone  WDL   Coordination Upper Body   Coordination X   Comments dysmetria on L finger to nose, R WDL   Coordination Lower Body    Coordination Lower Body  WDL   Vision   Vision Comments Impaired L peripheral vision (baseline), pt does not compensate well despite being aware. Intact visual acuity with glasses donned   Balance Assessment   Sitting Balance (Static) Fair +   Sitting Balance (Dynamic) Fair   Standing Balance (Static) Fair   Standing Balance (Dynamic) Fair -   Weight Shift Sitting Fair   Weight Shift Standing Fair   Comments no AD or LOB   Gait Analysis   Gait Level Of Assist Supervised   Assistive Device None   Distance (Feet) 150   # of Times Distance was Traveled 1   Deviation Shuffled Gait   # of Stairs Climbed 5   Level of Assist with Stairs Supervised   Weight Bearing Status no restrictions   Vision Deficits Impacting Mobility Pt bumping into objects on L despite being aware of L impaired peripheral vision. Required cues to scan L and avoid objects   Bed Mobility    Supine to Sit  Modified Independent   Sit to Supine   (NT, up in BR with OT post PT session)   Scooting Modified Independent   Rolling Modified Independent   Functional Mobility   Sit to Stand Supervised   Bed, Chair, Wheelchair Transfer Supervised   Transfer Method Stand Step   Mobility hallway with no AD   How much difficulty does the patient currently have...   Turning over in bed (including adjusting bedclothes, sheets and blankets)? 4   Sitting down on and standing up from a chair with arms (e.g., wheelchair, bedside commode, etc.) 4   Moving from lying on back to sitting on the side of the bed? 4   How much help from another person does the patient currently need...   Moving to and from a bed to a chair (including a wheelchair)? 3   Need to walk in a hospital room? 3   Climbing 3-5 steps with a railing? 3   6 clicks Mobility Score 21   Activity Tolerance   Comments no overt s/s fatigue   Education Group   Education Provided Role of Physical Therapist;Gait Training;Stair Training   Role of Physical Therapist Patient Response Patient;Acceptance;Explanation;Action Demonstration   Gait Training Patient Response Patient;Acceptance;Explanation;Action Demonstration   Stair Training Patient Response Patient;Acceptance;Explanation;Action Demonstration   Additional Comments Edu on self management and compensatory strategies with mobility, scanning L to compensate for impaired L peripheral vision   Problem List    Problems Impaired Vision  (baseline)   Anticipated Discharge Equipment and Recommendations   DC Equipment Recommendations None   Discharge Recommendations Anticipate that the patient will have no further physical therapy needs after discharge from the hospital   Interdisciplinary Plan of Care Collaboration   IDT Collaboration with  Nursing;Occupational Therapist   Patient Position at End of Therapy   (handoff, up in BR with OT post session)   Collaboration Comments RN updated   Session Information   Date / Session Number   7/7: Eval only. D/C PT

## 2022-07-07 NOTE — THERAPY
"Occupational Therapy   Initial Evaluation     Patient Name: Geoff Scott  Age:  67 y.o., Sex:  male  Medical Record #: [id number]  Today's Date: 7/7/2022     Precautions: Fall Risk  Comments: Left side neglect    Assessment    Patient is 67 y.o. male with a with h/o right MCA CVA 01/2022, presented to OSH for 3 days of stuttering episodes of slurred speech, transferred to [facility] for CVA work-up. Pt received up w/ PT & was pleasant and cooperative during tx. Additional factors influencing patient status / progress: left sided neglect, limited safety awareness, and balance deficits. Pt demo toileting & chair transfer w/ SPV, no AD and min VC for safety. Pt notes that he is back at his new baseline (since stroke in Jan). Pt states that his Grandson lives close and is able to visit daily to assist if needed. No further OT needs at this time.     Plan    Recommend Occupational Therapy for Evaluation only.     DC Equipment Recommendations: Grab Bar(s) in Tub / Shower, Grab Bar(s) by Toilet  Discharge Recommendations: Anticipate that the patient will have no further occupational therapy needs after discharge from the hospital     Subjective    \"It just takes me a little to get going in the morning.\"      Objective       07/07/22 0814   Initial Contact Note    Initial Contact Note Order Received and Verified, Evaluation Only - Patient Does Not Require Further Acute Occupational Therapy at this Time.  However, May Benefit from Post Acute Therapy for Higher Level Functional Deficits.   Prior Living Situation   Prior Services None  (Grandson helps)   Housing / Facility 1 Story House   Steps Into Home 5   Steps In Home 0   Bathroom Set up Bathtub / Shower Combination;Shower Chair   Equipment Owned Tub / Shower Seat   Lives with - Patient's Self Care Capacity Spouse   Comments Notes Grandson comes over to assist 2x per day   Prior Level of ADL Function   Self Feeding Independent   Grooming / Hygiene Independent   Bathing " Independent   Dressing Independent   Toileting Independent   Precautions   Precautions Fall Risk   Comments Left side neglect   Vitals   O2 Delivery Device None - Room Air   Cognition    Cognition / Consciousness X   Level of Consciousness Alert   Ability To Follow Commands 2 Step   Safety Awareness Impaired   Active ROM Upper Body   Active ROM Upper Body  WDL   Sensation Upper Body   Upper Extremity Sensation  X   Lt Upper Extremity Light Touch Impaired   Balance Assessment   Sitting Balance (Static) Fair +   Sitting Balance (Dynamic) Fair   Standing Balance (Static) Fair   Standing Balance (Dynamic) Fair -   Weight Shift Sitting Fair   Weight Shift Standing Fair   Bed Mobility    Supine to Sit   (Recieved up w/ PT)   Sit to Supine   (Left up in chair)   Scooting Independent   ADL Assessment   Eating Independent   Grooming Supervision   Toileting Supervision   How much help from another person does the patient currently need...   Putting on and taking off regular lower body clothing? 4   Bathing (including washing, rinsing, and drying)? 4   Toileting, which includes using a toilet, bedpan, or urinal? 4   Putting on and taking off regular upper body clothing? 4   Taking care of personal grooming such as brushing teeth? 4   Eating meals? 4   6 Clicks Daily Activity Score 24   Functional Mobility   Sit to Stand Supervised   Bed, Chair, Wheelchair Transfer Supervised   Toilet Transfers Supervised   Transfer Method Stand Step   Comments No device   Visual Perception   Visual Perception  X   Neglect Moderate Left   Activity Tolerance   Sitting in Chair Left up in chair   Standing 10 min   Education Group   Education Provided Role of Occupational Therapist;Activities of Daily Living;Home Safety   Role of Occupational Therapist Patient Response Patient;Acceptance;Explanation;Verbal Demonstration   Home Safety Patient Response Patient;Acceptance;Explanation;Verbal Demonstration   ADL Patient Response  Patient;Acceptance;Explanation;Verbal Demonstration   Anticipated Discharge Equipment and Recommendations   DC Equipment Recommendations Grab Bar(s) in Tub / Shower;Grab Bar(s) by Toilet   Discharge Recommendations Anticipate that the patient will have no further occupational therapy needs after discharge from the hospital   Interdisciplinary Plan of Care Collaboration   IDT Collaboration with  Nursing;Physical Therapist   Patient Position at End of Therapy Chair Alarm On;Seated;Phone within Reach;Tray Table within Reach;Call Light within Reach   Collaboration Comments RN updated   Session Information   Date / Session Number  7/7, 1 ( eval only)   Priority 0

## 2022-07-07 NOTE — CARE PLAN
The patient is Stable - Low risk of patient condition declining or worsening    Shift Goals  Clinical Goals: Stable Neuro exam  Patient Goals: go home    Progress made toward(s) clinical / shift goals:  patient able to verbalize wants and needs, neuro status remains unchanged, patient passed bedside swallow eval.       Problem: Dysphagia  Goal: Dysphagia will improve  Outcome: Progressing     Problem: Neuro Status  Goal: Neuro status will remain stable or improve  Outcome: Progressing     Problem: Psychosocial - Patient Condition  Goal: Patient's ability to verbalize feelings about condition will improve  Outcome: Progressing       Patient is not progressing towards the following goals:

## 2022-07-07 NOTE — THERAPY
Speech Language Pathology   Cognitive-Linguistic Evaluation     Patient Name: Geoff Scott  AGE:  67 y.o., SEX:  male  Medical Record #: [id number]  Today's Date: 2022     Precautions  Precautions: Fall Risk  Comments: L side neglect, peripheral vision loss. R BKA w/ prosthesis    Assessment    HPI: 66 y/o M admitted 22 from outside hospital for a stroke workup due to dysarthria, increased lethargy. Pt had run out of his Eliquis 2 weeks prior to this event. A head CT was completed at OSH and revealed chronic R MCA infarct, primarily in R frontal and parietal lobes.  A CT angiogram of head and neck was a normal study with no large vessel occlusion. Cog eval was done during last admit 22 revealing moderate-severe deficits, including L neglect.    PMhx includes R BKA, R MCA stroke 2022 thought 2' COVID related hypercoagulability.     Prior Living Situation/Level of Function:  Patient lives w/ his wife who manages IADLs and supervises for walking. His grandson does the shopping.    Subjective:  Patient reports no new cognitive changes since his TIA.    Results:  COGNISTAT  Orientation: Average  Attention: Average  Comprehension: Average  Repetition: Average  Naming: Average  Memory: Borderline Mild-Average  Calculations: Average  Similarities: Average  Judgement: Average    CLQT Clock Drawin/13 = Severe    Impressions:  Pt presents with mild deficits in memory and moderate deficits in executive functioning, likely negatively impacted by L inattention vs. field cut. Deficits appear consistent w/ his premorbid baseline given pt had a recent R CVA in 2022. Patient also agrees he is at his baseline and states his wife and grandson will continue to assist w/ IADLs at home. No further acute SLP needs at this time.     Recommendations:  Supervision Needs Upon Discharge: The pt will benefit from direct assistance for the following IADLs: Medication management, Financial management, Appointment  management, Cooking.     Plan    Recommend Speech Therapy for Evaluation only for the following treatments:  Cognitive-Linguistic Training and Patient / Family / Caregiver Education.    Discharge Recommendations: Anticipate that the patient will have no further speech therapy needs after discharge from the hospital       Objective       07/07/22 1110   Verbal Expression   Verbal Expression / Aphasia Eval (WDL) WDL   Auditory Comprehension   Auditory Comprehension (WDL) WDL   Reading Comprehension   Reading Sentences Minimal (4)   Barriers to Reading Vision / Visual Processing   Cognitive-Linguistic   Level of Consciousness Alert   Orientation Level Oriented x 4   Short Term Memory Supervision (5)   Executive Functioning / Organization Moderate (3)   Social / Pragmatic Communication   Social / Pragmatic Communication WDL   Session Information   Priority 0  (Eval only)

## 2022-07-07 NOTE — PROGRESS NOTES
4 Eyes Skin Assessment Completed by SEBAS Quintero and SEBAS Mora.    Head WDL  Ears WDL  Nose WDL  Mouth WDL  Neck WDL  Breast/Chest WDL  Shoulder Blades WDL  Spine WDL  (R) Arm/Elbow/Hand WDL  (L) Arm/Elbow/Hand WDL  Abdomen WDL  Groin WDL  Scrotum/Coccyx/Buttocks WDL  (R) Leg Redness, prostetic  (L) Leg Scab and Abrasion  (R) Heel/Foot/Toe WDL  (L) Heel/Foot/Toe WDL          Devices In Places Tele Box and Pulse Ox      Interventions In Place Low Air Loss Mattress    Possible Skin Injury No    Pictures Uploaded Into Epic N/A  Wound Consult Placed N/A  RN Wound Prevention Protocol Ordered No

## 2022-07-07 NOTE — PROGRESS NOTES
Monitor summary: SR 65-82, NV -0.17, QRS -0.08, QT -0.42, per strip from the monitor room.

## 2022-07-07 NOTE — DISCHARGE INSTRUCTIONS
"Discharge Instructions    Discharged to home by car with relative. Discharged via wheelchair, hospital escort: Yes.  Special equipment needed: Not Applicable    Be sure to schedule a follow-up appointment with your primary care doctor or any specialists as instructed.     Discharge Plan:   Diet Plan: (P) Discussed  Activity Level: (P) Discussed  Confirmed Follow up Appointment: (P) Patient to Call and Schedule Appointment  Confirmed Symptoms Management: (P) Discussed  Medication Reconciliation Updated: (P) Yes    I understand that a diet low in cholesterol, fat, and sodium is recommended for good health. Unless I have been given specific instructions below for another diet, I accept this instruction as my diet prescription.   Other diet: Regular    Special Instructions:     Stroke/CVA/TIA/Hemorrhagic Ischemia Discharge Instructions  You have had a stroke. Your risk factors have been identified as follows:  Age - Over 55  Gender - Men are at a higher risk than women  High blood pressure  Heart disease  Previous TIAs or \"mini strokes\"  High Cholesterol and lipids  Alcohol or drug abuse  It is important that you reduce your risk factors to avoid another stroke in the future. Here are some general guidelines to follow:  Eat healthy - avoid food high in fat.  Get regular exercise.  Maintain a healthy weight.  Avoid smoking.  Avoid alcohol and illegal drug use.  Take your medications as directed.  For more information regarding risk factors, refer to pages 17-19 in your Stroke Patient Education Guide. Stroke Education Guide was given to patient and family member.    Warning signs of a stroke include (which can also be found on page 3 of your Stroke Patient Education Guide):  Sudden numbness of weakness of the face, arm or leg (especially on one side of the body).  Sudden confusion, trouble speaking or understanding.  Sudden trouble seeing in one or both eyes.  Sudden trouble walking, dizziness, loss of balance or " coordination.  Sudden severe headache with no known cause.  It is very important to get treatment quickly when a stroke occurs. If you experience any of the above warning signs, call 911 immediately.     Some patients who have had a stroke will be going home on a blood thinner medication called Warfarin (Coumadin).  This medication requires very close monitoring and follow up.  This follow up can be provided by either your Primary Care Physician or by [facility] Outpatient Anticoagulation Service.  The Outpatient Anticoagulation Service is located at the [facility] at [facility] ([facility]).  If you do not know when your follow up appointment is scheduled, call [phone] to verify your appointment time.    -Is this patient being discharged with medication to prevent blood clots?  Yes, Aspirin   Aspirin, ASA oral tablets  What is this medicine?  ASPIRIN (AS pir in) is a pain reliever. It is used to treat mild pain and fever. This medicine is also used as directed by a doctor to prevent and to treat heart attacks, to prevent strokes and blood clots, and to treat arthritis or inflammation.  This medicine may be used for other purposes; ask your health care provider or pharmacist if you have questions.  COMMON BRAND NAME(S): Aspir-Low, Aspir-Lisa, Aspirtab, Florentino Advanced Aspirin, Florentino Aspirin, Florentino Aspirin Extra Strength, Florentino Aspirin Plus, Folrentino Extra Strength, Florentino Extra Strength Plus, Florentino Genuine Aspirin, Florentino Womens Aspirin, Bufferin, Bufferin Extra Strength, Bufferin Low Dose  What should I tell my health care provider before I take this medicine?  They need to know if you have any of these conditions:  anemia  asthma  bleeding problems  child with chickenpox, the flu, or other viral infection  diabetes  gout  if you frequently drink alcohol containing drinks  kidney disease  liver disease  low level of vitamin K  lupus  smoke tobacco  stomach ulcers  or other problems  an unusual or allergic reaction to aspirin, tartrazine dye, other medicines, dyes, or preservatives  pregnant or trying to get pregnant  breast-feeding  How should I use this medicine?  Take this medicine by mouth with a glass of water. Follow the directions on the package or prescription label. You can take this medicine with or without food. If it upsets your stomach, take it with food. Do not take your medicine more often than directed.  Talk to your pediatrician regarding the use of this medicine in children. While this drug may be prescribed for children as young as 12 years of age for selected conditions, precautions do apply. Children and teenagers should not use this medicine to treat chicken pox or flu symptoms unless directed by a doctor.  Patients over 65 years old may have a stronger reaction and need a smaller dose.  Overdosage: If you think you have taken too much of this medicine contact a poison control center or emergency room at once.  NOTE: This medicine is only for you. Do not share this medicine with others.  What if I miss a dose?  If you are taking this medicine on a regular schedule and miss a dose, take it as soon as you can. If it is almost time for your next dose, take only that dose. Do not take double or extra doses.  What may interact with this medicine?  Do not take this medicine with any of the following medications:  cidofovir  ketorolac  probenecid  This medicine may also interact with the following medications:  alcohol  alendronate  bismuth subsalicylate  flavocoxid  herbal supplements like feverfew, garlic, shabbir, ginkgo biloba, horse chestnut  medicines for diabetes or glaucoma like acetazolamide, methazolamide  medicines for gout  medicines that treat or prevent blood clots like enoxaparin, heparin, ticlopidine, warfarin  other aspirin and aspirin-like medicines  NSAIDs, medicines for pain and inflammation, like ibuprofen or  naproxen  pemetrexed  sulfinpyrazone  varicella live vaccine  This list may not describe all possible interactions. Give your health care provider a list of all the medicines, herbs, non-prescription drugs, or dietary supplements you use. Also tell them if you smoke, drink alcohol, or use illegal drugs. Some items may interact with your medicine.  What should I watch for while using this medicine?  If you are treating yourself for pain, tell your doctor or health care professional if the pain lasts more than 10 days, if it gets worse, or if there is a new or different kind of pain. Tell your doctor if you see redness or swelling. Also, check with your doctor if you have a fever that lasts for more than 3 days. Only take this medicine to prevent heart attacks or blood clotting if prescribed by your doctor or health care professional.  Do not take aspirin or aspirin-like medicines with this medicine. Too much aspirin can be dangerous. Always read the labels carefully.  This medicine can irritate your stomach or cause bleeding problems. Do not smoke cigarettes or drink alcohol while taking this medicine. Do not lie down for 30 minutes after taking this medicine to prevent irritation to your throat.  If you are scheduled for any medical or dental procedure, tell your healthcare provider that you are taking this medicine. You may need to stop taking this medicine before the procedure.  This medicine may be used to treat migraines. If you take migraine medicines for 10 or more days a month, your migraines may get worse. Keep a diary of headache days and medicine use. Contact your healthcare professional if your migraine attacks occur more frequently.  What side effects may I notice from receiving this medicine?  Side effects that you should report to your doctor or health care professional as soon as possible:  allergic reactions like skin rash, itching or hives, swelling of the face, lips, or tongue  breathing  problems  changes in hearing, ringing in the ears  confusion  general ill feeling or flu-like symptoms  pain on swallowing  redness, blistering, peeling or loosening of the skin, including inside the mouth or nose  signs and symptoms of bleeding such as bloody or black, tarry stools; red or dark-brown urine; spitting up blood or brown material that looks like coffee grounds; red spots on the skin; unusual bruising or bleeding from the eye, gums, or nose  trouble passing urine or change in the amount of urine  unusually weak or tired  yellowing of the eyes or skin  Side effects that usually do not require medical attention (report to your doctor or health care professional if they continue or are bothersome):  diarrhea or constipation  headache  nausea, vomiting  stomach gas, heartburn  This list may not describe all possible side effects. Call your doctor for medical advice about side effects. You may report side effects to FDA at 7-258-FDA-4707.  Where should I keep my medicine?  Keep out of the reach of children.  Store at room temperature between 15 and 30 degrees C (59 and 86 degrees F). Protect from heat and moisture. Do not use this medicine if it has a strong vinegar smell. Throw away any unused medicine after the expiration date.  NOTE: This sheet is a summary. It may not cover all possible information. If you have questions about this medicine, talk to your doctor, pharmacist, or health care provider.  © 2020 [employer]vier/Gold Standard (2018-01-30 10:42:13)    Is patient discharged on Warfarin / Coumadin?   No

## 2022-07-07 NOTE — H&P
Hospital Medicine History & Physical Note    Date of Service  7/6/2022    Primary Care Physician  Pcp Pt States None    Consultants  Neurology (Dr. Arechiga)    Code Status  Full Code    Chief Complaint  No chief complaint on file.      History of Presenting Illness  Geoff Scott is a 67 y.o. 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  Patient was on Eliquis and atorvastatin for CVA treatment.  Patient reported being off medication for at least 2 weeks due to unable to afford medication.  He had CT head noting chronic right MCA infarct, CTA head and neck no large vessel occlusion.  Patient was subsequently transferred for neurology evaluation.  Patient was seen by me at [facility] arrival, admitted to medicine service.  I consulted neurology.    I discussed the plan of care with patient, bedside RN and neurology.    Review of Systems  Review of Systems   Constitutional: Negative for chills, fever and malaise/fatigue.   HENT: Negative for hearing loss and tinnitus.    Eyes: Negative for blurred vision and double vision.   Respiratory: Negative for cough and shortness of breath.    Cardiovascular: Negative for chest pain and palpitations.   Gastrointestinal: Negative for abdominal pain, heartburn, nausea and vomiting.   Genitourinary: Negative for dysuria and flank pain.   Musculoskeletal: Negative for falls and myalgias.   Skin: Negative for itching and rash.   Neurological: Positive for speech change. Negative for dizziness, tingling, tremors, sensory change, focal weakness, seizures, loss of consciousness, weakness and headaches.   Endo/Heme/Allergies: Negative for environmental allergies. Does not bruise/bleed easily.   Psychiatric/Behavioral: Negative for depression and substance abuse.   All other systems reviewed and are negative.      Past Medical History   has a past medical history of right BKA (HCC) and Stroke  ([facility]).    Surgical History   has no past surgical history on file.     Family History  family history is not on file.   Family history reviewed with patient. There is no family history that is pertinent to the chief complaint.     Social History   reports that he has been smoking. He has never used smokeless tobacco. He reports current drug use. Drug: Inhaled. He reports that he does not drink alcohol.    Allergies  Allergies   Allergen Reactions   • Banana Hives and Itching   • Grape, Artificial Hives and Itching     ALLERGIC TO GRAPES.       Medications  Prior to Admission Medications   Prescriptions Last Dose Informant Patient Reported? Taking?   amLODIPine (NORVASC) 5 MG Tab   No No   Sig: Take 1 Tablet by mouth every day.   apixaban (ELIQUIS) 5mg Tab 7/6/2022 at 1400  No No   Sig: Take 1 Tablet by mouth 2 times a day. Indications: Thromboembolism secondary to Atrial Fibrillation   atorvastatin (LIPITOR) 80 MG tablet 7/6/2022 at 1400  No No   Sig: Take 1 Tablet by mouth every evening.      Facility-Administered Medications: None       Physical Exam  Temp:  [36.6 °C (97.9 °F)] 36.6 °C (97.9 °F)  Pulse:  [70] 70  Resp:  [18] 18  BP: (121)/(79) 121/79  SpO2:  [92 %] 92 %                          Physical Exam  Vitals and nursing note reviewed.   Constitutional:       Appearance: Normal appearance.   HENT:      Head: Normocephalic and atraumatic.      Nose: Nose normal.      Mouth/Throat:      Mouth: Mucous membranes are moist.      Pharynx: Oropharynx is clear.   Eyes:      General: No scleral icterus.     Extraocular Movements: Extraocular movements intact.   Cardiovascular:      Rate and Rhythm: Normal rate and regular rhythm.      Pulses: Normal pulses.      Heart sounds:     No friction rub.   Pulmonary:      Effort: Pulmonary effort is normal. No respiratory distress.      Breath sounds: No stridor. No wheezing or rales.   Abdominal:      General: Bowel sounds are normal. There is no distension.       Palpations: Abdomen is soft.      Tenderness: There is no abdominal tenderness. There is no guarding or rebound.   Musculoskeletal:         General: No swelling or tenderness. Normal range of motion.      Cervical back: Neck supple. No tenderness.   Skin:     General: Skin is warm and dry.      Capillary Refill: Capillary refill takes less than 2 seconds.   Neurological:      General: No focal deficit present.      Mental Status: He is alert and oriented to person, place, and time.      Comments: No dysarthria  No pronator drift  No facial droop   Psychiatric:         Mood and Affect: Mood normal.         Laboratory:          No results for input(s): ALTSGPT, ASTSGOT, ALKPHOSPHAT, TBILIRUBIN, DBILIRUBIN, GAMMAGT, AMYLASE, LIPASE, ALB, PREALBUMIN, GLUCOSE in the last 72 hours.      No results for input(s): NTPROBNP in the last 72 hours.      No results for input(s): TROPONINT in the last 72 hours.    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

## 2022-08-15 NOTE — PROGRESS NOTES
Closing encounter no charge, no response from patient.    LVM inquiring about monitor on 8/17/2022      LVM inquiring about monitor on 8/15/2022

## 2023-11-28 ENCOUNTER — HOSPITAL ENCOUNTER (OUTPATIENT)
Dept: RADIOLOGY | Facility: MEDICAL CENTER | Age: 68
End: 2023-11-28

## 2023-11-28 ENCOUNTER — HOSPITAL ENCOUNTER (INPATIENT)
Facility: MEDICAL CENTER | Age: 68
LOS: 3 days | DRG: 522 | End: 2023-12-01
Attending: STUDENT IN AN ORGANIZED HEALTH CARE EDUCATION/TRAINING PROGRAM | Admitting: ORTHOPAEDIC SURGERY
Payer: MEDICARE

## 2023-11-28 DIAGNOSIS — R42 DIZZINESS: ICD-10-CM

## 2023-11-28 DIAGNOSIS — R11.2 NAUSEA AND VOMITING, UNSPECIFIED VOMITING TYPE: ICD-10-CM

## 2023-11-28 DIAGNOSIS — S72.001A CLOSED FRACTURE OF NECK OF RIGHT FEMUR, INITIAL ENCOUNTER (HCC): ICD-10-CM

## 2023-11-28 DIAGNOSIS — Z79.899 ON DEEP VEIN THROMBOSIS (DVT) PROPHYLAXIS: ICD-10-CM

## 2023-11-28 DIAGNOSIS — E83.39 HYPOPHOSPHATEMIA: ICD-10-CM

## 2023-11-28 PROBLEM — S72.009A FEMORAL NECK FRACTURE (HCC): Status: ACTIVE | Noted: 2023-11-28

## 2023-11-28 PROBLEM — M80.051K: Status: ACTIVE | Noted: 2023-11-28

## 2023-11-28 PROCEDURE — 700105 HCHG RX REV CODE 258: Performed by: STUDENT IN AN ORGANIZED HEALTH CARE EDUCATION/TRAINING PROGRAM

## 2023-11-28 PROCEDURE — 700111 HCHG RX REV CODE 636 W/ 250 OVERRIDE (IP): Performed by: STUDENT IN AN ORGANIZED HEALTH CARE EDUCATION/TRAINING PROGRAM

## 2023-11-28 PROCEDURE — 770001 HCHG ROOM/CARE - MED/SURG/GYN PRIV*

## 2023-11-28 PROCEDURE — 99223 1ST HOSP IP/OBS HIGH 75: CPT | Mod: AI | Performed by: STUDENT IN AN ORGANIZED HEALTH CARE EDUCATION/TRAINING PROGRAM

## 2023-11-28 PROCEDURE — G0316 PR PROLONGED IP/OBS E&M EA 15 MIN: HCPCS | Performed by: STUDENT IN AN ORGANIZED HEALTH CARE EDUCATION/TRAINING PROGRAM

## 2023-11-28 RX ORDER — ACETAMINOPHEN 325 MG/1
650 TABLET ORAL EVERY 6 HOURS PRN
Status: DISCONTINUED | OUTPATIENT
Start: 2023-11-28 | End: 2023-12-01 | Stop reason: HOSPADM

## 2023-11-28 RX ORDER — ATORVASTATIN CALCIUM 40 MG/1
80 TABLET, FILM COATED ORAL EVERY EVENING
Status: DISCONTINUED | OUTPATIENT
Start: 2023-11-29 | End: 2023-12-01 | Stop reason: HOSPADM

## 2023-11-28 RX ORDER — ASPIRIN 81 MG/1
81 TABLET, CHEWABLE ORAL DAILY
Status: DISCONTINUED | OUTPATIENT
Start: 2023-11-29 | End: 2023-11-29

## 2023-11-28 RX ORDER — SODIUM CHLORIDE 9 MG/ML
INJECTION, SOLUTION INTRAVENOUS CONTINUOUS
Status: DISCONTINUED | OUTPATIENT
Start: 2023-11-28 | End: 2023-12-01 | Stop reason: HOSPADM

## 2023-11-28 RX ORDER — HYDROMORPHONE HYDROCHLORIDE 1 MG/ML
1 INJECTION, SOLUTION INTRAMUSCULAR; INTRAVENOUS; SUBCUTANEOUS EVERY 4 HOURS PRN
Status: DISCONTINUED | OUTPATIENT
Start: 2023-11-28 | End: 2023-12-01 | Stop reason: HOSPADM

## 2023-11-28 RX ADMIN — HYDROMORPHONE HYDROCHLORIDE 1 MG: 1 INJECTION, SOLUTION INTRAMUSCULAR; INTRAVENOUS; SUBCUTANEOUS at 23:44

## 2023-11-28 RX ADMIN — SODIUM CHLORIDE: 9 INJECTION, SOLUTION INTRAVENOUS at 23:50

## 2023-11-28 ASSESSMENT — ENCOUNTER SYMPTOMS
RESPIRATORY NEGATIVE: 1
NEUROLOGICAL NEGATIVE: 1
PSYCHIATRIC NEGATIVE: 1
FALLS: 1
EYES NEGATIVE: 1
CARDIOVASCULAR NEGATIVE: 1
GASTROINTESTINAL NEGATIVE: 1

## 2023-11-28 ASSESSMENT — FIBROSIS 4 INDEX: FIB4 SCORE: 1.16

## 2023-11-28 ASSESSMENT — PAIN DESCRIPTION - PAIN TYPE: TYPE: ACUTE PAIN;CHRONIC PAIN

## 2023-11-29 ENCOUNTER — APPOINTMENT (OUTPATIENT)
Dept: RADIOLOGY | Facility: MEDICAL CENTER | Age: 68
DRG: 522 | End: 2023-11-29
Attending: ORTHOPAEDIC SURGERY
Payer: MEDICARE

## 2023-11-29 ENCOUNTER — ANESTHESIA (OUTPATIENT)
Dept: SURGERY | Facility: MEDICAL CENTER | Age: 68
DRG: 522 | End: 2023-11-29
Payer: MEDICARE

## 2023-11-29 ENCOUNTER — ANESTHESIA EVENT (OUTPATIENT)
Dept: SURGERY | Facility: MEDICAL CENTER | Age: 68
DRG: 522 | End: 2023-11-29
Payer: MEDICARE

## 2023-11-29 LAB
ABO GROUP BLD: NORMAL
ANION GAP SERPL CALC-SCNC: 9 MMOL/L (ref 7–16)
BLD GP AB SCN SERPL QL: NORMAL
BUN SERPL-MCNC: 12 MG/DL (ref 8–22)
CALCIUM SERPL-MCNC: 8.2 MG/DL (ref 8.5–10.5)
CHLORIDE SERPL-SCNC: 107 MMOL/L (ref 96–112)
CO2 SERPL-SCNC: 24 MMOL/L (ref 20–33)
CREAT SERPL-MCNC: 0.7 MG/DL (ref 0.5–1.4)
ERYTHROCYTE [DISTWIDTH] IN BLOOD BY AUTOMATED COUNT: 50.5 FL (ref 35.9–50)
GFR SERPLBLD CREATININE-BSD FMLA CKD-EPI: 101 ML/MIN/1.73 M 2
GLUCOSE SERPL-MCNC: 97 MG/DL (ref 65–99)
HCT VFR BLD AUTO: 45.3 % (ref 42–52)
HGB BLD-MCNC: 15.2 G/DL (ref 14–18)
MCH RBC QN AUTO: 32.8 PG (ref 27–33)
MCHC RBC AUTO-ENTMCNC: 33.6 G/DL (ref 32.3–36.5)
MCV RBC AUTO: 97.8 FL (ref 81.4–97.8)
PLATELET # BLD AUTO: 192 K/UL (ref 164–446)
PMV BLD AUTO: 9.1 FL (ref 9–12.9)
POTASSIUM SERPL-SCNC: 3.9 MMOL/L (ref 3.6–5.5)
RBC # BLD AUTO: 4.63 M/UL (ref 4.7–6.1)
RH BLD: NORMAL
SODIUM SERPL-SCNC: 140 MMOL/L (ref 135–145)
WBC # BLD AUTO: 10.6 K/UL (ref 4.8–10.8)

## 2023-11-29 PROCEDURE — C1776 JOINT DEVICE (IMPLANTABLE): HCPCS | Performed by: ORTHOPAEDIC SURGERY

## 2023-11-29 PROCEDURE — A9270 NON-COVERED ITEM OR SERVICE: HCPCS | Performed by: STUDENT IN AN ORGANIZED HEALTH CARE EDUCATION/TRAINING PROGRAM

## 2023-11-29 PROCEDURE — 700105 HCHG RX REV CODE 258: Performed by: STUDENT IN AN ORGANIZED HEALTH CARE EDUCATION/TRAINING PROGRAM

## 2023-11-29 PROCEDURE — 99223 1ST HOSP IP/OBS HIGH 75: CPT | Mod: 57 | Performed by: ORTHOPAEDIC SURGERY

## 2023-11-29 PROCEDURE — 86850 RBC ANTIBODY SCREEN: CPT

## 2023-11-29 PROCEDURE — 700102 HCHG RX REV CODE 250 W/ 637 OVERRIDE(OP): Performed by: ORTHOPAEDIC SURGERY

## 2023-11-29 PROCEDURE — 700111 HCHG RX REV CODE 636 W/ 250 OVERRIDE (IP): Performed by: STUDENT IN AN ORGANIZED HEALTH CARE EDUCATION/TRAINING PROGRAM

## 2023-11-29 PROCEDURE — 160009 HCHG ANES TIME/MIN: Performed by: ORTHOPAEDIC SURGERY

## 2023-11-29 PROCEDURE — 770001 HCHG ROOM/CARE - MED/SURG/GYN PRIV*

## 2023-11-29 PROCEDURE — 502000 HCHG MISC OR IMPLANTS RC 0278: Performed by: ORTHOPAEDIC SURGERY

## 2023-11-29 PROCEDURE — C1713 ANCHOR/SCREW BN/BN,TIS/BN: HCPCS | Performed by: ORTHOPAEDIC SURGERY

## 2023-11-29 PROCEDURE — A9270 NON-COVERED ITEM OR SERVICE: HCPCS | Performed by: ANESTHESIOLOGY

## 2023-11-29 PROCEDURE — 160031 HCHG SURGERY MINUTES - 1ST 30 MINS LEVEL 5: Performed by: ORTHOPAEDIC SURGERY

## 2023-11-29 PROCEDURE — 700102 HCHG RX REV CODE 250 W/ 637 OVERRIDE(OP): Performed by: ANESTHESIOLOGY

## 2023-11-29 PROCEDURE — 85027 COMPLETE CBC AUTOMATED: CPT

## 2023-11-29 PROCEDURE — A9270 NON-COVERED ITEM OR SERVICE: HCPCS | Performed by: ORTHOPAEDIC SURGERY

## 2023-11-29 PROCEDURE — 700102 HCHG RX REV CODE 250 W/ 637 OVERRIDE(OP): Performed by: STUDENT IN AN ORGANIZED HEALTH CARE EDUCATION/TRAINING PROGRAM

## 2023-11-29 PROCEDURE — 99232 SBSQ HOSP IP/OBS MODERATE 35: CPT | Performed by: STUDENT IN AN ORGANIZED HEALTH CARE EDUCATION/TRAINING PROGRAM

## 2023-11-29 PROCEDURE — 700111 HCHG RX REV CODE 636 W/ 250 OVERRIDE (IP): Performed by: ORTHOPAEDIC SURGERY

## 2023-11-29 PROCEDURE — 700105 HCHG RX REV CODE 258: Performed by: ORTHOPAEDIC SURGERY

## 2023-11-29 PROCEDURE — 160048 HCHG OR STATISTICAL LEVEL 1-5: Performed by: ORTHOPAEDIC SURGERY

## 2023-11-29 PROCEDURE — 27130 TOTAL HIP ARTHROPLASTY: CPT | Mod: ASROC,RT | Performed by: STUDENT IN AN ORGANIZED HEALTH CARE EDUCATION/TRAINING PROGRAM

## 2023-11-29 PROCEDURE — 99222 1ST HOSP IP/OBS MODERATE 55: CPT | Mod: 57 | Performed by: ORTHOPAEDIC SURGERY

## 2023-11-29 PROCEDURE — 0SR906A REPLACEMENT OF RIGHT HIP JOINT WITH OXIDIZED ZIRCONIUM ON POLYETHYLENE SYNTHETIC SUBSTITUTE, UNCEMENTED, OPEN APPROACH: ICD-10-PCS | Performed by: ORTHOPAEDIC SURGERY

## 2023-11-29 PROCEDURE — 700101 HCHG RX REV CODE 250: Performed by: ANESTHESIOLOGY

## 2023-11-29 PROCEDURE — 36415 COLL VENOUS BLD VENIPUNCTURE: CPT

## 2023-11-29 PROCEDURE — 86901 BLOOD TYPING SEROLOGIC RH(D): CPT

## 2023-11-29 PROCEDURE — 700105 HCHG RX REV CODE 258: Performed by: ANESTHESIOLOGY

## 2023-11-29 PROCEDURE — 160042 HCHG SURGERY MINUTES - EA ADDL 1 MIN LEVEL 5: Performed by: ORTHOPAEDIC SURGERY

## 2023-11-29 PROCEDURE — 86900 BLOOD TYPING SEROLOGIC ABO: CPT

## 2023-11-29 PROCEDURE — 700101 HCHG RX REV CODE 250: Performed by: ORTHOPAEDIC SURGERY

## 2023-11-29 PROCEDURE — 160002 HCHG RECOVERY MINUTES (STAT): Performed by: ORTHOPAEDIC SURGERY

## 2023-11-29 PROCEDURE — 160036 HCHG PACU - EA ADDL 30 MINS PHASE I: Performed by: ORTHOPAEDIC SURGERY

## 2023-11-29 PROCEDURE — 72170 X-RAY EXAM OF PELVIS: CPT

## 2023-11-29 PROCEDURE — 27130 TOTAL HIP ARTHROPLASTY: CPT | Mod: RT | Performed by: ORTHOPAEDIC SURGERY

## 2023-11-29 PROCEDURE — 80048 BASIC METABOLIC PNL TOTAL CA: CPT

## 2023-11-29 PROCEDURE — 700111 HCHG RX REV CODE 636 W/ 250 OVERRIDE (IP): Performed by: ANESTHESIOLOGY

## 2023-11-29 PROCEDURE — 160035 HCHG PACU - 1ST 60 MINS PHASE I: Performed by: ORTHOPAEDIC SURGERY

## 2023-11-29 DEVICE — IMPLANTABLE DEVICE: Type: IMPLANTABLE DEVICE | Site: HIP | Status: FUNCTIONAL

## 2023-11-29 DEVICE — IMPLANT OXINIUM FEM HD 12/14 28MM +0 (1EA): Type: IMPLANTABLE DEVICE | Site: HIP | Status: FUNCTIONAL

## 2023-11-29 DEVICE — IMPLANT REF SPHER HEAD SCREW 40MM (1EA): Type: IMPLANTABLE DEVICE | Site: HIP | Status: FUNCTIONAL

## 2023-11-29 DEVICE — IMPLANT R3 3 HOLE ACET SHELL 54MM (1EA): Type: IMPLANTABLE DEVICE | Site: HIP | Status: FUNCTIONAL

## 2023-11-29 RX ORDER — OXYCODONE HCL 5 MG/5 ML
10 SOLUTION, ORAL ORAL
Status: COMPLETED | OUTPATIENT
Start: 2023-11-29 | End: 2023-11-29

## 2023-11-29 RX ORDER — LIDOCAINE HYDROCHLORIDE 40 MG/ML
SOLUTION TOPICAL PRN
Status: DISCONTINUED | OUTPATIENT
Start: 2023-11-29 | End: 2023-11-29 | Stop reason: SURG

## 2023-11-29 RX ORDER — ASPIRIN 81 MG/1
81 TABLET, CHEWABLE ORAL 2 TIMES DAILY
Status: DISCONTINUED | OUTPATIENT
Start: 2023-11-29 | End: 2023-12-01 | Stop reason: HOSPADM

## 2023-11-29 RX ORDER — OXYCODONE HCL 5 MG/5 ML
5 SOLUTION, ORAL ORAL
Status: COMPLETED | OUTPATIENT
Start: 2023-11-29 | End: 2023-11-29

## 2023-11-29 RX ORDER — HYDROMORPHONE HYDROCHLORIDE 2 MG/ML
INJECTION, SOLUTION INTRAMUSCULAR; INTRAVENOUS; SUBCUTANEOUS PRN
Status: DISCONTINUED | OUTPATIENT
Start: 2023-11-29 | End: 2023-11-29 | Stop reason: SURG

## 2023-11-29 RX ORDER — HYDROMORPHONE HYDROCHLORIDE 1 MG/ML
0.2 INJECTION, SOLUTION INTRAMUSCULAR; INTRAVENOUS; SUBCUTANEOUS
Status: DISCONTINUED | OUTPATIENT
Start: 2023-11-29 | End: 2023-11-29 | Stop reason: HOSPADM

## 2023-11-29 RX ORDER — ROCURONIUM BROMIDE 10 MG/ML
INJECTION, SOLUTION INTRAVENOUS PRN
Status: DISCONTINUED | OUTPATIENT
Start: 2023-11-29 | End: 2023-11-29 | Stop reason: SURG

## 2023-11-29 RX ORDER — DEXAMETHASONE SODIUM PHOSPHATE 4 MG/ML
INJECTION, SOLUTION INTRA-ARTICULAR; INTRALESIONAL; INTRAMUSCULAR; INTRAVENOUS; SOFT TISSUE PRN
Status: DISCONTINUED | OUTPATIENT
Start: 2023-11-29 | End: 2023-11-29 | Stop reason: SURG

## 2023-11-29 RX ORDER — HYDROMORPHONE HYDROCHLORIDE 1 MG/ML
0.1 INJECTION, SOLUTION INTRAMUSCULAR; INTRAVENOUS; SUBCUTANEOUS
Status: DISCONTINUED | OUTPATIENT
Start: 2023-11-29 | End: 2023-11-29 | Stop reason: HOSPADM

## 2023-11-29 RX ORDER — HYDRALAZINE HYDROCHLORIDE 20 MG/ML
5 INJECTION INTRAMUSCULAR; INTRAVENOUS
Status: DISCONTINUED | OUTPATIENT
Start: 2023-11-29 | End: 2023-11-29 | Stop reason: HOSPADM

## 2023-11-29 RX ORDER — DIPHENHYDRAMINE HYDROCHLORIDE 50 MG/ML
6.25 INJECTION INTRAMUSCULAR; INTRAVENOUS
Status: DISCONTINUED | OUTPATIENT
Start: 2023-11-29 | End: 2023-11-29 | Stop reason: HOSPADM

## 2023-11-29 RX ORDER — EPHEDRINE SULFATE 50 MG/ML
10 INJECTION, SOLUTION INTRAVENOUS
Status: DISCONTINUED | OUTPATIENT
Start: 2023-11-29 | End: 2023-11-29 | Stop reason: HOSPADM

## 2023-11-29 RX ORDER — MEPERIDINE HYDROCHLORIDE 25 MG/ML
12.5 INJECTION INTRAMUSCULAR; INTRAVENOUS; SUBCUTANEOUS
Status: DISCONTINUED | OUTPATIENT
Start: 2023-11-29 | End: 2023-11-29 | Stop reason: HOSPADM

## 2023-11-29 RX ORDER — VANCOMYCIN HYDROCHLORIDE 1 G/20ML
INJECTION, POWDER, LYOPHILIZED, FOR SOLUTION INTRAVENOUS
Status: COMPLETED | OUTPATIENT
Start: 2023-11-29 | End: 2023-11-29

## 2023-11-29 RX ORDER — TRANEXAMIC ACID 100 MG/ML
INJECTION, SOLUTION INTRAVENOUS PRN
Status: DISCONTINUED | OUTPATIENT
Start: 2023-11-29 | End: 2023-11-29 | Stop reason: SURG

## 2023-11-29 RX ORDER — CEFAZOLIN SODIUM 1 G/3ML
INJECTION, POWDER, FOR SOLUTION INTRAMUSCULAR; INTRAVENOUS PRN
Status: DISCONTINUED | OUTPATIENT
Start: 2023-11-29 | End: 2023-11-29 | Stop reason: SURG

## 2023-11-29 RX ORDER — LIDOCAINE HYDROCHLORIDE 20 MG/ML
INJECTION, SOLUTION EPIDURAL; INFILTRATION; INTRACAUDAL; PERINEURAL PRN
Status: DISCONTINUED | OUTPATIENT
Start: 2023-11-29 | End: 2023-11-29 | Stop reason: SURG

## 2023-11-29 RX ORDER — ONDANSETRON 2 MG/ML
4 INJECTION INTRAMUSCULAR; INTRAVENOUS
Status: DISCONTINUED | OUTPATIENT
Start: 2023-11-29 | End: 2023-11-29 | Stop reason: HOSPADM

## 2023-11-29 RX ORDER — SODIUM CHLORIDE 9 MG/ML
INJECTION, SOLUTION INTRAMUSCULAR; INTRAVENOUS; SUBCUTANEOUS
Status: DISCONTINUED | OUTPATIENT
Start: 2023-11-29 | End: 2023-11-29 | Stop reason: HOSPADM

## 2023-11-29 RX ORDER — ROPIVACAINE HYDROCHLORIDE 5 MG/ML
INJECTION, SOLUTION EPIDURAL; INFILTRATION; PERINEURAL
Status: DISCONTINUED | OUTPATIENT
Start: 2023-11-29 | End: 2023-11-29 | Stop reason: HOSPADM

## 2023-11-29 RX ORDER — ONDANSETRON 2 MG/ML
INJECTION INTRAMUSCULAR; INTRAVENOUS PRN
Status: DISCONTINUED | OUTPATIENT
Start: 2023-11-29 | End: 2023-11-29 | Stop reason: SURG

## 2023-11-29 RX ORDER — ACETAMINOPHEN 500 MG
1000 TABLET ORAL EVERY 6 HOURS PRN
Status: DISCONTINUED | OUTPATIENT
Start: 2023-11-29 | End: 2023-11-29 | Stop reason: HOSPADM

## 2023-11-29 RX ORDER — HALOPERIDOL 5 MG/ML
1 INJECTION INTRAMUSCULAR
Status: DISCONTINUED | OUTPATIENT
Start: 2023-11-29 | End: 2023-11-29 | Stop reason: HOSPADM

## 2023-11-29 RX ORDER — EPINEPHRINE 1 MG/ML(1)
AMPUL (ML) INJECTION
Status: DISCONTINUED | OUTPATIENT
Start: 2023-11-29 | End: 2023-11-29 | Stop reason: HOSPADM

## 2023-11-29 RX ORDER — SODIUM CHLORIDE, SODIUM LACTATE, POTASSIUM CHLORIDE, CALCIUM CHLORIDE 600; 310; 30; 20 MG/100ML; MG/100ML; MG/100ML; MG/100ML
INJECTION, SOLUTION INTRAVENOUS
Status: DISCONTINUED | OUTPATIENT
Start: 2023-11-29 | End: 2023-11-29 | Stop reason: SURG

## 2023-11-29 RX ORDER — MAGNESIUM SULFATE HEPTAHYDRATE 40 MG/ML
INJECTION, SOLUTION INTRAVENOUS PRN
Status: DISCONTINUED | OUTPATIENT
Start: 2023-11-29 | End: 2023-11-29 | Stop reason: SURG

## 2023-11-29 RX ORDER — MAGNESIUM HYDROXIDE 1200 MG/15ML
LIQUID ORAL
Status: COMPLETED | OUTPATIENT
Start: 2023-11-29 | End: 2023-11-29

## 2023-11-29 RX ORDER — ONDANSETRON 4 MG/1
4 TABLET, ORALLY DISINTEGRATING ORAL EVERY 4 HOURS PRN
Status: DISCONTINUED | OUTPATIENT
Start: 2023-11-29 | End: 2023-12-01 | Stop reason: HOSPADM

## 2023-11-29 RX ORDER — HYDROMORPHONE HYDROCHLORIDE 1 MG/ML
0.4 INJECTION, SOLUTION INTRAMUSCULAR; INTRAVENOUS; SUBCUTANEOUS
Status: DISCONTINUED | OUTPATIENT
Start: 2023-11-29 | End: 2023-11-29 | Stop reason: HOSPADM

## 2023-11-29 RX ORDER — KETOROLAC TROMETHAMINE 30 MG/ML
INJECTION, SOLUTION INTRAMUSCULAR; INTRAVENOUS
Status: DISCONTINUED | OUTPATIENT
Start: 2023-11-29 | End: 2023-11-29 | Stop reason: HOSPADM

## 2023-11-29 RX ORDER — SODIUM CHLORIDE, SODIUM LACTATE, POTASSIUM CHLORIDE, CALCIUM CHLORIDE 600; 310; 30; 20 MG/100ML; MG/100ML; MG/100ML; MG/100ML
INJECTION, SOLUTION INTRAVENOUS CONTINUOUS
Status: DISCONTINUED | OUTPATIENT
Start: 2023-11-29 | End: 2023-11-29 | Stop reason: HOSPADM

## 2023-11-29 RX ADMIN — LIDOCAINE HYDROCHLORIDE 4 ML: 40 SOLUTION TOPICAL at 16:18

## 2023-11-29 RX ADMIN — HYDROMORPHONE HYDROCHLORIDE 1 MG: 1 INJECTION, SOLUTION INTRAMUSCULAR; INTRAVENOUS; SUBCUTANEOUS at 13:19

## 2023-11-29 RX ADMIN — SODIUM CHLORIDE: 9 INJECTION, SOLUTION INTRAVENOUS at 10:21

## 2023-11-29 RX ADMIN — DEXAMETHASONE SODIUM PHOSPHATE 8 MG: 4 INJECTION INTRA-ARTICULAR; INTRALESIONAL; INTRAMUSCULAR; INTRAVENOUS; SOFT TISSUE at 16:21

## 2023-11-29 RX ADMIN — OXYCODONE HYDROCHLORIDE 5 MG: 5 SOLUTION ORAL at 18:30

## 2023-11-29 RX ADMIN — TRANEXAMIC ACID 1000 MG: 100 INJECTION, SOLUTION INTRAVENOUS at 16:21

## 2023-11-29 RX ADMIN — HYDROMORPHONE HYDROCHLORIDE 1 MG: 1 INJECTION, SOLUTION INTRAMUSCULAR; INTRAVENOUS; SUBCUTANEOUS at 06:10

## 2023-11-29 RX ADMIN — ROCURONIUM BROMIDE 60 MG: 50 INJECTION, SOLUTION INTRAVENOUS at 16:18

## 2023-11-29 RX ADMIN — ASPIRIN 81 MG: 81 TABLET, CHEWABLE ORAL at 05:50

## 2023-11-29 RX ADMIN — SODIUM CHLORIDE, POTASSIUM CHLORIDE, SODIUM LACTATE AND CALCIUM CHLORIDE: 600; 310; 30; 20 INJECTION, SOLUTION INTRAVENOUS at 16:13

## 2023-11-29 RX ADMIN — HYDROMORPHONE HYDROCHLORIDE 0.5 MG: 2 INJECTION INTRAMUSCULAR; INTRAVENOUS; SUBCUTANEOUS at 16:40

## 2023-11-29 RX ADMIN — HYDROMORPHONE HYDROCHLORIDE 0.5 MG: 2 INJECTION INTRAMUSCULAR; INTRAVENOUS; SUBCUTANEOUS at 16:18

## 2023-11-29 RX ADMIN — TRANEXAMIC ACID 1000 MG: 100 INJECTION, SOLUTION INTRAVENOUS at 16:52

## 2023-11-29 RX ADMIN — PROPOFOL 140 MG: 10 INJECTION, EMULSION INTRAVENOUS at 16:18

## 2023-11-29 RX ADMIN — CEFAZOLIN 2 G: 2 INJECTION, POWDER, FOR SOLUTION INTRAMUSCULAR; INTRAVENOUS at 22:18

## 2023-11-29 RX ADMIN — ASPIRIN 81 MG: 81 TABLET, CHEWABLE ORAL at 19:58

## 2023-11-29 RX ADMIN — LIDOCAINE HYDROCHLORIDE 60 MG: 20 INJECTION, SOLUTION EPIDURAL; INFILTRATION; INTRACAUDAL at 16:18

## 2023-11-29 RX ADMIN — CEFAZOLIN 2 G: 1 INJECTION, POWDER, FOR SOLUTION INTRAMUSCULAR; INTRAVENOUS at 16:18

## 2023-11-29 RX ADMIN — MAGNESIUM SULFATE HEPTAHYDRATE 2 G: 2 INJECTION, SOLUTION INTRAVENOUS at 16:21

## 2023-11-29 RX ADMIN — ONDANSETRON 4 MG: 2 INJECTION INTRAMUSCULAR; INTRAVENOUS at 16:52

## 2023-11-29 ASSESSMENT — COGNITIVE AND FUNCTIONAL STATUS - GENERAL
DAILY ACTIVITIY SCORE: 24
SUGGESTED CMS G CODE MODIFIER DAILY ACTIVITY: CH
MOBILITY SCORE: 24
SUGGESTED CMS G CODE MODIFIER MOBILITY: CH

## 2023-11-29 ASSESSMENT — LIFESTYLE VARIABLES
ALCOHOL_USE: YES
HAVE PEOPLE ANNOYED YOU BY CRITICIZING YOUR DRINKING: NO
CONSUMPTION TOTAL: NEGATIVE
HOW MANY TIMES IN THE PAST YEAR HAVE YOU HAD 5 OR MORE DRINKS IN A DAY: 0
HAVE YOU EVER FELT YOU SHOULD CUT DOWN ON YOUR DRINKING: NO
TOTAL SCORE: 0
TOTAL SCORE: 0
DOES PATIENT WANT TO STOP DRINKING: NO
AVERAGE NUMBER OF DAYS PER WEEK YOU HAVE A DRINK CONTAINING ALCOHOL: 1
EVER FELT BAD OR GUILTY ABOUT YOUR DRINKING: NO
ON A TYPICAL DAY WHEN YOU DRINK ALCOHOL HOW MANY DRINKS DO YOU HAVE: 0
TOTAL SCORE: 0
EVER HAD A DRINK FIRST THING IN THE MORNING TO STEADY YOUR NERVES TO GET RID OF A HANGOVER: NO

## 2023-11-29 ASSESSMENT — PAIN DESCRIPTION - PAIN TYPE
TYPE: SURGICAL PAIN
TYPE: ACUTE PAIN
TYPE: SURGICAL PAIN
TYPE: SURGICAL PAIN

## 2023-11-29 ASSESSMENT — FIBROSIS 4 INDEX: FIB4 SCORE: 1.53

## 2023-11-29 NOTE — PROGRESS NOTES
Med rec is complete per Patient at bedside.       Allergies reviewed.    Has patient had any outside antibiotics in the last 30 days? N    Any Anticoagulants (rivaroxaban, apixaban, edoxaban, dabigatran, warfarin, enoxaparin) taken in the last 14 days? N           Pharmacy/Pharmacies Pt utilizes : [facility] [phone]

## 2023-11-29 NOTE — DISCHARGE PLANNING
RN CM met with patient at bedside to complete admission assessment. Patient A&Ox4 and able to verify information on face sheet.   Patient lives in single story home with his wife, Cookie, in [facility].   Patient's address changed to [address] [place] [address], [address] [place] [address].   Prior to this admission patient was independent with ADLs. 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 They will be able to transport him home upon discharge if he does not require post-acute placement.   Patient has a cane at home but no other DME. He does not require oxygen at baseline.   Patient does not have a PCP.   Patient reports he receives half-way and his wife SSI. He does not report any financial concerns.   He denies SA or MH concerns. Pending PT/OT recommendations.   SEBAS REYES will continue to follow patient's case for potential discharge needs.     Case Management Discharge Planning    Admission Date: 11/28/2023  GMLOS: 2.8  ALOS: 1    6-Clicks ADL Score:    6-Clicks Mobility Score:        Anticipated Discharge Dispo: Discharge Disposition: D/T to home under [facility] care in anticipation of covered skilled care (06)  Discharge Address: [address] [place] [address]  Discharge Contact Phone Number: [phone]    DME Needed: No    Action(s) Taken: Updated Provider/Nurse on Discharge Plan and Patient Conference    Escalations Completed: None    Medically Clear: No    Next Steps: Future time with therapy    Barriers to Discharge: Medical clearance, Pending PT Evaluation, and Pending Procedures    Is the patient up for discharge tomorrow: No    Care Transition Team Assessment    Information Source  Orientation Level: Oriented X4  Information Given By: Patient  Informant's Name: Geoff  Who is responsible for making decisions for patient? : Patient    Readmission Evaluation  Is this a readmission?: No    Elopement Risk  Legal Hold: No    Interdisciplinary Discharge Planning  Does Admitting Nurse  Feel This Could be a Complex Discharge?: No  Primary Care Physician: None  Lives with - Patient's Self Care Capacity: Spouse  Patient or legal guardian wants to designate a caregiver: No  Support Systems: Children, Spouse / Significant Other  Housing / Facility: 1 Story House  Name of Care Facility: NA  Do You Take your Prescribed Medications Regularly: Yes  Able to Return to Previous ADL's: Future Time w/Therapy  Mobility Issues: No  Prior Services: None  Patient Prefers to be Discharged to:: Home  Assistance Needed: Unknown at this Time  Durable Medical Equipment: Not Applicable  DME Provider / Phone: NA    Discharge Preparedness  What is your plan after discharge?: Home with help  Prior Functional Level: Ambulatory, Independent with Activities of Daily Living  Difficulity with ADLs: None  Difficulity with IADLs: Driving  Difficulity with IADL Comments: Patient has peripheral vision loss and doesn't drive. Wife and grandson drive patient    Functional Assesment  Prior Functional Level: Ambulatory, Independent with Activities of Daily Living    Finances  Financial Barriers to Discharge: No  Prescription Coverage: Yes    Vision / Hearing Impairment  Vision Impairment : Yes  Hearing Impairment : No    Values / Beliefs / Concerns  Values / Beliefs Concerns : No    Advance Directive  Advance Directive?: None  Advance Directive offered?: AD Booklet refused    Domestic Abuse  Have you ever been the victim of abuse or violence?: No  Physical Abuse or Sexual Abuse: No  Verbal Abuse or Emotional Abuse: No  Possible Abuse/Neglect Reported to:: Not Applicable    Psychological Assessment  History of Substance Abuse: None  History of Psychiatric Problems: No  Non-compliant with Treatment: No  Newly Diagnosed Illness: Yes    Discharge Risks or Barriers  Discharge risks or barriers?: No PCP  Patient risk factors: No PCP    Anticipated Discharge Information  Discharge Disposition: D/T to home under [facility] care in anticipation of  covered skilled care (06)  Discharge Address: [address] [place]  Discharge Contact Phone Number: [phone]

## 2023-11-29 NOTE — PROGRESS NOTES
4 Eyes Skin Assessment Completed by Lito RN and Tiffany RN.    Head WDL  Ears WDL  Nose WDL  Mouth WDL  Neck WDL  Breast/Chest WDL  Shoulder Blades WDL  Spine Redness and Non-Blanching  (R) Arm/Elbow/Hand Redness, Abrasion, and Discoloration  (L) Arm/Elbow/Hand WDL  Abdomen WDL  Groin WDL  Scrotum/Coccyx/Buttocks WDL  (R) Leg Scab, BKA with prosthesis  (L) Leg WDL  (R) Heel/Foot/Toe Scab, BKA with prosthesis  (L) Heel/Foot/Toe WDL          Devices In Places Blood Pressure Cuff, Pulse Ox, and SCD's      Interventions In Place NC W/Ear Foams, Heel Mepilex, Heel Float Boots, Waffle Overlay, Pillows, Elbow Mepilex, Q2 Turns, Heels Loaded W/Pillows, and Pressure Redistribution Mattress    Possible Skin Injury Yes    Pictures Uploaded Into Epic Yes  Wound Consult Placed N/A  RN Wound Prevention Protocol Ordered No

## 2023-11-29 NOTE — CARE PLAN
Problem: Knowledge Deficit - Standard  Goal: Patient and family/care givers will demonstrate understanding of plan of care, disease process/condition, diagnostic tests and medications  Outcome: Progressing     Problem: Pain - Standard  Goal: Alleviation of pain or a reduction in pain to the patient’s comfort goal  Outcome: Progressing   The patient is Stable - Low risk of patient condition declining or worsening    Shift Goals  Clinical Goals: NPO, Pain management  Patient Goals: Pain control  Family Goals: Not present    Progress made toward(s) clinical / shift goals: Still NPO, tag placed at the door. Verbalized understanding towards the side effects of the medication provided and NPO. Pain relief/ reduction of pain.

## 2023-11-29 NOTE — PROGRESS NOTES
Direct Admit:     Facility: [facility]  Physician: Oseas  CC: sp fall and right hip pain    Patient is a 68-year-old male with past medical history of CVA with residual deficits, hyperlipidemia, right traumatic BKA wearing a prosthesis disease, now status post fall.  He presented to an outside facility now showing a closed right-sided hip fracture.  Patient will need transfer for orthopedic surgery.    Consult orthopedic surgery upon arrival.  Keep n.p.o.

## 2023-11-29 NOTE — PROGRESS NOTES
1011 Called ER spoke with Renae, informed patient already here in the floor. They will send in tube [phone].

## 2023-11-29 NOTE — H&P
Hospital Medicine History & Physical Note    Date of Service  11/28/2023    Primary Care Physician  Pcp Pt States None    Consultants  Orthopedic Surgery    Code Status  Full Code    Chief Complaint  Femoral neck fracture    History of Presenting Illness  Geoff Scott is a 68 y.o. male who presented 11/28/2023 with right hip pain.    Patient has history of embolic infarcts on aspirin and Lipitor with residual hemianopsia, right BKA who presented to outside facility ER after having a mechanical fall.  He denies head strike/loss of consciousness.  Noted immediate right hip pain after.  Presented to outside facility ER:    Labs at outside facility ER:  White blood cell 14.6 hemoglobin 15.8 platelet 209  Sodium 140 potassium 3.8 chloride 108 bicarb 25 alkaline phosphatase 119 glucose 104 creatinine 0.7 BUN/creatinine ratio 22.9 troponin negative    CT pelvis showing impacted comminuted right femoral neck fracture, mild right hip joint space narrowing.    Patient was transferred to [facility] for orthopedic surgery consult.    I discussed the plan of care with patient.    Review of Systems  Review of Systems   Constitutional:  Positive for malaise/fatigue.   HENT: Negative.     Eyes: Negative.    Respiratory: Negative.     Cardiovascular: Negative.    Gastrointestinal: Negative.    Genitourinary: Negative.    Musculoskeletal:  Positive for falls and joint pain.   Skin: Negative.    Neurological: Negative.    Endo/Heme/Allergies: Negative.    Psychiatric/Behavioral: Negative.         Past Medical History   has a past medical history of right BKA (HCC) and Stroke (HCC).    Surgical History   has no past surgical history on file.     Family History  family history is not on file.   Family history reviewed with patient. There is no family history that is pertinent to the chief complaint.     Social History   reports that he has been smoking. He has never used smokeless tobacco. He reports current drug use. Drug: Inhaled.  "He reports that he does not drink alcohol.    Allergies  Allergies   Allergen Reactions    Banana Hives and Itching    Grape, Artificial Hives and Itching     ALLERGIC TO GRAPES.       Medications  Prior to Admission Medications   Prescriptions Last Dose Informant Patient Reported? Taking?   amLODIPine (NORVASC) 5 MG Tab   No No   Sig: Take 1 Tablet by mouth every day.   aspirin (ASA) 81 MG Chew Tab chewable tablet   No No   Sig: Chew 1 Tablet every day.   atorvastatin (LIPITOR) 80 MG tablet   No No   Sig: Take 1 Tablet by mouth every evening.      Facility-Administered Medications: None       Physical Exam                             Physical Exam  Constitutional:       Appearance: Normal appearance. He is normal weight.   HENT:      Head: Normocephalic.      Nose: Nose normal.      Mouth/Throat:      Mouth: Mucous membranes are moist.   Eyes:      Pupils: Pupils are equal, round, and reactive to light.   Cardiovascular:      Rate and Rhythm: Normal rate and regular rhythm.      Pulses: Normal pulses.   Pulmonary:      Effort: Pulmonary effort is normal.      Breath sounds: Normal breath sounds.   Abdominal:      General: Abdomen is flat. Bowel sounds are normal.      Palpations: Abdomen is soft.   Musculoskeletal:      Cervical back: Neck supple.      Comments: R hip mobility due to pain  R BKA, prosthesis in place   Skin:     General: Skin is warm.   Neurological:      Mental Status: He is alert and oriented to person, place, and time. Mental status is at baseline.   Psychiatric:         Mood and Affect: Mood normal.         Behavior: Behavior normal.         Thought Content: Thought content normal.         Judgment: Judgment normal.         Laboratory:          No results for input(s): \"ALTSGPT\", \"ASTSGOT\", \"ALKPHOSPHAT\", \"TBILIRUBIN\", \"DBILIRUBIN\", \"GAMMAGT\", \"AMYLASE\", \"LIPASE\", \"ALB\", \"PREALBUMIN\", \"GLUCOSE\" in the last 72 hours.      No results for input(s): \"NTPROBNP\" in the last 72 hours.      No results " "for input(s): \"TROPONINT\" in the last 72 hours.    Imaging:  No orders to display       no X-Ray or EKG requiring interpretation    Assessment/Plan:  Justification for Admission Status  I anticipate this patient will require at least two midnights for appropriate medical management, necessitating inpatient admission because pt has right femoral neck fracture    Patient will need a Med/Surg bed on ORTHOPEDICS service .  The need is secondary to femoral neck fracture.    Femoral neck fracture (HCC)  Assessment & Plan  Suffered mechanical fall  CAT scan hip showing right-sided femoral neck fracture, comminuted  Patient will be treated overnight with fluids, Dilaudid as needed.  Monitor for respiratory depression from Dilaudid.  PT OT  Orthopedic surgery consulted, OR in a.m.    H/O: CVA (cerebrovascular accident)- (present on admission)  Assessment & Plan  Hx embolic infarct  Continue ASA and lipitor      Total time spent on visit 90 minutes reviewing records from outside facility, discussing with consultants, discussing diagnosis treatment plan prognosis plan of care risk and benefits with patient.    VTE prophylaxis: pharmacologic prophylaxis contraindicated due to OR in AM   "

## 2023-11-29 NOTE — PROGRESS NOTES
2215 Informed RTOC for the admitting physician on duty.     2248 Hospitalist came in seen the patient at bed side with new order made.

## 2023-11-29 NOTE — ASSESSMENT & PLAN NOTE
Secondary to mechanical fall  CAT scan hip showing right-sided femoral neck fracture, comminuted    Orthopedics consulted  S/p Right total hip arthroplasty 11/29  Vitamin D pending

## 2023-11-29 NOTE — ANESTHESIA PREPROCEDURE EVALUATION
Case: [id number] Date/Time: 11/29/23 1545    Procedure: ARTHROPLASTY, HIP, TOTAL, ANTERIOR APPROACH (Right)    Location: [facility] OR 16 / SURGERY [facility]    Surgeons: Efrem Perez M.D.            Relevant Problems   NEURO   (positive) TIA (transient ischemic attack)      CARDIAC   (positive) HTN (hypertension)      ENDO   (positive) Type 2 diabetes mellitus with neurologic complication, without long-term current use of insulin (HCC)      Other   (positive) Femoral neck fracture (HCC)   (positive) H/O: CVA (cerebrovascular accident)       Physical Exam    Airway   Mallampati: II  TM distance: >3 FB  Neck ROM: full       Cardiovascular - normal exam  Rhythm: regular  Rate: normal  (-) murmur     Dental - normal exam           Pulmonary - normal exam  Breath sounds clear to auscultation     Abdominal    Neurological - normal exam                   Anesthesia Plan    ASA 3   ASA physical status 3 criteria: CVA or TIA - history (> 3 months)    Plan - general       Airway plan will be ETT          Induction: intravenous    Postoperative Plan: Postoperative administration of opioids is intended.    Pertinent diagnostic labs and testing reviewed    Informed Consent:    Anesthetic plan and risks discussed with patient.    Use of blood products discussed with: patient whom consented to blood products.

## 2023-11-29 NOTE — CONSULTS
Date of Service: 11/29/23    Geoff Scott was seen today in consultation for right femoral neck fracture at the request of Dr. Aguilar    CC: Right hip fracture    HPI: Geoff Scott is a 68 y.o. male who presents with complaints of pain to right hip and groin.  This started yesterday after a ground-level fall.  He was unable to stand or weight-bear on the right side.  He does have a history of a below-knee amputation on the right side that was performed when he was 10 years old.  He has not had any recent issues with the stump and has had a well fitting prosthetic leg.  He does not usually use assistive devices to ambulate.  The pain is 6/10 and is described as sharp.  The pain is made worse by palpation of the area and made better by rest and immobilization.    PMH:   Past Medical History:   Diagnosis Date    Hx of right BKA (HCC)     since age of 10    Stroke (HCC)     CVA in 2005, no remaining deficits       PSH: No past surgical history on file.    FH: No family history on file.    SH:   Social History     Socioeconomic History    Marital status:      Spouse name: Not on file    Number of children: Not on file    Years of education: Not on file    Highest education level: Not on file   Occupational History    Not on file   Tobacco Use    Smoking status: Some Days    Smokeless tobacco: Never   Substance and Sexual Activity    Alcohol use: No    Drug use: Yes     Types: Inhaled     Comment: cannabis    Sexual activity: Not on file   Other Topics Concern    Not on file   Social History Narrative    Not on file     Social Determinants of Health     Financial Resource Strain: Not on file   Food Insecurity: Not on file   Transportation Needs: Not on file   Physical Activity: Not on file   Stress: Not on file   Social Connections: Not on file   Intimate Partner Violence: Not on file   Housing Stability: Not on file       ROS: In review of the following systems: Constitutional, Eyes, ENT,  Cardiovascular,Respiratory, GI, , Musculoskeletal, Skin, Neuro, Psych, Hematologic, Endocrine, Allergic; no pertinent findings were found related to the chief complaint and orthopedic injury     /70   Pulse (!) 58   Temp 36.8 °C (98.2 °F) (Oral)   Resp 15   Wt 67.1 kg (148 lb)   SpO2 97%     Physical Exam:  General: Well nourished, well developed, age appropriate appearance   HEENT: Normocephalic, atraumatic  Psych: Normal mood and affect  Neck: Supple, no pain to motion  Chest/Pulmonary: breathing unlabored, no audible wheezing  Cardio: Regular heart rate and rhythm  Neuro: Sensation grossly intact to BUE and BLE, moving all four extremities  Skin: Intact with no full thickness abrasions or lacerations  MSK: Right hip was not ranged due to the known injury.  No tenderness nor effusion is present at the knee.  Below-knee amputation is present without wound issues.  2 of 3 extremities are atraumatic and nontender    Imaging and labs: CT scan of the pelvis from an outside facility taken yesterday was independent reviewed and interpreted by myself.  This shows a valgus impacted femoral neck fracture with apex anterior angulation present.  Decreased joint space consistent with some early degenerative changes    Recent Labs     11/29/23  0436   WBC 10.6   RBC 4.63*   HEMOGLOBIN 15.2   HEMATOCRIT 45.3   MCV 97.8   MCH 32.8   RDW 50.5*   PLATELETCT 192   MPV 9.1       Assessment: Right femoral neck fracture    I discussed the diagnosis and findings with the patient at length.  I reviewed possible non operative and operative interventions and the risks and benefits of each of these.  I recommended right hip arthroplasty given the displaced nature of his femoral neck fracture.  I would recommend urgent management to decrease risk of morbidity mortality would come from delayed treatment.  He had a chance to ask questions and all of these were answered to his satisfaction.        Plan:  N.p.o.  Right hip  arthroplasty today  Mobilize postoperatively  PT/OT  Discharge planning

## 2023-11-29 NOTE — THERAPY
Physical Therapy Contact Note    Patient Name: Geoff Scott  Age:  68 y.o., Sex:  male  Medical Record #: [id number]  Today's Date: 11/29/2023 11/29/23 0822   Interdisciplinary Plan of Care Collaboration   Collaboration Comments PT consult received, chart reviewed. Pt to possibly have surgery for femoral fx. Will monitor EMR and eval pt after sx.   Session Information   Date / Session Number  11/29 sx today?(eval)

## 2023-11-29 NOTE — PROGRESS NOTES
Hospital Medicine Daily Progress Note    Date of Service  11/29/2023    Chief Complaint  Geoff Scott is a 68 y.o. male admitted 11/28/2023 with right femoral neck fracture    Hospital Course  Geoff Scott is a 68 y.o. male who presented 11/28/2023 with right hip pain.     Patient has history of embolic infarcts on aspirin and Lipitor with residual hemianopsia, right BKA who presented to outside facility ER after having a mechanical fall.  He denies head strike/loss of consciousness.  CT pelvis showing impacted comminuted right femoral neck fracture, mild right hip joint space narrowing.    Interval Problem Update  Patient was seen and examined at bedside  Reported right hip pain denies nausea vomiting,     Orthopedics consulted, plan for surgery today    Vitals reviewed, labs reviewed, stable     I have discussed this patient's plan of care and discharge plan at IDT rounds today with Case Management, Nursing, Nursing leadership, and other members of the IDT team.    Consultants/Specialty  orthopedics    Code Status  Full Code    Disposition  The patient is not medically cleared for discharge to home or a post-acute facility.      I have placed the appropriate orders for post-discharge needs.    Review of Systems   All 12 systems were reviewed and negative except as mentioned above      Physical Exam  Temp:  [35.8 °C (96.4 °F)] 35.8 °C (96.4 °F)  Pulse:  [65] 65  Resp:  [16] 16  BP: (104)/(68) 104/68  SpO2:  [93 %] 93 %    Physical Exam  Constitutional:       General: He is in acute distress.      Appearance: He is ill-appearing.   HENT:      Head: Normocephalic.      Nose: Nose normal.      Mouth/Throat:      Mouth: Mucous membranes are moist.   Eyes:      Extraocular Movements: Extraocular movements intact.      Conjunctiva/sclera: Conjunctivae normal.   Cardiovascular:      Rate and Rhythm: Normal rate and regular rhythm.      Pulses: Normal pulses.      Heart sounds: Normal heart sounds.    Pulmonary:      Effort: Pulmonary effort is normal.      Breath sounds: Normal breath sounds. No wheezing or rales.   Musculoskeletal:         General: Tenderness present.      Cervical back: Normal range of motion and neck supple.   Skin:     General: Skin is warm.   Neurological:      Mental Status: He is alert and oriented to person, place, and time. Mental status is at baseline.   Psychiatric:         Mood and Affect: Mood normal.         Fluids    Intake/Output Summary (Last 24 hours) at 11/29/2023 1412  Last data filed at 11/28/2023 2344  Gross per 24 hour   Intake --   Output 350 ml   Net -350 ml       Laboratory  Recent Labs     11/29/23  0436   WBC 10.6   RBC 4.63*   HEMOGLOBIN 15.2   HEMATOCRIT 45.3   MCV 97.8   MCH 32.8   MCHC 33.6   RDW 50.5*   PLATELETCT 192   MPV 9.1     Recent Labs     11/29/23  0436   SODIUM 140   POTASSIUM 3.9   CHLORIDE 107   CO2 24   GLUCOSE 97   BUN 12   CREATININE 0.70   CALCIUM 8.2*                   Imaging  No orders to display        Assessment/Plan  * Femoral neck fracture (HCC)  Assessment & Plan  Secondary to mechanical fall  CAT scan hip showing right-sided femoral neck fracture, comminuted    Orthopedics consulted, plan for surgery today  Pain control  I ordered vitamin D level, CBC and BMP    HLD (hyperlipidemia)- (present on admission)  Assessment & Plan  Continue home statin    H/O: CVA (cerebrovascular accident)- (present on admission)  Assessment & Plan  Hx embolic infarct  Continue ASA and lipitor         VTE prophylaxis: scd, pending hip surgery    I have performed a physical exam and reviewed and updated ROS and Plan today (11/29/2023). In review of yesterday's note (11/28/2023), there are no changes except as documented above.

## 2023-11-30 ENCOUNTER — APPOINTMENT (OUTPATIENT)
Dept: RADIOLOGY | Facility: MEDICAL CENTER | Age: 68
DRG: 522 | End: 2023-11-30
Attending: STUDENT IN AN ORGANIZED HEALTH CARE EDUCATION/TRAINING PROGRAM
Payer: MEDICARE

## 2023-11-30 PROBLEM — R11.2 NAUSEA AND VOMITING: Status: ACTIVE | Noted: 2023-11-30

## 2023-11-30 PROBLEM — E83.39 HYPOPHOSPHATEMIA: Status: ACTIVE | Noted: 2023-11-30

## 2023-11-30 PROBLEM — D72.829 LEUKOCYTOSIS: Status: ACTIVE | Noted: 2023-11-30

## 2023-11-30 LAB
ANION GAP SERPL CALC-SCNC: 8 MMOL/L (ref 7–16)
BUN SERPL-MCNC: 12 MG/DL (ref 8–22)
CALCIUM SERPL-MCNC: 8.4 MG/DL (ref 8.5–10.5)
CHLORIDE SERPL-SCNC: 105 MMOL/L (ref 96–112)
CO2 SERPL-SCNC: 25 MMOL/L (ref 20–33)
CREAT SERPL-MCNC: 0.73 MG/DL (ref 0.5–1.4)
EKG IMPRESSION: NORMAL
ERYTHROCYTE [DISTWIDTH] IN BLOOD BY AUTOMATED COUNT: 49 FL (ref 35.9–50)
GFR SERPLBLD CREATININE-BSD FMLA CKD-EPI: 99 ML/MIN/1.73 M 2
GLUCOSE SERPL-MCNC: 184 MG/DL (ref 65–99)
HCT VFR BLD AUTO: 42.4 % (ref 42–52)
HGB BLD-MCNC: 14.4 G/DL (ref 14–18)
MAGNESIUM SERPL-MCNC: 2.2 MG/DL (ref 1.5–2.5)
MCH RBC QN AUTO: 32.8 PG (ref 27–33)
MCHC RBC AUTO-ENTMCNC: 34 G/DL (ref 32.3–36.5)
MCV RBC AUTO: 96.6 FL (ref 81.4–97.8)
PHOSPHATE SERPL-MCNC: 2.1 MG/DL (ref 2.5–4.5)
PLATELET # BLD AUTO: 198 K/UL (ref 164–446)
PMV BLD AUTO: 9.5 FL (ref 9–12.9)
POTASSIUM SERPL-SCNC: 4.3 MMOL/L (ref 3.6–5.5)
RBC # BLD AUTO: 4.39 M/UL (ref 4.7–6.1)
SODIUM SERPL-SCNC: 138 MMOL/L (ref 135–145)
WBC # BLD AUTO: 14.5 K/UL (ref 4.8–10.8)

## 2023-11-30 PROCEDURE — 700111 HCHG RX REV CODE 636 W/ 250 OVERRIDE (IP): Mod: JZ | Performed by: STUDENT IN AN ORGANIZED HEALTH CARE EDUCATION/TRAINING PROGRAM

## 2023-11-30 PROCEDURE — 97535 SELF CARE MNGMENT TRAINING: CPT

## 2023-11-30 PROCEDURE — 74018 RADEX ABDOMEN 1 VIEW: CPT

## 2023-11-30 PROCEDURE — 84100 ASSAY OF PHOSPHORUS: CPT

## 2023-11-30 PROCEDURE — 700105 HCHG RX REV CODE 258: Performed by: ORTHOPAEDIC SURGERY

## 2023-11-30 PROCEDURE — A9270 NON-COVERED ITEM OR SERVICE: HCPCS | Performed by: STUDENT IN AN ORGANIZED HEALTH CARE EDUCATION/TRAINING PROGRAM

## 2023-11-30 PROCEDURE — 700111 HCHG RX REV CODE 636 W/ 250 OVERRIDE (IP): Performed by: ORTHOPAEDIC SURGERY

## 2023-11-30 PROCEDURE — 700102 HCHG RX REV CODE 250 W/ 637 OVERRIDE(OP): Performed by: STUDENT IN AN ORGANIZED HEALTH CARE EDUCATION/TRAINING PROGRAM

## 2023-11-30 PROCEDURE — A9270 NON-COVERED ITEM OR SERVICE: HCPCS | Performed by: ORTHOPAEDIC SURGERY

## 2023-11-30 PROCEDURE — 83735 ASSAY OF MAGNESIUM: CPT

## 2023-11-30 PROCEDURE — 93005 ELECTROCARDIOGRAM TRACING: CPT | Performed by: STUDENT IN AN ORGANIZED HEALTH CARE EDUCATION/TRAINING PROGRAM

## 2023-11-30 PROCEDURE — 85027 COMPLETE CBC AUTOMATED: CPT

## 2023-11-30 PROCEDURE — 700102 HCHG RX REV CODE 250 W/ 637 OVERRIDE(OP): Performed by: ORTHOPAEDIC SURGERY

## 2023-11-30 PROCEDURE — 700105 HCHG RX REV CODE 258: Performed by: STUDENT IN AN ORGANIZED HEALTH CARE EDUCATION/TRAINING PROGRAM

## 2023-11-30 PROCEDURE — 93010 ELECTROCARDIOGRAM REPORT: CPT | Performed by: INTERNAL MEDICINE

## 2023-11-30 PROCEDURE — 770001 HCHG ROOM/CARE - MED/SURG/GYN PRIV*

## 2023-11-30 PROCEDURE — 99233 SBSQ HOSP IP/OBS HIGH 50: CPT | Performed by: STUDENT IN AN ORGANIZED HEALTH CARE EDUCATION/TRAINING PROGRAM

## 2023-11-30 PROCEDURE — 80048 BASIC METABOLIC PNL TOTAL CA: CPT

## 2023-11-30 PROCEDURE — 82652 VIT D 1 25-DIHYDROXY: CPT

## 2023-11-30 RX ORDER — MECLIZINE HYDROCHLORIDE 25 MG/1
12.5 TABLET ORAL 3 TIMES DAILY PRN
Status: DISCONTINUED | OUTPATIENT
Start: 2023-11-30 | End: 2023-12-01 | Stop reason: HOSPADM

## 2023-11-30 RX ORDER — OXYCODONE HYDROCHLORIDE 5 MG/1
5 TABLET ORAL EVERY 4 HOURS PRN
Status: DISCONTINUED | OUTPATIENT
Start: 2023-11-30 | End: 2023-12-01 | Stop reason: HOSPADM

## 2023-11-30 RX ORDER — SENNOSIDES A AND B 8.6 MG/1
8.6 TABLET, FILM COATED ORAL 2 TIMES DAILY
Status: DISCONTINUED | OUTPATIENT
Start: 2023-11-30 | End: 2023-12-01 | Stop reason: HOSPADM

## 2023-11-30 RX ORDER — PROCHLORPERAZINE EDISYLATE 5 MG/ML
10 INJECTION INTRAMUSCULAR; INTRAVENOUS EVERY 6 HOURS PRN
Status: DISCONTINUED | OUTPATIENT
Start: 2023-11-30 | End: 2023-12-01 | Stop reason: HOSPADM

## 2023-11-30 RX ORDER — OXYCODONE HYDROCHLORIDE 10 MG/1
10 TABLET ORAL EVERY 4 HOURS PRN
Status: DISCONTINUED | OUTPATIENT
Start: 2023-11-30 | End: 2023-12-01 | Stop reason: HOSPADM

## 2023-11-30 RX ORDER — BISACODYL 10 MG
10 SUPPOSITORY, RECTAL RECTAL
Status: DISCONTINUED | OUTPATIENT
Start: 2023-11-30 | End: 2023-12-01 | Stop reason: HOSPADM

## 2023-11-30 RX ORDER — ONDANSETRON 2 MG/ML
4 INJECTION INTRAMUSCULAR; INTRAVENOUS EVERY 6 HOURS PRN
Status: DISCONTINUED | OUTPATIENT
Start: 2023-11-30 | End: 2023-12-01 | Stop reason: HOSPADM

## 2023-11-30 RX ORDER — POLYETHYLENE GLYCOL 3350 17 G/17G
1 POWDER, FOR SOLUTION ORAL
Status: DISCONTINUED | OUTPATIENT
Start: 2023-11-30 | End: 2023-12-01 | Stop reason: HOSPADM

## 2023-11-30 RX ADMIN — SODIUM CHLORIDE: 9 INJECTION, SOLUTION INTRAVENOUS at 16:50

## 2023-11-30 RX ADMIN — ATORVASTATIN CALCIUM 80 MG: 40 TABLET, FILM COATED ORAL at 16:49

## 2023-11-30 RX ADMIN — PROCHLORPERAZINE EDISYLATE 10 MG: 5 INJECTION INTRAMUSCULAR; INTRAVENOUS at 13:16

## 2023-11-30 RX ADMIN — ONDANSETRON 4 MG: 2 INJECTION INTRAMUSCULAR; INTRAVENOUS at 15:39

## 2023-11-30 RX ADMIN — DIBASIC SODIUM PHOSPHATE, MONOBASIC POTASSIUM PHOSPHATE AND MONOBASIC SODIUM PHOSPHATE 500 MG: 852; 155; 130 TABLET ORAL at 08:13

## 2023-11-30 RX ADMIN — ASPIRIN 81 MG: 81 TABLET, CHEWABLE ORAL at 16:50

## 2023-11-30 RX ADMIN — ONDANSETRON 4 MG: 2 INJECTION INTRAMUSCULAR; INTRAVENOUS at 09:29

## 2023-11-30 RX ADMIN — DIBASIC SODIUM PHOSPHATE, MONOBASIC POTASSIUM PHOSPHATE AND MONOBASIC SODIUM PHOSPHATE 500 MG: 852; 155; 130 TABLET ORAL at 16:50

## 2023-11-30 RX ADMIN — CEFAZOLIN 2 G: 2 INJECTION, POWDER, FOR SOLUTION INTRAMUSCULAR; INTRAVENOUS at 05:55

## 2023-11-30 RX ADMIN — ASPIRIN 81 MG: 81 TABLET, CHEWABLE ORAL at 05:55

## 2023-11-30 ASSESSMENT — PAIN DESCRIPTION - PAIN TYPE
TYPE: ACUTE PAIN;SURGICAL PAIN
TYPE: ACUTE PAIN

## 2023-11-30 ASSESSMENT — ACTIVITIES OF DAILY LIVING (ADL): TOILETING: INDEPENDENT

## 2023-11-30 NOTE — PROGRESS NOTES
[facility] Medicine Daily Progress Note    Date of Service  11/30/2023    Chief Complaint  Geoff Scott is a 68 y.o. male admitted 11/28/2023 with right femoral neck fracture    Hospital Course  Geoff Scott is a 68 y.o. male who presented 11/28/2023 with right hip pain.     Patient has history of embolic infarcts on aspirin and Lipitor with residual hemianopsia, right BKA who presented to outside facility ER after having a mechanical fall.  He denies head strike/loss of consciousness.  CT pelvis showing impacted comminuted right femoral neck fracture, mild right hip joint space narrowing.    Interval Problem Update  Patient was seen and examined at bedside    S/p Right total hip arthroplasty 11/29    Patient reported intractable nausea vomiting, requires IV antiemesis, I have ordered IV Zofran and IV Compazine.  I ordered EKG to monitor Qtc.  QTc 426   I ordered a KUB    No bowel movement for 2 days-I ordered a bowel management    Phos 2.1-replaced  Wbc 10>14    I have discussed this patient's plan of care and discharge plan at IDT rounds today with Case Management, Nursing, Nursing leadership, and other members of the IDT team.    Consultants/Specialty  orthopedics    Code Status  Full Code    Disposition  The patient is not medically cleared for discharge to home or a post-acute facility.      I have placed the appropriate orders for post-discharge needs.    Review of Systems   All 12 systems were reviewed and negative except as mentioned above      Physical Exam  Temp:  [35.7 °C (96.2 °F)-37.3 °C (99.1 °F)] 36.6 °C (97.8 °F)  Pulse:  [56-79] 63  Resp:  [12-20] 16  BP: (115-156)/(55-82) 156/80  SpO2:  [92 %-100 %] 97 %    Physical Exam  Constitutional:       General: He is in acute distress.      Appearance: He is ill-appearing.   HENT:      Head: Normocephalic.      Nose: Nose normal.      Mouth/Throat:      Mouth: Mucous membranes are moist.   Eyes:      Extraocular Movements: Extraocular  movements intact.      Conjunctiva/sclera: Conjunctivae normal.   Cardiovascular:      Rate and Rhythm: Normal rate and regular rhythm.      Pulses: Normal pulses.      Heart sounds: Normal heart sounds.   Pulmonary:      Effort: Pulmonary effort is normal.      Breath sounds: Normal breath sounds. No wheezing or rales.   Musculoskeletal:         General: Tenderness present.      Cervical back: Normal range of motion and neck supple.      Comments: R BKA, R hip tenderness   Skin:     General: Skin is warm.   Neurological:      Mental Status: He is alert and oriented to person, place, and time. Mental status is at baseline.   Psychiatric:         Mood and Affect: Mood normal.         Fluids    Intake/Output Summary (Last 24 hours) at 11/30/2023 1332  Last data filed at 11/30/2023 0723  Gross per 24 hour   Intake 600 ml   Output 1415 ml   Net -815 ml       Laboratory  Recent Labs     11/29/23  0436 11/30/23  0333   WBC 10.6 14.5*   RBC 4.63* 4.39*   HEMOGLOBIN 15.2 14.4   HEMATOCRIT 45.3 42.4   MCV 97.8 96.6   MCH 32.8 32.8   MCHC 33.6 34.0   RDW 50.5* 49.0   PLATELETCT 192 198   MPV 9.1 9.5     Recent Labs     11/29/23  0436 11/30/23  0333   SODIUM 140 138   POTASSIUM 3.9 4.3   CHLORIDE 107 105   CO2 24 25   GLUCOSE 97 184*   BUN 12 12   CREATININE 0.70 0.73   CALCIUM 8.2* 8.4*                   Imaging  [id number] VIEW   Final Result      Nonspecific bowel gas pattern without acute abnormality.      DX-PELVIS-1 OR 2 VIEWS   Final Result      Status post right total hip arthroplasty without evidence of fracture.      DX-PELVIS-1 OR 2 VIEWS   Preliminary Result      Digitized intraoperative radiograph is submitted for review. This examination is not for diagnostic purpose but for guidance during a surgical procedure. Please see the patient's chart for full procedural details.         INTERPRETING LOCATION: [address] [place] [address] [place], [id number]      DX-PORTABLE FLUORO > 1 HOUR   Preliminary Result      Portable fluoroscopy  utilized for 19 seconds.         INTERPRETING LOCATION: [address] [place] [address], [place], [id number]           Assessment/Plan  * Femoral neck fracture (HCC)  Assessment & Plan  Secondary to mechanical fall  CAT scan hip showing right-sided femoral neck fracture, comminuted    Orthopedics consulted  S/p Right total hip arthroplasty 11/29  Vitamin D pending    Leukocytosis  Assessment & Plan  Wbc 10>14  Likely reactive  I ordered a procalcitonin, continue to monitor    Hypophosphatemia  Assessment & Plan  Phos 2.1-replaced    Nausea and vomiting  Assessment & Plan  Patient reported intractable nausea vomiting, requires IV antiemesis, I have ordered IV Zofran and IV Compazine.  I ordered EKG to monitor Qtc.  QTc 426   I ordered a KUB    HLD (hyperlipidemia)- (present on admission)  Assessment & Plan  Continue home statin    H/O: CVA (cerebrovascular accident)- (present on admission)  Assessment & Plan  Hx embolic infarct  Continue ASA and lipitor         VTE prophylaxis: scd, pending hip surgery    I have performed a physical exam and reviewed and updated ROS and Plan today (11/30/2023). In review of yesterday's note (11/29/2023), there are no changes except as documented above.    I spent greater than 52 minutes for chart review, obtaining history independently, performing medically appropriate examination,  documenting , ordering medications, tests, or procedures, referring and communicating with other health care professionals, Independently interpreting results and communicating results with patient/family/caregiver. More than 50% of time was spent in face-to-face clinical encounter.

## 2023-11-30 NOTE — ANESTHESIA PROCEDURE NOTES
Airway    Date/Time: 11/29/2023 4:18 PM    Performed by: Iglesia Post M.D.  Authorized by: Iglesia Post M.D.    Location:  OR  Urgency:  Elective  Difficult Airway: No    Indications for Airway Management:  Anesthesia      Spontaneous Ventilation: absent    Sedation Level:  Deep  Preoxygenated: Yes    Patient Position:  Sniffing  Mask Difficulty Assessment:  0 - not attempted  Final Airway Type:  Endotracheal airway  Final Endotracheal Airway:  ETT  Cuffed: Yes    Technique Used for Successful ETT Placement:  Direct laryngoscopy  Devices/Methods Used in Placement:  Intubating stylet    Insertion Site:  Oral  Blade Type:  Nathalie  Laryngoscope Blade/Videolaryngoscope Blade Size:  3  ETT Size (mm):  7.0  Measured from:  Teeth  ETT to Teeth (cm):  23  Placement Verified by: auscultation and capnometry    Cormack-Lehane Classification:  Grade I - full view of glottis  Number of Attempts at Approach:  1

## 2023-11-30 NOTE — CARE PLAN
The patient is Stable - Low risk of patient condition declining or worsening    Shift Goals  Clinical Goals: pain management; iv abx  Patient Goals: eat  Family Goals: haven    Progress made toward(s) clinical / shift goals:  Patient verbalizes understanding of plan of care; patient reports minimal pain; patient calls appropriately    Patient is not progressing towards the following goals:

## 2023-11-30 NOTE — ANESTHESIA TIME REPORT
Anesthesia Start and Stop Event Times       Date Time Event    11/29/2023 1545 Ready for Procedure     1613 Anesthesia Start     1716 Anesthesia Stop          Responsible Staff  11/29/23      Name Role Begin End    Iglesia Post M.D. Anesth 1613 1716          Overtime Reason:  no overtime (within assigned shift)    Comments:

## 2023-11-30 NOTE — OP REPORT
Pre-operative Dx: Displaced right femoral neck fracture  Post-operative Dx: same  Procedure:  Right total hip arthroplasty  Surgeon:  Dr. Efrem Perez MD  Assistants: Yunior Colbert PA-C  Anesthesia:  Dr. Post.  General anesthetic  EBL:  150 mL  Drains:  None  Complications:  None    Findings: Displaced intracapsular fracture with underlying degenerative change.  Excellent press-fit of the acetabular and femoral components.  Leg lengths were equal when checked with intraoperative fluoroscopy as well as palpation at the knees.  Soft tissue tension and stability were appropriate.    Implants:  Smith and Nephew R3 size 54 cup with an OR30 liner.  The stem was a size 5 standard offset Polar cementless stem with a 28 + 0 oxinium head inside of a dual mobility head.    Indications: Geoff Scott is a 68 year old male who presented with a femoral neck fracture.  He was ultimately indicated for a hip replacement.  We discussed the risk of bleeding, transfusion, pain, neurovascular injury, leg length discrepancy, dislocation, fracture, infection, wound complication, and medical complication.  No guarantees were made.  He was in agreement and elected to proceed.    Description of Procedure:    He was identified in the preoperative holding area and informed consent and site marking were confirmed. He was then brought to the operating room. Anesthesia was administered. He was positioned supine on the operative table with appropriate padding of the extremities. Sterile prepping and draping of the surgical field was completed. I performed a pre timeout to confirm we had the correct patient, side, site, presence of necessary personal, and equipment.  I confirmed that pre-operative antibiotics were administered including Ancef as well as tranexamic acid.    A direct anterior approach to the hip was completed. Anterior capsulotomy was completed for exposure of the hip joint. A femoral neck osteotomy was completed below the  fracture and the femoral head was extracted from the acetabulum. Circumferential exposure of the acetabulum was completed. Labrectomy was completed. Osteophytes were removed from the acetabular rim. Acetabular reaming was completed under direct visualization. A hemispherical press fit component was inserted using fluoroscopic navigation for alignment and depth.  An acetabular liner was locked in place and checked to ensure proper locking.    Attention was then focused to the femur. The operative leg was placed in extension, external rotation, and adduction. A posterior capsulotomy was completed to assist with femoral mobilization. The femur was then sequentially broached under direct visualization to the desired broach size. The hip was reduced and evaluated using fluoroscopy for component alignment, component sizing, leg length,offset, external rotation to 90 degrees to make sure there was no posterior impingement or anterior subluxation. The hip was dislocated with traction and external rotation. Femoral exposure was repeated. The broach was removed and the final femoral stem was implanted. The final femoral head was placed on a clean and dry trunnion. The hip was reduced. Final fluoroscopic images were completed.    Irrigation of the operative field was followed by layered closure. One gram of Vancomycin powder was left deep in the wound.  The capsule and fascia were closed with a  Quill suture.  Skin closure was completed with subcuticular monocryl, running monocryl, and Dermabond with an overlying occlusive silver dressing. The patient tolerated the procedure well and was taken to the recovery room in stable condition. All counts were correct.      Postoperative plan:  WBAT with a walker (which will need to be provided if they do not already have one due to weakness, imbalance, and fall risk), DVT ppx for 4 weeks including aspirin 81mg BID, PT/OT eval.  Followup in 2 weeks in clinic.

## 2023-11-30 NOTE — THERAPY
"Physical Therapy Education Note    Patient Name: Geoff Scott  Age:  68 y.o., Sex:  male  Medical Record #: [id number]  Today's Date: 11/30/2023    PT eval attempted, pt reporting significant nausea and feeling as if he were withdrawing despite stating he does \"not take drugs.\" Pt agreeable to subjective history (see below), mobility eval to follow. Provided pt with anterior hip precautions handout and therex to review on eval.     Dante Ansari PT, DPT        11/30/23 1054   Initial Contact Note    Initial Contact Note Order Received and Verified, Physical Therapy Evaluation in Progress with Full Report to Follow.   Precautions   Precautions Fall Risk;Anterior Hip Precautions;Weight Bearing As Tolerated Right Lower Extremity   Comments prosthesis for BKA RLE   Vitals   O2 Delivery Device None - Room Air   Prior Living Situation   Prior Services None   Housing / Facility 1 Story House   Steps Into Home 5   Steps In Home 0   Rail None   Bathroom Set up Walk In Shower;Shower Chair   Equipment Owned 4-Wheel Walker;Single Point Cane   Lives with - Patient's Self Care Capacity Spouse  (and 2 grandchildren)   Prior Level of Functional Mobility   Bed Mobility Independent   Transfer Status Independent   Ambulation Independent   Ambulation Distance   (community)   Assistive Devices Used Single Point Cane   Stairs Independent   History of Falls   History of Falls Yes   Date of Last Fall   (reason for admit)   Cognition    Cognition / Consciousness X   Level of Consciousness Alert   Comments Cooperative, limited by nausea   Interdisciplinary Plan of Care Collaboration   IDT Collaboration with  Nursing   Patient Position at End of Therapy In Bed;Bed Alarm On;Call Light within Reach;Tray Table within Reach;Phone within Reach   Collaboration Comments PT eval attempted, pt reporting significant nausea and feeling as if he were withdrawing despite stating he does \"not take drugs.\" Pt agreeable to subjective history, mobility eval " to follow.   Session Information   Date / Session Number  11/30: edu only, mobility eval needed

## 2023-11-30 NOTE — ASSESSMENT & PLAN NOTE
Patient reported intractable nausea vomiting, requires IV antiemesis, I have ordered IV Zofran and IV Compazine.  I ordered EKG to monitor Qtc.  QTc 426   I ordered a KUB

## 2023-11-30 NOTE — PROGRESS NOTES
4 Eyes Skin Assessment Completed by SEBAS Ashby and SEBAS Rendon.    Head WDL  Ears WDL  Nose WDL  Mouth WDL  Neck WDL  Breast/Chest WDL  Shoulder Blades WDL  Spine WDL  (R) Arm/Elbow/Hand Scab  (L) Arm/Elbow/Hand WDL  Abdomen WDL  Groin Swelling  Scrotum/Coccyx/Buttocks Redness to sacrum; see media photos  (R) Leg BKA; small scab to stump; surgical site to R hip with TIGIST  (L) Leg WDL  (R) Heel/Foot/Toe BKA  (L) Heel/Foot/Toe WDL          Devices In Places Blood Pressure Cuff, Pulse Ox, and SCD's; TIGIST to R hip      Interventions In Place NC W/Ear Foams, Sacral Mepilex, and Heels Loaded W/Pillows    Possible Skin Injury Yes    Pictures Uploaded Into Epic Yes  Wound Consult Placed Yes  RN Wound Prevention Protocol Ordered Yes

## 2023-11-30 NOTE — PROGRESS NOTES
Orthopedic PA Progress Note    Interval changes:  Patient doing well postop. Reports of post-anesthesia nausea  RLE TIGIST dressings are CDI  NWB RLE due to amputation- anterior approach performed so posterior hip precautions not necessary  No pending ortho procedures  DVT Prophylaxis outpatient: ASA 81 mg PO BID x4 weeks  Follow up with Dr. Perez 2 weeks postop   Cleared for DC from orthopedic standpoint pending therapy recs and medical optimization    ROS - Patient denies any new issues.  Denies any numbness or tingling. Pain well controlled.    BP (!) 156/80   Pulse 63   Temp 36.6 °C (97.8 °F) (Temporal)   Resp 16   Wt 67.1 kg (148 lb)   SpO2 97%     Patient seen and examined  No acute distress  Breathing non labored  RRR  RLE: Dressings CDI. Distally NVI.     Recent Labs     11/29/23  0436 11/30/23  0333   WBC 10.6 14.5*   RBC 4.63* 4.39*   HEMOGLOBIN 15.2 14.4   HEMATOCRIT 45.3 42.4   MCV 97.8 96.6   MCH 32.8 32.8   MCHC 33.6 34.0   RDW 50.5* 49.0   PLATELETCT 192 198   MPV 9.1 9.5       Active Hospital Problems    Diagnosis     Nausea and vomiting [R11.2]     Hypophosphatemia [E83.39]     Leukocytosis [D72.829]     Femoral neck fracture (HCC) [S72.009A]     Age-related osteopor with curr pathol fx of right femur with nonunion [M80.051K]     H/O: CVA (cerebrovascular accident) [Z86.73]     HLD (hyperlipidemia) [E78.5]        Assessment/Plan:  NWB RLE due to amputation- anterior approach performed so posterior hip precautions not necessary  No pending ortho procedures  DVT Prophylaxis outpatient: ASA 81 mg PO BID x4 weeks  Follow up with Dr. Perez 2 weeks postop   Cleared for DC from orthopedic standpoint pending therapy recs and medical optimization    POD#1 S/p  Right total hip arthroplasty   Wt bearing status - NWB RLE  Wound care/Drains - Dressings to be left in place  Future Procedures - None planned   Lovenox: On asa  Sutures/Staples out- 14-21 days post operatively. Removal will completed by ortho  TAMIA's unless transferred.  DVT Prophylaxis outpatient: ASA 81 mg PO BID x4 weeks  PT/OT-initiated  Antibiotics:  Perioperative completed  DVT Prophylaxis- TEDS/SCDs/Foot pumps  Hussein-not needed per ortho  Case Coordination for Discharge Planning - Disposition per therapy recs.

## 2023-11-30 NOTE — ANESTHESIA POSTPROCEDURE EVALUATION
Patient: Geoff Scott    Procedure Summary       Date: 11/29/23 Room / Location: [place] [address] / SURGERY [facility]    Anesthesia Start: 1613 Anesthesia Stop: 1716    Procedure: ARTHROPLASTY, HIP, TOTAL, ANTERIOR APPROACH (Right: Hip) Diagnosis: (Displaced right femoral neck fracture with ipsilateral below-knee amputation)    Surgeons: Efrem Perez M.D. Responsible Provider: Iglesia Post M.D.    Anesthesia Type: general ASA Status: 3            Final Anesthesia Type: general  Last vitals  BP   Blood Pressure : 115/55    Temp   35.9 °C (96.7 °F)    Pulse   (!) 56   Resp   18    SpO2   98 %      Anesthesia Post Evaluation    Patient location during evaluation: PACU  Patient participation: complete - patient participated  Level of consciousness: awake and alert    Airway patency: patent  Anesthetic complications: no  Cardiovascular status: hemodynamically stable  Respiratory status: acceptable  Hydration status: euvolemic    PONV: none          No notable events documented.     Nurse Pain Score: 8 (NPRS)

## 2023-11-30 NOTE — DISCHARGE PLANNING
Patient underwent surgery yesterday. Pending PT/OT post-operative evaluations and recommendations. PASRR completed by RN CM preemptively ([id number]).   RN CM will continue to follow patient's case and place appropriate referrals.     Case Management Discharge Planning    Admission Date: 11/28/2023  GMLOS: 4.1  ALOS: 2    6-Clicks ADL Score: 24  6-Clicks Mobility Score: 24      Anticipated Discharge Dispo: Discharge Disposition: D/T to SNF with Medicare cert in anticipation of skilled care (03)  Discharge Address: [address] [place] [address] Krystal CLAIRE  Discharge Contact Phone Number: [phone]    DME Needed: No    Action(s) Taken: Updated Provider/Nurse on Discharge Plan    Escalations Completed: None    Medically Clear: No    Next Steps: Discuss discharge plan with IDT after post-op PT/OT therapy.     Barriers to Discharge: Medical clearance and Pending PT Evaluation    Is the patient up for discharge tomorrow: No

## 2023-11-30 NOTE — H&P
Surgery Orthopedic History & Physical Note    Date  11/29/2023    Primary Care Physician  Pcp Pt States None    CC  * No Diagnosis Codes entered *    HPI  68-year-old male with a history of CVA and right-sided below-knee amputation was transferred in after a ground-level fall resulting in a displaced right femoral neck fracture.  He is initially seen by the trauma service who referred him to me for consideration of hip replacement.  Does not have pain in the other extremities.  No other complaints at this time.    Past Medical History:   Diagnosis Date    Hx of right BKA (HCC)     since age of 10    Stroke (HCC)     CVA in 2005, no remaining deficits       No past surgical history on file.    Current Facility-Administered Medications   Medication Dose Route Frequency Provider Last Rate Last Admin    [MAR Hold] ondansetron (Zofran ODT) dispertab 4 mg  4 mg Oral Q4HRS PRN Caro Corona M.D.        NS infusion   Intravenous Continuous Ronak Aguilar M.D. 125 mL/hr at 11/29/23 1021 New Bag at 11/29/23 1021    [MAR Hold] acetaminophen (Tylenol) tablet 650 mg  650 mg Oral Q6HRS PRN Ronak Aguilar M.D.        [MAR Hold] aspirin (Asa) chewable tab 81 mg  81 mg Oral DAILY Ronak Aguilar M.D.   81 mg at 11/29/23 0550    [MAR Hold] atorvastatin (Lipitor) tablet 80 mg  80 mg Oral Q EVENING Ronak Aguilar M.D.        [MAR Hold] HYDROmorphone (Dilaudid) injection 1 mg  1 mg Intravenous Q4HRS PRN Ronak Aguilar M.D.   1 mg at 11/29/23 1319       Social History     Socioeconomic History    Marital status:      Spouse name: Not on file    Number of children: Not on file    Years of education: Not on file    Highest education level: Not on file   Occupational History    Not on file   Tobacco Use    Smoking status: Some Days    Smokeless tobacco: Never   Substance and Sexual Activity    Alcohol use: No    Drug use: Yes     Types: Inhaled     Comment: cannabis    Sexual activity: Not on file   Other Topics Concern     Not on file   Social History Narrative    Not on file     Social Determinants of Health     Financial Resource Strain: Not on file   Food Insecurity: Not on file   Transportation Needs: Not on file   Physical Activity: Not on file   Stress: Not on file   Social Connections: Not on file   Intimate Partner Violence: Not on file   Housing Stability: Not on file       No family history on file.    Allergies  Banana and Grape, artificial    Review of Systems  Negative    Physical Exam  Regular rate and rhythm  Nonlabored breathing  Abdomen soft and nontender   Neurovascular intact distally  Skin is in good condition    Vital Signs  Blood Pressure : 130/70   Temperature: 36.8 °C (98.2 °F)   Pulse: (!) 58   Respiration: 15   Pulse Oximetry: 97 %       Labs:  Recent Labs     11/29/23  0436   WBC 10.6   RBC 4.63*   HEMOGLOBIN 15.2   HEMATOCRIT 45.3   MCV 97.8   MCH 32.8   MCHC 33.6   RDW 50.5*   PLATELETCT 192   MPV 9.1     Recent Labs     11/29/23  0436   SODIUM 140   POTASSIUM 3.9   CHLORIDE 107   CO2 24   GLUCOSE 97   BUN 12   CREATININE 0.70   CALCIUM 8.2*               Radiology:  DX-PORTABLE FLUORO > 1 HOUR    (Results Pending)   DX-PELVIS-1 OR 2 VIEWS    (Results Pending)         Assessment/Plan:    Displaced right femoral neck fracture with ipsilateral below-knee amputation    Discussed the options and use indicated for total hip arthroplasty.  Discussed the risk and benefits and alternatives and he like to proceed.

## 2023-11-30 NOTE — OR NURSING
Patient recovered well in post-op. A & O x4, VSS, on 1 L O2 NC. Surgical sites clean, dry and intact. Surgical pain managed through po medication. Patient tolerating fluids without nausea. Thalia updated and discussed POC. Patient belongings on [facility]. Report called to Marck. Awaiting transport.

## 2023-11-30 NOTE — THERAPY
Occupational Therapy Contact Note    Patient Name: Geoff Scott  Age:  68 y.o., Sex:  male  Medical Record #: [id number]  Today's Date: 11/30/2023    OT eval attempted. Pt agreeable to provide history, but declined OOB activity when prompted to sit EOB. Pt reported feeling very nauseous. Pt educated regarding precautions, WB status, the pathology of bedrest, and the role of OT. Will continue to follow and complete eval as appropriate once pt's nausea better controlled.     11/30/23 1528   Prior Living Situation   Prior Services None   Housing / Facility 1 Story House   Steps Into Home 5   Steps In Home 0   Rail None   Bathroom Set up Walk In Shower;Shower Chair   Equipment Owned 4-Wheel Walker;Single Point Cane   Lives with - Patient's Self Care Capacity Spouse;Child Less than 18 Years of Age   Comments Pt lives with his wife and two teenage grandchildren. Pt reported they are supportive and can assist as needed.   Prior Level of ADL Function   Self Feeding Independent   Grooming / Hygiene Independent   Bathing Independent   Dressing Independent   Toileting Independent   Prior Level of IADL Function   Medication Management Independent   Laundry Independent   Kitchen Mobility Independent   Finances Independent   Home Management Independent   Shopping Independent   Prior Level Of Mobility Independent With Device in Home;Independent With Device in Community   Occupation (Pre-Hospital Vocational) Retired Due To Age   History of Falls   History of Falls Yes   Date of Last Fall   (reason for admission, reported tripping while walking the dog)   Precautions   Precautions Fall Risk;Anterior Hip Precautions;Weight Bearing As Tolerated Left Lower Extremity

## 2023-12-01 ENCOUNTER — APPOINTMENT (OUTPATIENT)
Dept: RADIOLOGY | Facility: MEDICAL CENTER | Age: 68
DRG: 522 | End: 2023-12-01
Attending: STUDENT IN AN ORGANIZED HEALTH CARE EDUCATION/TRAINING PROGRAM
Payer: MEDICARE

## 2023-12-01 ENCOUNTER — PHARMACY VISIT (OUTPATIENT)
Dept: PHARMACY | Facility: MEDICAL CENTER | Age: 68
End: 2023-12-01
Payer: COMMERCIAL

## 2023-12-01 VITALS
HEART RATE: 78 BPM | DIASTOLIC BLOOD PRESSURE: 82 MMHG | OXYGEN SATURATION: 96 % | TEMPERATURE: 98.4 F | BODY MASS INDEX: 23.17 KG/M2 | RESPIRATION RATE: 18 BRPM | WEIGHT: 148 LBS | SYSTOLIC BLOOD PRESSURE: 108 MMHG

## 2023-12-01 PROCEDURE — A9270 NON-COVERED ITEM OR SERVICE: HCPCS | Performed by: STUDENT IN AN ORGANIZED HEALTH CARE EDUCATION/TRAINING PROGRAM

## 2023-12-01 PROCEDURE — 97535 SELF CARE MNGMENT TRAINING: CPT

## 2023-12-01 PROCEDURE — 99239 HOSP IP/OBS DSCHRG MGMT >30: CPT | Performed by: STUDENT IN AN ORGANIZED HEALTH CARE EDUCATION/TRAINING PROGRAM

## 2023-12-01 PROCEDURE — RXMED WILLOW AMBULATORY MEDICATION CHARGE: Performed by: STUDENT IN AN ORGANIZED HEALTH CARE EDUCATION/TRAINING PROGRAM

## 2023-12-01 PROCEDURE — 700105 HCHG RX REV CODE 258: Performed by: STUDENT IN AN ORGANIZED HEALTH CARE EDUCATION/TRAINING PROGRAM

## 2023-12-01 PROCEDURE — A9270 NON-COVERED ITEM OR SERVICE: HCPCS | Performed by: ORTHOPAEDIC SURGERY

## 2023-12-01 PROCEDURE — 97163 PT EVAL HIGH COMPLEX 45 MIN: CPT

## 2023-12-01 PROCEDURE — 97166 OT EVAL MOD COMPLEX 45 MIN: CPT

## 2023-12-01 PROCEDURE — 700102 HCHG RX REV CODE 250 W/ 637 OVERRIDE(OP): Performed by: ORTHOPAEDIC SURGERY

## 2023-12-01 PROCEDURE — 700102 HCHG RX REV CODE 250 W/ 637 OVERRIDE(OP): Performed by: STUDENT IN AN ORGANIZED HEALTH CARE EDUCATION/TRAINING PROGRAM

## 2023-12-01 RX ORDER — ONDANSETRON 4 MG/1
4 TABLET, ORALLY DISINTEGRATING ORAL EVERY 8 HOURS PRN
Qty: 15 TABLET | Refills: 0 | Status: SHIPPED | OUTPATIENT
Start: 2023-12-01 | End: 2023-12-06

## 2023-12-01 RX ORDER — OXYCODONE HYDROCHLORIDE 5 MG/1
5 TABLET ORAL EVERY 6 HOURS PRN
Qty: 20 TABLET | Refills: 0 | Status: SHIPPED | OUTPATIENT
Start: 2023-12-01 | End: 2023-12-06

## 2023-12-01 RX ORDER — MECLIZINE HCL 12.5 MG/1
12.5 TABLET ORAL 3 TIMES DAILY PRN
Qty: 30 TABLET | Refills: 0 | Status: SHIPPED | OUTPATIENT
Start: 2023-12-01 | End: 2023-12-31

## 2023-12-01 RX ORDER — ASPIRIN 81 MG/1
81 TABLET, CHEWABLE ORAL 2 TIMES DAILY
Qty: 52 TABLET | Refills: 0 | Status: SHIPPED | OUTPATIENT
Start: 2023-12-01 | End: 2023-12-27

## 2023-12-01 RX ADMIN — DIBASIC SODIUM PHOSPHATE, MONOBASIC POTASSIUM PHOSPHATE AND MONOBASIC SODIUM PHOSPHATE 500 MG: 852; 155; 130 TABLET ORAL at 13:49

## 2023-12-01 RX ADMIN — ASPIRIN 81 MG: 81 TABLET, CHEWABLE ORAL at 04:49

## 2023-12-01 RX ADMIN — SODIUM CHLORIDE: 9 INJECTION, SOLUTION INTRAVENOUS at 04:52

## 2023-12-01 RX ADMIN — MECLIZINE HYDROCHLORIDE 12.5 MG: 25 TABLET ORAL at 10:40

## 2023-12-01 RX ADMIN — DIBASIC SODIUM PHOSPHATE, MONOBASIC POTASSIUM PHOSPHATE AND MONOBASIC SODIUM PHOSPHATE 500 MG: 852; 155; 130 TABLET ORAL at 08:09

## 2023-12-01 ASSESSMENT — GAIT ASSESSMENTS
DEVIATION: ANTALGIC;STEP TO;BRADYKINETIC;DECREASED HEEL STRIKE;DECREASED TOE OFF
ASSISTIVE DEVICE: FRONT WHEEL WALKER
GAIT LEVEL OF ASSIST: CONTACT GUARD ASSIST
DISTANCE (FEET): 40

## 2023-12-01 ASSESSMENT — COGNITIVE AND FUNCTIONAL STATUS - GENERAL
CLIMB 3 TO 5 STEPS WITH RAILING: A LOT
TOILETING: A LITTLE
TURNING FROM BACK TO SIDE WHILE IN FLAT BAD: A LITTLE
STANDING UP FROM CHAIR USING ARMS: A LITTLE
MOVING FROM LYING ON BACK TO SITTING ON SIDE OF FLAT BED: A LITTLE
MOVING FROM LYING ON BACK TO SITTING ON SIDE OF FLAT BED: A LITTLE
STANDING UP FROM CHAIR USING ARMS: A LITTLE
SUGGESTED CMS G CODE MODIFIER DAILY ACTIVITY: CK
PERSONAL GROOMING: A LITTLE
WALKING IN HOSPITAL ROOM: A LITTLE
DAILY ACTIVITIY SCORE: 18
DRESSING REGULAR LOWER BODY CLOTHING: A LITTLE
DRESSING REGULAR LOWER BODY CLOTHING: A LITTLE
SUGGESTED CMS G CODE MODIFIER MOBILITY: CK
MOVING TO AND FROM BED TO CHAIR: A LITTLE
DAILY ACTIVITIY SCORE: 22
HELP NEEDED FOR BATHING: A LITTLE
HELP NEEDED FOR BATHING: A LITTLE
SUGGESTED CMS G CODE MODIFIER MOBILITY: CL
TURNING FROM BACK TO SIDE WHILE IN FLAT BAD: A LITTLE
WALKING IN HOSPITAL ROOM: A LITTLE
EATING MEALS: A LITTLE
CLIMB 3 TO 5 STEPS WITH RAILING: TOTAL
MOVING TO AND FROM BED TO CHAIR: UNABLE
MOBILITY SCORE: 17
MOBILITY SCORE: 14
DRESSING REGULAR UPPER BODY CLOTHING: A LITTLE
SUGGESTED CMS G CODE MODIFIER DAILY ACTIVITY: CJ

## 2023-12-01 ASSESSMENT — ACTIVITIES OF DAILY LIVING (ADL): TOILETING: INDEPENDENT

## 2023-12-01 ASSESSMENT — PAIN DESCRIPTION - PAIN TYPE
TYPE: ACUTE PAIN
TYPE: ACUTE PAIN;SURGICAL PAIN

## 2023-12-01 NOTE — DISCHARGE INSTRUCTIONS
- Follow up with primary care physician in 1 week.   - Follow up with orthopedics as instructed    - -Please take aspirin 81 mg twice daily for 4 weeks for DVT prophylaxis per Ortho recommendation, that you can go back to aspirin 81 mg daily as your home regimen    - Please take the medications as instructed    - Go to the local Emergency Department if you have any worsening condition.

## 2023-12-01 NOTE — PROGRESS NOTES
FWW fit to pt and left at bedside. Pt instructed on use and adjustments if necessary after dc. All relevant questions answered at this time.    Contact traction for any questions or concerns regarding this DME.

## 2023-12-01 NOTE — CARE PLAN
The patient is Stable - Low risk of patient condition declining or worsening    Shift Goals  Clinical Goals: pain control, physical therapy  Patient Goals: pain control  Family Goals: n/a    Progress made toward(s) clinical / shift goals:    Problem: Gastrointestinal Irritability  Goal: Nausea and vomiting will be absent or improve  Outcome: Progressing   Nausea and vomiting has subsided during the shift, no anti-emesis medications needed.   Problem: Mobility  Goal: Patient's capacity to carry out activities will improve  Outcome: Progressing   The patient is ambulating with staff well.   Problem: Self Care  Goal: Patient will have the ability to perform ADLs independently or with assistance (bathe, groom, dress, toilet and feed)  Outcome: Progressing   The patient is able to perform most of his ADLs with minimal assistance   Problem: Pain - Post Surgery  Goal: Alleviation or reduction of pain post surgery  Outcome: Progressing   Patient is complaining of minimal pain at this time, does not want to take any pain medications at this time.   Problem: Incision Care  Goal: Optimal post surgical incision care  Outcome: Progressing  Dressing intact, no issues at this time.     Patient is not progressing towards the following goals:

## 2023-12-01 NOTE — DISCHARGE SUMMARY
Discharge Summary    CHIEF COMPLAINT ON ADMISSION  No chief complaint on file.      Reason for Admission  Right hip fracture     Admission Date  11/28/2023    CODE STATUS  Full Code    HPI & HOSPITAL COURSE  Geoff Scott is a 68 y.o. male history of embolic infarcts on aspirin and Lipitor with residual hemianopsia, right BKA who presented to outside facility ER after having a mechanical fall, who admitted 11/28/2023 with right hip fracture. CT pelvis showing impacted comminuted right femoral neck fracture, mild right hip joint space narrowing.  Orthopedics consulted, s/p Right total hip arthroplasty 11/29 by Dr. Perez.   Patient developed nausea and vomiting after procedure, which was resolved after supportive care.  KUB negative for acute pathology.  Abnormal electrolytes were replaced.    Patient is cleared for discharge by orthopedics.  Recommended nonweightbearing of right lower extremity, sutures/Staples removal 14-21 days post operatively (surgery 11/29). Removal will completed by ortho TAMIA's .  Ortho recommended aspirin 81 mg PO BID x4 weeks for DVT prophylaxis.  Then patient can go back to aspirin 81 mg daily as his home regimen    PT OT recommended home health, however, home health was declined by his insurance.  I ordered him outpatient PT OT.  He lives with his son and family, has good family support.    Therefore, he is discharged in good and stable condition to home with close outpatient follow-up.    The patient met 2-midnight criteria for an inpatient stay at the time of discharge.    Discharge Date  12/1/2023    FOLLOW UP ITEMS POST DISCHARGE  - Follow up with primary care physician in 1 week.   - Follow up with orthopedics as instructed    - -Please take aspirin 81 mg twice daily for 4 weeks for DVT prophylaxis per Ortho recommendation, that you can go back to aspirin 81 mg daily as your home regimen    - Please take the medications as instructed    - Go to the local Emergency Department if  you have any worsening condition.         DISCHARGE DIAGNOSES  Principal Problem:    Femoral neck fracture (HCC) (POA: Unknown)  Active Problems:    H/O: CVA (cerebrovascular accident) (POA: Yes)    HLD (hyperlipidemia) (POA: Yes)    Age-related osteopor with curr pathol fx of right femur with nonunion (POA: Yes)    Nausea and vomiting (POA: Unknown)    Hypophosphatemia (POA: Unknown)    Leukocytosis (POA: Unknown)  Resolved Problems:    Hip fracture requiring operative repair, right, closed, initial encounter ([facility]) (POA: Yes)      FOLLOW UP  No future appointments.  Efrem Perez M.D.  [address] [facility] [address]  [phone]    Call in 2 week(s)  follow up recommended by specialist, suture removal      MEDICATIONS ON DISCHARGE     Medication List        START taking these medications        Instructions   ondansetron 4 MG Tbdp  Commonly known as: Zofran ODT   Take 1 Tablet by mouth every 8 hours as needed for Nausea/Vomiting for up to 5 days.  Dose: 4 mg     oxyCODONE immediate-release 5 MG Tabs  Commonly known as: Roxicodone   Take 1 Tablet by mouth every 6 hours as needed for Severe Pain for up to 5 days.  Dose: 5 mg     phosphorus 250 MG tablet  Commonly known as: K-Phos-Neutral   Take 1 Tablet by mouth 3 times a day for 2 days.  Dose: 1 Tablet            CHANGE how you take these medications        Instructions   aspirin 81 MG Chew chewable tablet  What changed: when to take this  Commonly known as: Asa   Chew 1 Tablet 2 times a day for 26 days.  Dose: 81 mg            CONTINUE taking these medications        Instructions   atorvastatin 80 MG tablet  Commonly known as: Lipitor   Take 1 Tablet by mouth every evening.  Dose: 80 mg              Allergies  Allergies   Allergen Reactions    Banana Hives and Itching    Grape, Artificial Hives and Itching     ALLERGIC TO GRAPES.       DIET  Orders Placed This Encounter   Procedures    Diet Order Diet: Low Fiber(GI Soft) (please add 2-3 water bottles with  meals)     Standing Status:   Standing     Number of Occurrences:   1     Order Specific Question:   Diet:     Answer:   Low Fiber(GI Soft) [2]     Comments:   please add 2-3 water bottles with meals       ACTIVITY  As tolerated.  Weight bearing as tolerated    CONSULTATIONS  Orthopedics    PROCEDURES  s/p Right total hip arthroplasty 11/29 by Dr. Perez.     LABORATORY  Lab Results   Component Value Date    SODIUM 138 11/30/2023    POTASSIUM 4.3 11/30/2023    CHLORIDE 105 11/30/2023    CO2 25 11/30/2023    GLUCOSE 184 (H) 11/30/2023    BUN 12 11/30/2023    CREATININE 0.73 11/30/2023        Lab Results   Component Value Date    WBC 14.5 (H) 11/30/2023    HEMOGLOBIN 14.4 11/30/2023    HEMATOCRIT 42.4 11/30/2023    PLATELETCT 198 11/30/2023        Total time of the discharge process exceeds 35 minutes.

## 2023-12-01 NOTE — PROGRESS NOTES
Orthopaedic Progress Note    Interval changes:  Patient doing well    Right hip dressings are CDI  Cleared for DC to home by ortho pending medcine clearance    ROS - Patient denies any new issues.  Pain well controlled.    /82   Pulse 78   Temp 36.9 °C (98.4 °F) (Temporal)   Resp 18   Wt 67.1 kg (148 lb)   SpO2 96%     Patient seen and examined  No acute distress  Breathing non labored  RRR  RLE gab dressing in place without issues, DNVI, cap refill <2 sec distally.     Recent Labs     11/29/23  0436 11/30/23  0333   WBC 10.6 14.5*   RBC 4.63* 4.39*   HEMOGLOBIN 15.2 14.4   HEMATOCRIT 45.3 42.4   MCV 97.8 96.6   MCH 32.8 32.8   MCHC 33.6 34.0   RDW 50.5* 49.0   PLATELETCT 192 198   MPV 9.1 9.5       Active Hospital Problems    Diagnosis     Nausea and vomiting [R11.2]     Hypophosphatemia [E83.39]     Leukocytosis [D72.829]     Femoral neck fracture (HCC) [S72.009A]     Age-related osteopor with curr pathol fx of right femur with nonunion [M80.051K]     H/O: CVA (cerebrovascular accident) [Z86.73]     HLD (hyperlipidemia) [E78.5]        Assessment/Plan:  Patient doing well    Right hip dressings are CDI  Cleared for DC to home by ortho pending medcine clearance  POD#2 S/P right total hip arthroplasty  Wt bearing status - WBAT with walker. Anterior hip precautions  Wound care/Drains - Dressings to be changed every other day by nursing. Or PRN for saturation starting POD#2  Future Procedures - none planned   Sutures/Staples out- 14-21 days post operatively. Removal will completed by ortho mid levels only.  PT/OT-initiated  Antibiotics: Perioperative completed  DVT Prophylaxis- TEDS/SCDs/Foot pumps/aspirin  Hussein-not needed per ortho  Case Coordination for Discharge Planning - Disposition per therapy recs.

## 2023-12-01 NOTE — THERAPY
Physical Therapy   Initial Evaluation     Patient Name: Geoff Scott  Age:  68 y.o., Sex:  male  Medical Record #: [id number]  Today's Date: 12/1/2023     Precautions  Precautions: Fall Risk;Anterior Hip Precautions;Weight Bearing As Tolerated Left Lower Extremity  Comments: prosthesis for BKA RLE    Assessment  Patient is 68 y.o. male with h/o Right BKA, CVA, DM, and HTN. Pt admitted post GLF with subsequent Right femoral neck Fx, s/p anterior approach R ARPIT, no WB restriction per order set.   Pt required set up to althea prosthesis and ambulate in room, pt requires cues for sequencing and safety with FWW.  Pt states he has a 4WW at home, education on benefit of FWW for increased stability. Order for FWW placed. Pt will benefit from continued inpatient as well as home roman therapy if available in his area.   Plan    Physical Therapy Initial Treatment Plan   Treatment Plan : (P) Bed Mobility, Equipment, Gait Training, Manual Therapy, Neuro Re-Education / Balance, Self Care / Home Evaluation, Prosthetics Training, Stair Training, Therapeutic Activities, Therapeutic Exercise  Treatment Frequency: (P) 5 Times per Week  Duration: (P) Until Therapy Goals Met    DC Equipment Recommendations: (P) Front-Wheel Walker  Discharge Recommendations: (P) Recommend home health for continued physical therapy services       Initial Contact Note    Initial Contact Note Order Received and Verified, Physical Therapy Evaluation in Progress with Full Report to Follow.   Precautions   Precautions Fall Risk;No Weight Bearing Restrictions Right Lower Extremity;Anterior Hip Precautions   Comments RLE prosthesis.   Pain 0 - 10 Group   Location Hip   Location Orientation Right   Pain Rating Scale (NPRS) 3   Description Aching;Burning   Therapist Pain Assessment Post Activity Pain Same as Prior to Activity;Nurse Notified;3   Prior Living Situation   Prior Services None   Housing / Facility 1 Story House   Steps Into Home 5   Steps In Home 0    Rail None   Equipment Owned 4-Wheel Walker;Single Point Cane   Comments order written for FWW for home   Prior Level of Functional Mobility   Bed Mobility Independent   Transfer Status Independent   Ambulation Independent   Ambulation Distance community   Assistive Devices Used Single Point Cane   Stairs Independent   Comments Resides in [facility].   History of Falls   History of Falls Yes   Date of Last Fall   (reason for admission, fell while walking dog.)   Cognition    Level of Consciousness Alert   Active ROM Lower Body    Active ROM Lower Body  WDL   Strength Lower Body   Lower Body Strength  X   Gross Strength Generalized Weakness, Equal Bilaterally   Other Treatments   Other Treatments Provided set up for RLE prosthesis donning, education on WB, ant precautions, use of FWW vs 4WW for stability during ambulation, benefit of  services   Balance Assessment   Sitting Balance (Static) Good   Sitting Balance (Dynamic) Fair +   Standing Balance (Static) Fair +   Standing Balance (Dynamic) Fair   Weight Shift Sitting Good   Weight Shift Standing Fair   Comments w/FWW and RLE prosthesis.   Bed Mobility    Supine to Sit Minimal Assist   Sit to Supine   (up to chair post session.)   Scooting Supervised   Gait Analysis   Gait Level Of Assist Contact Guard Assist   Assistive Device Front Wheel Walker   Distance (Feet) 40   # of Times Distance was Traveled 1   Deviation Antalgic;Step To;Bradykinetic;Decreased Heel Strike;Decreased Toe Off  (Heavy reliance on FWW for support.)   # of Stairs Climbed 0   Weight Bearing Status no restrictions.   Comments Pt able to althea prosthesis in sitting with set up only.   Functional Mobility   Sit to Stand Contact Guard Assist   Bed, Chair, Wheelchair Transfer Contact Guard Assist   Toilet Transfers Contact Guard Assist   Transfer Method Stand Step   Mobility supine>EOB>gait in room >toilet> chair.   How much difficulty does the patient currently have...   Turning over in bed  (including adjusting bedclothes, sheets and blankets)? 3   Sitting down on and standing up from a chair with arms (e.g., wheelchair, bedside commode, etc.) 3   Moving from lying on back to sitting on the side of the bed? 1   How much help from another person does the patient currently need...   Moving to and from a bed to a chair (including a wheelchair)? 3   Need to walk in a hospital room? 3   Climbing 3-5 steps with a railing? 1   6 clicks Mobility Score 14   Short Term Goals    Short Term Goal # 1 Pt will perform bed mobility at S level by tx 6   Short Term Goal # 2 Pt will transfer with FWW at S level by tx 6   Short Term Goal # 3 Pt will ambulate with FWW for 100 ft with S by tx 6   Short Term Goal # 4 Pt will ascend and descend steps x 5 with rail at S level by tx 6   Education Group   Education Provided Hip Precautions Anterior;Role of Physical Therapist;Gait Training;Use of Assistive Device;Exercises - Supine;Weight Bearing Status   Hip Precautions Anterior Patient Response Patient;Acceptance;Explanation;Verbal Demonstration;Action Demonstration   Role of Physical Therapist Patient Response Patient;Acceptance;Explanation;Verbal Demonstration   Gait Training Patient Response Patient;Acceptance;Explanation;Demonstration;Reinforcement Needed   Use of Assistive Device Patient Response Patient;Acceptance;Explanation;Reinforcement Needed   Exercises - Supine Patient Response Patient;Acceptance;Explanation;Demonstration;No Learning Evidence   Weight Bearing Status Patient Response Patient;Acceptance;Explanation;Verbal Demonstration;Action Demonstration   Physical Therapy Initial Treatment Plan    Treatment Plan  Bed Mobility;Equipment;Gait Training;Manual Therapy;Neuro Re-Education / Balance;Self Care / Home Evaluation;Prosthetics Training;Stair Training;Therapeutic Activities;Therapeutic Exercise   Treatment Frequency 5 Times per Week   Duration Until Therapy Goals Met   Problem List    Problems Pain;Impaired Bed  Mobility;Impaired Transfers;Impaired Ambulation;Functional Strength Deficit;Decreased Activity Tolerance;Safety Awareness Deficits / Cognition;Impaired Balance   Anticipated Discharge Equipment and Recommendations   DC Equipment Recommendations Front-Wheel Walker   Discharge Recommendations Recommend home health for continued physical therapy services   Interdisciplinary Plan of Care Collaboration   IDT Collaboration with  Nursing;Occupational Therapist   Patient Position at End of Therapy Seated;Chair Alarm On;Call Light within Reach;Tray Table within Reach;Phone within Reach   Collaboration Comments staff updated.   Session Information   Date / Session Number  12/1-1 ( 1/5,12/7)

## 2023-12-01 NOTE — DISCHARGE PLANNING
Received choice form @: 1125  Agency/Facility name: [facility]  Sent referral per choice form @: 1142    Call placed to [facility] to confirm they will accept insurance,

## 2023-12-01 NOTE — THERAPY
"Occupational Therapy   Initial Evaluation     Patient Name: Geoff Scott  Age:  68 y.o., Sex:  male  Medical Record #: [id number]  Today's Date: 12/1/2023     Precautions: Fall Risk, Anterior Hip Precautions, Weight Bearing As Tolerated Left Lower Extremity  Comments: prosthesis for BKA RLE    Assessment  Patient is 68 y.o. male admitted after GLF w/femur fx. PMHx: CVA w/residual vision loss, R BKA, HLD, DM, TIA and HTN.   This admission pt is dx w/femoral neck fx s/p ARPIT, leukocytosis, and hypophosphatemia. Today pt was able to don his prosthetic and walk short distances in his room. Pt does have decreased attention and safety awareness and tends to push FWW to the side w/standing ADL's. Pt would benefit from  for home safety and carry over to address fall prevention.      Plan  Occupational Therapy Initial Treatment Plan   Treatment Interventions: Self Care / Activities of Daily Living, Manual Therapy Techniques, Therapeutic Exercises, Therapeutic Activity  Treatment Frequency: 4 Times per Week  Duration: Until Therapy Goals Met    DC Equipment Recommendations: Front-Wheel Walker  Discharge Recommendations: Recommend home health for continued occupational therapy services     Subjective  \"I am so cold\"      Objective     12/01/23 1159   Charge Group   OT Evaluation OT Evaluation Mod   Total Time Spent   OT Time Spent Yes   OT Evaluation (Minutes) 15   OT Total Time Spent (Calculated) 15   Initial Contact Note    Initial Contact Note Order Received and Verified, Occupational Therapy Evaluation in Progress with Full Report to Follow.   Prior Living Situation   Prior Services None   Housing / Facility 1 Story House   Steps Into Home 5   Steps In Home 0   Bathroom Set up Walk In Shower;Shower Chair   Equipment Owned 4-Wheel Walker;Single Point Cane   Lives with - Patient's Self Care Capacity Spouse;Child Less than 18 Years of Age   Comments Pt lives with his wife and two teenage grandchildren. Pt reported they " are supportive and can assist as needed.   Prior Level of ADL Function   Self Feeding Independent   Grooming / Hygiene Independent   Bathing Independent   Dressing Independent   Toileting Independent   Prior Level of IADL Function   Medication Management Independent   Laundry Independent   Kitchen Mobility Independent   Finances Independent   Home Management Independent   Shopping Independent   Prior Level Of Mobility Independent With Device in Home;Independent With Device in Community   History of Falls   History of Falls Yes   Precautions   Precautions Fall Risk;Anterior Hip Precautions;Weight Bearing As Tolerated Left Lower Extremity   Comments prosthesis for BKA RLE   Pain 0 - 10 Group   Location Hip   Location Orientation Right   Therapist Pain Assessment During Activity;Nurse Notified;0   Cognition    Cognition / Consciousness X   Level of Consciousness Alert   Comments Cooperative but decreased safety awarness and attention   Passive ROM Upper Body   Passive ROM Upper Body WDL   Active ROM Upper Body   Active ROM Upper Body  WDL   Strength Upper Body   Upper Body Strength  WDL   Coordination Upper Body   Coordination WDL   Balance Assessment   Sitting Balance (Static) Good   Sitting Balance (Dynamic) Fair +   Standing Balance (Static) Fair   Standing Balance (Dynamic) Fair -   Weight Shift Sitting Fair   Weight Shift Standing Poor   Comments w/fww   Bed Mobility    Supine to Sit Supervised   ADL Assessment   Grooming Standby Assist;Standing   Upper Body Dressing Supervision   Lower Body Dressing Standby Assist   Toileting Standby Assist   Comments pt donned prosthetic w/set up   How much help from another person does the patient currently need...   6 Clicks Daily Activity Score 22   Functional Mobility   Sit to Stand Supervised   Bed, Chair, Wheelchair Transfer Standby Assist   Toilet Transfers Standby Assist   Mobility walking in room w/fww   Comments primarily cues for safe fww use and placement   Visual  Perception   Visual Perception  Not Tested   Activity Tolerance   Comments no overt c/o pain or fatigue   Patient / Family Goals   Patient / Family Goal #1 to go home   Short Term Goals   Short Term Goal # 1 pt will complete FB dressing w/spv   Short Term Goal # 2 pt will complete toilet txf w/spv   Short Term Goal # 3 pt will complete grooming standing at sink w/sp v   Education Group   Role of Occupational Therapist Patient Response Patient;Acceptance;Explanation;Demonstration   Occupational Therapy Initial Treatment Plan    Treatment Interventions Self Care / Activities of Daily Living;Manual Therapy Techniques;Therapeutic Exercises;Therapeutic Activity   Treatment Frequency 4 Times per Week   Duration Until Therapy Goals Met   Problem List   Problem List Decreased Active Daily Living Skills;Decreased Functional Mobility;Decreased Activity Tolerance;Impaired Postural Control / Balance;Safety Awareness Deficits / Cognition   Anticipated Discharge Equipment and Recommendations   DC Equipment Recommendations Front-Wheel Walker   Discharge Recommendations Recommend home health for continued occupational therapy services   Interdisciplinary Plan of Care Collaboration   IDT Collaboration with  Nursing;Physical Therapist   Patient Position at End of Therapy Seated;Chair Alarm On;Call Light within Reach;Tray Table within Reach;Phone within Reach   Collaboration Comments RN aware of OT eval and pts efforts   Session Information   Date / Session Number  12/1 #1 (1/4, 12/7)

## 2023-12-01 NOTE — DISCHARGE PLANNING
Choice for [facility] obtained and faxed to [facility].   RN AMY requested KADEN to send referral for HH.   Patient has cane at home and patient states now he has a FWW at home.   Pending PT recommendations.   1429: Patient declined by [facility]  due to insurance. No other home health agencies in his area. Care team notified. Hospitalist, He, to place outpatient PT/OT order for patient. No further needs from case management at this time.      Case Management Discharge Planning    Admission Date: 11/28/2023  GMLOS: 4.1  ALOS: 3    6-Clicks ADL Score: 18  6-Clicks Mobility Score: 17  PT and/or OT Eval ordered: Yes  Post-acute Referrals Ordered: No  Post-acute Choice Obtained: Yes  Has referral(s) been sent to post-acute provider:  Yes      Anticipated Discharge Dispo: Discharge Disposition: Discharged to home/self care (01)  Discharge Address: [address] [place] [address] [place]  Discharge Contact Phone Number: [phone]    DME Needed: No    Action(s) Taken: Patient Conference and Choice obtained    Escalations Completed: PT    Medically Clear: Yes    Next Steps: Pending acceptance with [facility]. Patient has cane and FWW at home.     Barriers to Discharge: Pending PT Evaluation

## 2023-12-02 LAB — 1,25(OH)2D3 SERPL-MCNC: 48.6 PG/ML (ref 19.9–79.3)

## 2023-12-02 NOTE — PROGRESS NOTES
D/c instructions given, educated on worsening s/s. Pt understands and questions answered. D/c with friend

## 2023-12-07 NOTE — DOCUMENTATION QUERY
[facility]                                                                       Query Response Note      PATIENT:               BRIANNE CREWS  ACCT #:                  [id number]  MRN:                     [id number]  :                      1955  ADMIT DATE:       2023 10:33 PM  DISCH DATE:        2023 4:10 PM  RESPONDING  PROVIDER #:        [id number]           QUERY TEXT:    There is conflicting documentation with regards to the diagnosis of right femoral neck fracture.     Right femoral neck fracture secondary to mechanical fall  is documented in the PN from .    Age-related osteoporosis with current pathological fracture of right femur with nonunion is documented in the DC summary from .    Please provide the most accurate diagnosis based upon the clinical indicators and treatment:    The patient's Clinical Indicators include:  Findings:  --Patient admitted for right femoral neck fracture s/p GLF  --Per H&P , ''Right femoral neck fracture, suffered mechanical fall'' is documented  --Per PN , ''femoral neck fracture secondary to mechanical fall'' documented  --Per DC summary, ''Age-related osteopor with curr pathol fx of right femur with nonunion'' documented  --Pelvic xray :  Patient is status post right total hip arthroplasty. No fracture identified. No dislocation.      There is soft tissue gas consistent with recent postop status    Treatment:  --Orthopedic consult  --S/p right total hip arthroplasty of displaced right femoral neck fx  --Pain control  --PT/OT evaluations    Risk Factors:  --S/p GLF  --Osteoporosis    Thank you,  Lilia Gurrola RN, BSN  Clinical   Connect via [employer]  Options provided:   -- Patient had a pathological fracture of the right femoral neck   -- Patient had a fracture of the right femoral neck second to mechanical fall   -- Other  explanation, (please specify other explanation)   -- Unable to determine      Query created by: Lilia Gurrola on 12/4/2023 3:29 PM    RESPONSE TEXT:    Patient had a pathological fracture of the right femoral neck          Electronically signed by:  DELL JUAREZ MD 12/7/2023 2:38 PM

## (undated) DEVICE — TOWELS CLOTH SURGICAL - (4/PK 20PK/CA)

## (undated) DEVICE — SET EXTENSION WITH 2 PORTS (48EA/CA) ***PART #2C8610 IS A SUBSTITUTE*****

## (undated) DEVICE — LENS/HOOD FOR SPACESUIT - (32/PK) PEEL AWAY FACE

## (undated) DEVICE — DRAPE C-ARM LARGE 41IN X 74 IN - (10/BX 2BX/CA)

## (undated) DEVICE — HANDPIECE 10FT INTPLS SCT PLS IRRIGATION HAND CONTROL SET (6/PK)

## (undated) DEVICE — TIP INTPLS HFLO ML ORFC BTRY - (12/CS)  FOR SURGILAV

## (undated) DEVICE — DRAPE IOBAN LARGE 2 INCISE FILM (5EA/CA)

## (undated) DEVICE — DERMABOND ADVANCED - (12EA/BX)

## (undated) DEVICE — DRILL

## (undated) DEVICE — STAPLER SKIN DISP - (6/BX 10BX/CA) VISISTAT

## (undated) DEVICE — PACK TOTAL HIP - (1/CA)

## (undated) DEVICE — SUTURE 3-0 MONOCRYL PLUS PS-1 - 27 INCH (36/BX)

## (undated) DEVICE — CANISTER SUCTION 3000ML MECHANICAL FILTER AUTO SHUTOFF MEDI-VAC NONSTERILE LF DISP  (40EA/CA)

## (undated) DEVICE — GLOVE SZ 7 BIOGEL PI MICRO - PF LF (50PR/BX 4BX/CA)

## (undated) DEVICE — SUTURE GENERAL

## (undated) DEVICE — GLOVE BIOGEL SZ 7 SURGICAL PF LTX - (50PR/BX 4BX/CA)

## (undated) DEVICE — LACTATED RINGERS INJ 1000 ML - (14EA/CA 60CA/PF)

## (undated) DEVICE — SUCTION INSTRUMENT YANKAUER BULBOUS TIP W/O VENT (50EA/CA)

## (undated) DEVICE — TOWEL STOP TIMEOUT SAFETY FLAG (40EA/CA)

## (undated) DEVICE — GLOVE BIOGEL PI ORTHO SZ 7.5 PF LF (40PR/BX)

## (undated) DEVICE — ELECTRODE DUAL RETURN W/ CORD - (50/PK)

## (undated) DEVICE — COVER LIGHT HANDLE ALC PLUS DISP (18EA/BX)

## (undated) DEVICE — GOWN WARMING STANDARD FLEX - (30/CA)

## (undated) DEVICE — GLOVE BIOGEL PI INDICATOR SZ 7.0 SURGICAL PF LF - (50/BX 4BX/CA)

## (undated) DEVICE — SODIUM CHL IRRIGATION 0.9% 1000ML (12EA/CA)

## (undated) DEVICE — DRAPE SURGICAL U 77X120 - (10/CA)

## (undated) DEVICE — SUTURE 2-0 MONOCRYL CT-1

## (undated) DEVICE — Device

## (undated) DEVICE — TUBING CLEARLINK DUO-VENT - C-FLO (48EA/CA)

## (undated) DEVICE — DRAPE LARGE 3 QUARTER - (20/CA)

## (undated) DEVICE — SET LEADWIRE 5 LEAD BEDSIDE DISPOSABLE ECG (1SET OF 5/EA)

## (undated) DEVICE — GLOVE BIOGEL INDICATOR SZ 7.5 SURGICAL PF LTX - (50PR/BX 4BX/CA)

## (undated) DEVICE — SENSOR OXIMETER ADULT SPO2 RD SET (20EA/BX)

## (undated) DEVICE — SLEEVE, VASO, THIGH, MED

## (undated) DEVICE — SODIUM CHL. IRRIGATION 0.9% 3000ML (4EA/CA 65CA/PF)

## (undated) DEVICE — BLADE RECIP 77.5 X 11.2 X .76MM (1/EA)